# Patient Record
Sex: MALE | Race: AMERICAN INDIAN OR ALASKA NATIVE
[De-identification: names, ages, dates, MRNs, and addresses within clinical notes are randomized per-mention and may not be internally consistent; named-entity substitution may affect disease eponyms.]

---

## 2021-12-30 ENCOUNTER — HOSPITAL ENCOUNTER (INPATIENT)
Dept: HOSPITAL 5 - ED | Age: 76
LOS: 16 days | Discharge: HOME HEALTH | DRG: 299 | End: 2022-01-15
Attending: INTERNAL MEDICINE | Admitting: HOSPITALIST
Payer: COMMERCIAL

## 2021-12-30 DIAGNOSIS — I50.9: ICD-10-CM

## 2021-12-30 DIAGNOSIS — J69.0: ICD-10-CM

## 2021-12-30 DIAGNOSIS — J96.01: ICD-10-CM

## 2021-12-30 DIAGNOSIS — Z86.73: ICD-10-CM

## 2021-12-30 DIAGNOSIS — I11.0: ICD-10-CM

## 2021-12-30 DIAGNOSIS — E83.42: ICD-10-CM

## 2021-12-30 DIAGNOSIS — E87.6: ICD-10-CM

## 2021-12-30 DIAGNOSIS — N04.9: ICD-10-CM

## 2021-12-30 DIAGNOSIS — E87.1: ICD-10-CM

## 2021-12-30 DIAGNOSIS — A41.9: ICD-10-CM

## 2021-12-30 DIAGNOSIS — N17.9: ICD-10-CM

## 2021-12-30 DIAGNOSIS — I82.432: Primary | ICD-10-CM

## 2021-12-30 DIAGNOSIS — E87.0: ICD-10-CM

## 2021-12-30 DIAGNOSIS — Z99.3: ICD-10-CM

## 2021-12-30 DIAGNOSIS — I47.2: ICD-10-CM

## 2021-12-30 PROCEDURE — 71045 X-RAY EXAM CHEST 1 VIEW: CPT

## 2021-12-30 PROCEDURE — 81001 URINALYSIS AUTO W/SCOPE: CPT

## 2021-12-30 PROCEDURE — 93306 TTE W/DOPPLER COMPLETE: CPT

## 2021-12-30 PROCEDURE — 36415 COLL VENOUS BLD VENIPUNCTURE: CPT

## 2021-12-30 PROCEDURE — 82565 ASSAY OF CREATININE: CPT

## 2021-12-30 PROCEDURE — 72170 X-RAY EXAM OF PELVIS: CPT

## 2021-12-30 PROCEDURE — P9047 ALBUMIN (HUMAN), 25%, 50ML: HCPCS

## 2021-12-30 PROCEDURE — 83880 ASSAY OF NATRIURETIC PEPTIDE: CPT

## 2021-12-30 PROCEDURE — 85610 PROTHROMBIN TIME: CPT

## 2021-12-30 PROCEDURE — 85014 HEMATOCRIT: CPT

## 2021-12-30 PROCEDURE — 85025 COMPLETE CBC W/AUTO DIFF WBC: CPT

## 2021-12-30 PROCEDURE — 94760 N-INVAS EAR/PLS OXIMETRY 1: CPT

## 2021-12-30 PROCEDURE — 94640 AIRWAY INHALATION TREATMENT: CPT

## 2021-12-30 PROCEDURE — 82088 ASSAY OF ALDOSTERONE: CPT

## 2021-12-30 PROCEDURE — 71275 CT ANGIOGRAPHY CHEST: CPT

## 2021-12-30 PROCEDURE — 87086 URINE CULTURE/COLONY COUNT: CPT

## 2021-12-30 PROCEDURE — 82803 BLOOD GASES ANY COMBINATION: CPT

## 2021-12-30 PROCEDURE — 85018 HEMOGLOBIN: CPT

## 2021-12-30 PROCEDURE — 82533 TOTAL CORTISOL: CPT

## 2021-12-30 PROCEDURE — 85730 THROMBOPLASTIN TIME PARTIAL: CPT

## 2021-12-30 PROCEDURE — 80053 COMPREHEN METABOLIC PANEL: CPT

## 2021-12-30 PROCEDURE — 80048 BASIC METABOLIC PNL TOTAL CA: CPT

## 2021-12-30 PROCEDURE — 82962 GLUCOSE BLOOD TEST: CPT

## 2021-12-30 PROCEDURE — 85520 HEPARIN ASSAY: CPT

## 2021-12-30 PROCEDURE — 85007 BL SMEAR W/DIFF WBC COUNT: CPT

## 2021-12-30 PROCEDURE — 93970 EXTREMITY STUDY: CPT

## 2021-12-30 PROCEDURE — 83735 ASSAY OF MAGNESIUM: CPT

## 2021-12-30 PROCEDURE — 85027 COMPLETE CBC AUTOMATED: CPT

## 2021-12-30 PROCEDURE — 84443 ASSAY THYROID STIM HORMONE: CPT

## 2021-12-30 PROCEDURE — 85049 AUTOMATED PLATELET COUNT: CPT

## 2021-12-31 LAB
ALBUMIN SERPL-MCNC: 2.5 G/DL (ref 3.9–5)
ALT SERPL-CCNC: 17 UNITS/L (ref 7–56)
BASOPHILS # (AUTO): 0 K/MM3 (ref 0–0.1)
BASOPHILS NFR BLD AUTO: 0.1 % (ref 0–1.8)
BILIRUB UR QL STRIP: (no result)
BLOOD UR QL VISUAL: (no result)
BUN SERPL-MCNC: 15 MG/DL (ref 9–20)
BUN/CREAT SERPL: 12 %
CALCIUM SERPL-MCNC: 8.1 MG/DL (ref 8.4–10.2)
EOSINOPHIL # BLD AUTO: 0 K/MM3 (ref 0–0.4)
EOSINOPHIL NFR BLD AUTO: 0 % (ref 0–4.3)
HCT VFR BLD CALC: 35.3 % (ref 35.5–45.6)
HEMOLYSIS INDEX: 5
HGB BLD-MCNC: 11.4 GM/DL (ref 11.8–15.2)
HYALINE CASTS #/AREA URNS LPF: 8 /LPF
LYMPHOCYTES # BLD AUTO: 1.2 K/MM3 (ref 1.2–5.4)
LYMPHOCYTES NFR BLD AUTO: 11.4 % (ref 13.4–35)
MCHC RBC AUTO-ENTMCNC: 32 % (ref 32–34)
MCV RBC AUTO: 84 FL (ref 84–94)
MONOCYTES # (AUTO): 1.4 K/MM3 (ref 0–0.8)
MONOCYTES % (AUTO): 13.3 % (ref 0–7.3)
MUCOUS THREADS #/AREA URNS HPF: (no result) /HPF
PH UR STRIP: 6 [PH] (ref 5–7)
PLATELET # BLD: 264 K/MM3 (ref 140–440)
RBC # BLD AUTO: 4.19 M/MM3 (ref 3.65–5.03)
RBC #/AREA URNS HPF: 3 /HPF (ref 0–6)
UROBILINOGEN UR-MCNC: < 2 MG/DL (ref ?–2)
WBC #/AREA URNS HPF: 9 /HPF (ref 0–6)

## 2021-12-31 RX ADMIN — HEPARIN SODIUM SCH UNIT: 5000 INJECTION, SOLUTION INTRAVENOUS; SUBCUTANEOUS at 22:33

## 2021-12-31 RX ADMIN — POTASSIUM CHLORIDE SCH MLS/HR: 10 INJECTION, SOLUTION INTRAVENOUS at 19:52

## 2021-12-31 RX ADMIN — Medication SCH ML: at 10:28

## 2021-12-31 RX ADMIN — IPRATROPIUM BROMIDE AND ALBUTEROL SULFATE SCH AMPUL: .5; 3 SOLUTION RESPIRATORY (INHALATION) at 14:25

## 2021-12-31 RX ADMIN — IPRATROPIUM BROMIDE AND ALBUTEROL SULFATE SCH: .5; 3 SOLUTION RESPIRATORY (INHALATION) at 13:00

## 2021-12-31 RX ADMIN — POTASSIUM CHLORIDE SCH MLS/HR: 10 INJECTION, SOLUTION INTRAVENOUS at 23:45

## 2021-12-31 RX ADMIN — POTASSIUM CHLORIDE SCH MLS/HR: 10 INJECTION, SOLUTION INTRAVENOUS at 17:24

## 2021-12-31 RX ADMIN — ACETAMINOPHEN PRN MG: 325 TABLET ORAL at 16:09

## 2021-12-31 RX ADMIN — HEPARIN SODIUM SCH UNIT: 5000 INJECTION, SOLUTION INTRAVENOUS; SUBCUTANEOUS at 10:28

## 2021-12-31 RX ADMIN — FAMOTIDINE SCH: 10 TABLET ORAL at 10:28

## 2021-12-31 RX ADMIN — POTASSIUM CHLORIDE SCH MLS/HR: 10 INJECTION, SOLUTION INTRAVENOUS at 22:29

## 2021-12-31 RX ADMIN — IPRATROPIUM BROMIDE AND ALBUTEROL SULFATE SCH: .5; 3 SOLUTION RESPIRATORY (INHALATION) at 23:21

## 2021-12-31 RX ADMIN — FAMOTIDINE SCH MG: 10 TABLET ORAL at 22:34

## 2021-12-31 RX ADMIN — SPIRONOLACTONE SCH MG: 25 TABLET ORAL at 17:36

## 2021-12-31 RX ADMIN — Medication SCH ML: at 22:34

## 2021-12-31 RX ADMIN — POTASSIUM CHLORIDE SCH MLS/HR: 10 INJECTION, SOLUTION INTRAVENOUS at 18:50

## 2021-12-31 RX ADMIN — POTASSIUM CHLORIDE SCH MLS/HR: 10 INJECTION, SOLUTION INTRAVENOUS at 11:43

## 2021-12-31 RX ADMIN — POTASSIUM CHLORIDE SCH: 10 INJECTION, SOLUTION INTRAVENOUS at 19:51

## 2021-12-31 NOTE — XRAY REPORT
PELVIS 1 VIEW(S)



INDICATION / CLINICAL INFORMATION: right hip pain



COMPARISON: None available.

 

FINDINGS:



BONES / JOINT(S): No acute fracture or subluxation. Patient is status post right hip hemiarthroplasty
. There is heterotopic ossification noted in the femoral acetabular joint. No kumar hardware abnormal
ity.



SOFT TISSUES: No significant abnormality.



ADDITIONAL FINDINGS: None.







Signer Name: Román Palomino DO 

Signed: 12/31/2021 12:11 AM

Workstation Name: [id number]

## 2021-12-31 NOTE — CONSULTATION
History of Present Illness





- Reason for Consult


Consult date: 12/31/21


hyponatremia, hypokalemia





- History of Present Illness





76-year-old man with history of hypertension, CVA ,and right hip fracture 

presents with leg swelling.  Patient is a poor historian likely from CVA and not

a great deal was known about the patient's last several days and why he was 

brought to the emergency department.  Patient states that symptoms have been 

ongoing for some time.  Has never seen a nephrologist to his knowledge.  No 

report of any nausea, vomiting, diarrhea, fevers, chills, cough, or shortness of

breath.  





Does have edema, unclear duration however.  








Past History


Past Medical History: hypertension, stroke, other


Past Surgical History: Other (Right hip fracture)





Medications and Allergies


                                    Allergies











Allergy/AdvReac Type Severity Reaction Status Date / Time


 


No Known Allergies Allergy   Verified 12/30/21 22:39











Active Meds: 


Active Medications





Acetaminophen (Acetaminophen 325 Mg Tab)  650 mg PO Q4H PRN


   PRN Reason: Pain MILD(1-3)/Fever >100.5/HA


Albuterol (Albuterol 2.5 Mg/3 Ml Nebu)  2.5 mg IH Q4HRT PRN


   PRN Reason: Shortness Of Breath


Albuterol/Ipratropium (Ipratropium/Albuterol Sulfate 3 Ml Ampul.Neb)  1 ampul IH

Q6HRT SHAY


Famotidine (Famotidine 10 Mg Tab)  10 mg PO BID SHAY


Furosemide (Furosemide 20 Mg/2 Ml Inj)  20 mg IV QDAY SHAY


Heparin Sodium (Porcine) (Heparin 5,000 Unit/1 Ml Vial)  5,000 unit SUB-Q Q12HR 

SHAY


Hydromorphone HCl (Hydromorphone 1 Mg/1 Ml Inj)  0.5 mg IV Q3H PRN


   PRN Reason: Pain , Severe (7-10)


Morphine Sulfate (Morphine 2 Mg/1 Ml Inj)  2 mg IV Q4H PRN


   PRN Reason: Pain, Moderate (4-6)


Ondansetron HCl (Ondansetron 4 Mg/2 Ml Inj)  4 mg IV Q8H PRN


   PRN Reason: Nausea And Vomiting


Sodium Chloride (Sodium Chloride 0.9% 10 Ml Flush Syringe)  10 ml IV BID SHAY


Sodium Chloride (Sodium Chloride 0.9% 10 Ml Flush Syringe)  10 ml IV PRN PRN


   PRN Reason: LINE FLUSH











Review of Systems


All systems: negative (as per HPI)





Exam





- Vital Signs


Vital signs: 


                                   Vital Signs











Temp Pulse Resp BP Pulse Ox


 


 97.8 F   88   20   100/56   96 


 


 12/30/21 22:39  12/30/21 22:39  12/30/21 22:39  12/30/21 22:39  12/30/21 22:39














- Physical Exam


Narrative exam: 





Constitutional: no acute distress


Head: NC/AT


Neck: supple


Lungs: clear to auscultation


CV: RRR, no M/R/G


Abdomen: soft, non-tender, bowel sounds present


Back: nontender


Extremities: 2+ edema, pulses WNL


Skin: intact


Neuro: no focal deficits, alert and oriented x2





Results





- Lab Results





                                 12/30/21 23:51





                                 12/30/21 23:51


                             Most recent lab results











Calcium  8.1 mg/dL (8.4-10.2)  L  12/30/21  23:51    














Assessment and Plan





# Hypokalemia, Hyponatremia: unclear etiology of hypokalemia, but K 2.0 this AM.

 Aggressively replete, will help hyponatremia as well per Adrogue equation.  

Possible to have renal wasting in Fanconi like syndrome given proteinuria, but 

appears that patient may also be having diarrhea and GI loss on repeated 

questioning.


- replete KCl IV 


- no Lasix, start spironolactone for K-sparing property


- if edema not improving with spironolactone, recommend albumin + lasix prn only

to limit K loss


- if BP is stable, consider ACE/ARB which will help K loss as well, BP soft 

currently





# Anasarca, Proteinuria: potential nephrotic range proteinuria, check UP/C to 

quantify.  Cardiology evaluation pending.  Renal function WNL.





# CVA





# HTN: BP soft on admission, monitor

## 2021-12-31 NOTE — XRAY REPORT
CHEST 1 VIEW 12/30/2021 11:56 PM



INDICATION / CLINICAL INFORMATION: cough.



COMPARISON: 1/26/2021



FINDINGS:



SUPPORT DEVICES: None.

HEART / MEDIASTINUM: Stable. 

LUNGS / PLEURA: There is perivascular indistinctness with perihilar opacifications. No pneumothorax. 




ADDITIONAL FINDINGS: No significant additional findings.



IMPRESSION:

1. Mild pulmonary edema.

2. Right aortic arch.



Signer Name: Román Palomino DO 

Signed: 12/31/2021 12:14 AM

Workstation Name: [id number]

## 2021-12-31 NOTE — HISTORY AND PHYSICAL REPORT
History of Present Illness


Date of examination: 12/31/21


Date of admission: 


12/31/21


Chief complaint: 





Leg swelling


Sores on the bottom


History of present illness: 





76-year-old  male with history of hypertension CVA and right hip fracture who is

presenting with leg swelling.  Patient is a poor historian and not a great deal 

was known about the patient's last several days and why he was brought to the 

emergency department.  Paramedics state that someone called for the patient to 

come because he had some sores on his bottom and his legs were more swollen than

normal.  Patient several months ago had a right hip fracture repair and had been

in rehab for quite some time.  He has been at home at least for the last month. 

No report of any nausea vomiting diarrhea fevers chills cough cold congestion or

shortness of breath.  Patient is a poor historian secondary to his history of 

CVA.








In the emergency room patient is found to have potassium of 2.2, sodium 127, 

chloride 86.1, BUN 15 creatinine 1.3, proBNP 1300





Appears though the patient is third spacing.  Does have some mild pulmonary 

edema and lower extremity edema present however the patient clinically when 

reviewing his labs showing signs of being dehydrated.  His sodium and chloride 

levels are both low.  Initially once the sodium was seen start the patient on 

some IV fluids.  Blood pressure is 100 systolic.  Once the chest x-ray resulted 

and the urinalysis was finally done see that the patient does have some protein 

in his urine and likely is patient with nephrotic syndrome causing his third spa

cing.





Past History


Past Medical History: hypertension, stroke, other


Past Surgical History: Other (Right hip fracture)





Medications and Allergies


                                    Allergies











Allergy/AdvReac Type Severity Reaction Status Date / Time


 


No Known Allergies Allergy   Verified 12/30/21 22:39











Active Meds: 


Active Medications





Acetaminophen (Acetaminophen 325 Mg Tab)  650 mg PO Q4H PRN


   PRN Reason: Pain MILD(1-3)/Fever >100.5/HA


Albuterol (Albuterol 2.5 Mg/3 Ml Nebu)  2.5 mg IH Q4HRT PRN


   PRN Reason: Shortness Of Breath


Albuterol/Ipratropium (Ipratropium/Albuterol Sulfate 3 Ml Ampul.Neb)  1 ampul IH

Q6HRT SHAY


Famotidine (Famotidine 20 Mg Tab)  20 mg PO BID SHAY


Furosemide (Furosemide 20 Mg/2 Ml Inj)  20 mg IV QDAY [facility]


Heparin Sodium (Porcine) (Heparin 5,000 Unit/1 Ml Vial)  5,000 unit SUB-Q Q12HR 

SHAY


Hydromorphone HCl (Hydromorphone 1 Mg/1 Ml Inj)  0.5 mg IV Q3H PRN


   PRN Reason: Pain , Severe (7-10)


Morphine Sulfate (Morphine 2 Mg/1 Ml Inj)  2 mg IV Q4H PRN


   PRN Reason: Pain, Moderate (4-6)


Ondansetron HCl (Ondansetron 4 Mg/2 Ml Inj)  4 mg IV Q8H PRN


   PRN Reason: Nausea And Vomiting


Sodium Chloride (Sodium Chloride 0.9% 10 Ml Flush Syringe)  10 ml IV BID SHAY


Sodium Chloride (Sodium Chloride 0.9% 10 Ml Flush Syringe)  10 ml IV PRN PRN


   PRN Reason: LINE FLUSH











Review of Systems


All systems: negative


Constitutional: other (Sores on his bottom, leg swelling)





Exam





- Constitutional


Vitals: 


                                        











Temp Pulse Resp BP Pulse Ox


 


 97.8 F   88   20   100/56   96 


 


 12/30/21 22:39  12/30/21 22:39  12/30/21 22:39  12/30/21 22:39  12/30/21 22:39











General appearance: Present: no acute distress, well-nourished





- EENT


Eyes: Present: PERRL


ENT: hearing intact, clear oral mucosa





- Neck


Neck: Present: supple, normal ROM





- Respiratory


Respiratory effort: normal


Respiratory: bilateral: diminished





- Cardiovascular


Heart Sounds: Present: S1 & S2.  Absent: rub, click





- Extremities


Extremities: pulses symmetrical


Extremity abnormal: edema


Peripheral Pulses: within normal limits





- Abdominal


General gastrointestinal: Present: soft, non-tender, non-distended, normal bowel

sounds


Male genitourinary: Present: normal





- Integumentary


Integumentary: Present: clear, warm, dry





- Musculoskeletal


Musculoskeletal: gait normal, strength equal bilaterally





- Psychiatric


Psychiatric: appropriate mood/affect, intact judgment & insight





- Neurologic


Neurologic: CNII-XII intact, moves all extremities





Results





- Labs


CBC & Chem 7: 


                                 12/30/21 23:51





                                 12/30/21 23:51


Labs: 


                             Laboratory Last Values











WBC  10.6 K/mm3 (4.5-11.0)   12/30/21  23:51    


 


RBC  4.19 M/mm3 (3.65-5.03)   12/30/21  23:51    


 


Hgb  11.4 gm/dl (11.8-15.2)  L  12/30/21  23:51    


 


Hct  35.3 % (35.5-45.6)  L  12/30/21  23:51    


 


MCV  84 fl (84-94)   12/30/21  23:51    


 


MCH  27 pg (28-32)  L  12/30/21  23:51    


 


MCHC  32 % (32-34)   12/30/21  23:51    


 


RDW  19.9 % (13.2-15.2)  H  12/30/21  23:51    


 


Plt Count  264 K/mm3 (140-440)   12/30/21  23:51    


 


Lymph % (Auto)  11.4 % (13.4-35.0)  L  12/30/21  23:51    


 


Mono % (Auto)  13.3 % (0.0-7.3)  H  12/30/21  23:51    


 


Eos % (Auto)  0.0 % (0.0-4.3)   12/30/21  23:51    


 


Baso % (Auto)  0.1 % (0.0-1.8)   12/30/21  23:51    


 


Lymph # (Auto)  1.2 K/mm3 (1.2-5.4)   12/30/21  23:51    


 


Mono # (Auto)  1.4 K/mm3 (0.0-0.8)  H  12/30/21  23:51    


 


Eos # (Auto)  0.0 K/mm3 (0.0-0.4)   12/30/21  23:51    


 


Baso # (Auto)  0.0 K/mm3 (0.0-0.1)   12/30/21  23:51    


 


Seg Neutrophils %  75.2 % (40.0-70.0)  H  12/30/21  23:51    


 


Seg Neutrophils #  8.0 K/mm3 (1.8-7.7)  H  12/30/21  23:51    


 


Sodium  127 mmol/L (137-145)  L  12/30/21  23:51    


 


Potassium  2.0 mmol/L (3.6-5.0)  L*  12/30/21  23:51    


 


Chloride  86.1 mmol/L ()  L  12/30/21  23:51    


 


Carbon Dioxide  25 mmol/L (22-30)   12/30/21  23:51    


 


Anion Gap  18 mmol/L  12/30/21  23:51    


 


BUN  15 mg/dL (9-20)   12/30/21  23:51    


 


Creatinine  1.3 mg/dL (0.8-1.3)   12/30/21  23:51    


 


Estimated GFR  > 60 ml/min  12/30/21  23:51    


 


BUN/Creatinine Ratio  12 %  12/30/21  23:51    


 


Glucose  124 mg/dL ()  H  12/30/21  23:51    


 


Calcium  8.1 mg/dL (8.4-10.2)  L  12/30/21  23:51    


 


Total Bilirubin  1.40 mg/dL (0.1-1.2)  H  12/30/21  23:51    


 


AST  28 units/L (5-40)   12/30/21  23:51    


 


ALT  17 units/L (7-56)   12/30/21  23:51    


 


Alkaline Phosphatase  141 units/L ()  H  12/30/21  23:51    


 


NT-Pro-B Natriuret Pep  1300 pg/mL (0-900)  H  12/30/21  23:51    


 


Total Protein  5.9 g/dL (6.3-8.2)  L  12/30/21  23:51    


 


Albumin  2.5 g/dL (3.9-5)  L  12/30/21  23:51    


 


Albumin/Globulin Ratio  0.7 %  12/30/21  23:51    


 


Urine Color  Maren  (Yellow)   12/31/21  01:00    


 


Urine Turbidity  Slightly-cloudy  (Clear)   12/31/21  01:00    


 


Urine pH  6.0  (5.0-7.0)   12/31/21  01:00    


 


Ur Specific Gravity  1.012  (1.003-1.030)   12/31/21  01:00    


 


Urine Protein  30 mg/dl mg/dL (Negative)   12/31/21  01:00    


 


Urine Glucose (UA)  Neg mg/dL (Negative)   12/31/21  01:00    


 


Urine Ketones  Neg mg/dL (Negative)   12/31/21  01:00    


 


Urine Blood  Neg  (Negative)   12/31/21  01:00    


 


Urine Nitrite  Neg  (Negative)   12/31/21  01:00    


 


Urine Bilirubin  Neg  (Negative)   12/31/21  01:00    


 


Urine Urobilinogen  < 2.0 mg/dL (<2.0)   12/31/21  01:00    


 


Ur Leukocyte Esterase  Neg  (Negative)   12/31/21  01:00    


 


Urine WBC (Auto)  9.0 /HPF (0.0-6.0)  H  12/31/21  01:00    


 


Urine RBC (Auto)  3.0 /HPF (0.0-6.0)   12/31/21  01:00    


 


U Epithel Cells (Auto)  < 1.0 /HPF (0-13.0)   12/31/21  01:00    


 


Hyaline Casts  8 /LPF  12/31/21  01:00    


 


Urine Mucus  Few /HPF  12/31/21  01:00    














- Imaging and Cardiology


Chest x-ray: report reviewed





Assessment and Plan


VTE prophylaxis?: Chemical


Plan of care discussed with patient/family: Yes





- Patient Problems


(1) Anasarca


Current Visit: Yes   Status: Acute   


Plan to address problem: 


Admit the patient to the medical floor.  Cardiac diet.  Oxygen per nasal cannula

3 to per minute.  DuoNeb by nebulizer every 4 hours.  Lasix 40 mg IV daily.  

Echocardiogram.  Cardiology and nephrology evaluation








(2) Hypokalemia


Current Visit: Yes   Status: Acute   


Plan to address problem: 


Potassium is supplemented.  KCl 40 mEq p.o. every 4 hours x2 dose.  Recheck BMP 

in the morning








(3) CVA (cerebral vascular accident)


Current Visit: Yes   Status: Acute   


Plan to address problem: 


Stable.  Continue home medication.  Outpatient follow-up with neurology








(4) Hypertension


Current Visit: Yes   Status: Acute   


Plan to address problem: 


Hydralazine 10 mg IV every 6 hours as needed.  We will continue the home 

medication.  We will monitor the patient closely








(5) Hyponatremia


Current Visit: Yes   Status: Acute   


Plan to address problem: 


We will monitor the sodium closely.  Reconsult nephrology for further 

evaluation.  Recheck BMP in the morning








(6) Nephrotic syndrome


Current Visit: Yes   Status: Acute   


Plan to address problem: 


Clinical picture could be secondary to nephrotic syndrome.  Will consult 

nephrology for evaluation.  Recheck BMP in the morning








(7) DVT prophylaxis


Current Visit: Yes   Status: Acute   


Plan to address problem: 


Heparin 5000 units subcu every 12 hours for DVT prophylaxis.  Pepcid 20 mg p.o. 

twice daily for GI prophylaxis.  Patient is a full code

## 2021-12-31 NOTE — CONSULTATION
DATE OF CONSULTATION: 12/31/2021



HISTORY OF PRESENT ILLNESS:  The patient is a 76-year-old male with multiple 

medical problems including history of a stroke and he is unable to give any 

detailed history.  He was brought in with a chief complaint of ankle edema.  

There is no family member to assist in the history.  He was found to have 

markedly abnormal electrolytes.  He apparently has had a stroke in the past and 

a right hip fracture in the recent past.  He is nonambulatory.  As far as we 

know, there is no history of congestive heart failure, liver disease, kidney 

disease or thromboembolic disease.  There has been no chest pain or coronary 

artery disease as far as we can tell.  There have been no arrhythmias, 

hyperlipidemia, or diabetes.



PAST MEDICAL HISTORY AND ALLERGIES:  None.



MEDICATIONS:  See the nurse's list.



FAMILY HISTORY:  Unobtainable.



SOCIAL HISTORY:  Smoking, unknown.  Alcohol use, unknown.



PAST SURGICAL HISTORY:  Unknown.



REVIEW OF SYSTEMS:  No other complaints or medical problems obtainable.



PHYSICAL EXAMINATION:

GENERAL:  A well-developed, well-nourished, no acute distress.  Alert, but 

confused.

EYES, NOSE, AND THROAT:  Unremarkable.

NECK:  Reveals no JVD or bruits.  Supple, no masses.

LUNGS:  Diminished breath sounds, no rales or rhonchi.  No labored respirations.

HEART:  Regular rhythm.  No rubs, murmurs or gallops appreciable.  Heart sounds 

somewhat distant.

ABDOMEN:  Soft, nontender, no masses.  Bowel sounds intact.

EXTREMITIES:  No cyanosis or clubbing.  There is 2+ lower extremity edema.  

Pulses are intact.  No varicose veins.  Deformity of the right leg.  No obvious 

skin lesions.  There is evidence of vitiligo on his body.



IMPRESSION:

1.  Lower extremity edema:  Etiology is not clear, but there may be an element 

of congestive heart failure given chest x-ray findings and elevated BNP, but the

patient does not show any labored respirations.  It is possible that he has 

right congestive heart failure due to lung disease since the chest x-ray is 

abnormal.  It is unclear if the chest x-ray is demonstrating acute pulmonary 

edema or chronic changes.  Other considerations would be that the edema is due 

to underlying liver disease since the LFTs are significantly abnormal.  

Proteinuria was noted, so nephrotic syndrome would be another possibility.  

Severe venous insufficiency would also be a possibility.  So the workup is in 

progress and echocardiography and renal workup are in progress.

2.  Marked electrolyte abnormalities with hyponatremia and hypokalemia:  

Etiology is in progress and renal workup is in progress.

3.  Elevated LFTs:  Consider underlying cirrhosis or fatty liver disease.

4.  History of cerebrovascular accident.

5.  Hypertension.

6.  Debilitated state with deconditioning following hip surgery.

7.  Vitiligo.



Thank you for this consultation.  We will follow the patient.  We will try to 

obtain previous records as well.







DD: 12/31/2021 10:42 AM

DT: 12/31/2021 12:28 PM

TID: [id number] RECEIPT: [id number]

KATHY/VINNY

## 2021-12-31 NOTE — EMERGENCY DEPARTMENT REPORT
ED General Adult HPI





- General


Chief complaint: Extremity Injury, Lower


Stated complaint: R decubitus


Time Seen by Provider: 12/30/21 23:31


Source: EMS


Mode of arrival: Stretcher


Limitations: No Limitations





- History of Present Illness


Initial comments: 





Patient is a 76-year-old F American male with a past medical history of 

hypertension CVA and right hip fracture who is presenting with leg swelling.  

Patient is a poor historian and not a great deal was known about the patient's 

last several days and why he was brought to the emergency department.  [facility] state that someone called for the patient to come because he had some 

sores on his bottom and his legs were more swollen than normal.  Patient several

months ago had a right hip fracture repair and had been in rehab for quite some 

time.  He has been at home at least for the last month.  No report of any nausea

vomiting diarrhea fevers chills cough cold congestion or shortness of breath.  

Patient is a poor historian secondary to his history of CVA.


Severity scale (0 -10): 0





- Related Data


                                    Allergies











Allergy/AdvReac Type Severity Reaction Status Date / Time


 


No Known Allergies Allergy   Verified 12/30/21 22:39














ED Review of Systems


ROS: 


Stated complaint: R decubitus


Other details as noted in HPI





Comment: Unobtainable due to pts medical conditions





ED Past Medical Hx





- Past Medical History


Hx Hypertension: Yes


Hx CVA: Yes





- Social History


Smoking Status: Unknown if ever smoked





ED Physical Exam





- General


Limitations: No Limitations, Altered Mental Status


General appearance: alert, in no apparent distress





- Head


Head exam: Present: atraumatic, normocephalic





- Eye


Eye exam: Present: normal appearance





- ENT


ENT exam: Present: mucous membranes moist





- Neck


Neck exam: Present: normal inspection





- Respiratory


Respiratory exam: Present: rales, rhonchi.  Absent: normal lung sounds 

bilaterally, respiratory distress, wheezes, stridor





- Cardiovascular


Cardiovascular Exam: Present: regular rate, normal rhythm, normal heart sounds. 

Absent: systolic murmur, diastolic murmur, rubs, gallop





- GI/Abdominal


GI/Abdominal exam: Present: soft, normal bowel sounds.  Absent: distended, 

tenderness, guarding





- Rectal


Rectal exam: Present: deferred





- Extremities Exam


Extremities exam: Present: normal inspection





- Back Exam


Back exam: Present: normal inspection





- Neurological Exam


Neurological exam: Present: alert, oriented X3





- Psychiatric


Psychiatric exam: Present: normal affect, normal mood





- Skin


Skin exam: Present: warm, dry, intact, normal color, other (Stage I or II 

decubitus in the right buttock.  Bilateral lower extremity edema 3+.  Appears to

be some small skin tears near the right ankle.).  Absent: rash





ED Course





                                   Vital Signs











  12/30/21





  22:39


 


Temperature 97.8 F


 


Pulse Rate 88


 


Respiratory 20





Rate 


 


Blood Pressure 100/56





[Right] 


 


O2 Sat by Pulse 96





Oximetry 














ED Medical Decision Making





- Lab Data


Result diagrams: 


                                 12/30/21 23:51





                                 12/30/21 23:51








                                   Lab Results











  12/30/21 12/30/21 12/31/21 Range/Units





  23:51 23:51 01:00 


 


WBC  10.6    (4.5-11.0)  K/mm3


 


RBC  4.19    (3.65-5.03)  M/mm3


 


Hgb  11.4 L    (11.8-15.2)  gm/dl


 


Hct  35.3 L    (35.5-45.6)  %


 


MCV  84    (84-94)  fl


 


MCH  27 L    (28-32)  pg


 


MCHC  32    (32-34)  %


 


RDW  19.9 H    (13.2-15.2)  %


 


Plt Count  264    (140-440)  K/mm3


 


Lymph % (Auto)  11.4 L    (13.4-35.0)  %


 


Mono % (Auto)  13.3 H    (0.0-7.3)  %


 


Eos % (Auto)  0.0    (0.0-4.3)  %


 


Baso % (Auto)  0.1    (0.0-1.8)  %


 


Lymph # (Auto)  1.2    (1.2-5.4)  K/mm3


 


Mono # (Auto)  1.4 H    (0.0-0.8)  K/mm3


 


Eos # (Auto)  0.0    (0.0-0.4)  K/mm3


 


Baso # (Auto)  0.0    (0.0-0.1)  K/mm3


 


Seg Neutrophils %  75.2 H    (40.0-70.0)  %


 


Seg Neutrophils #  8.0 H    (1.8-7.7)  K/mm3


 


Sodium   127 L   (137-145)  mmol/L


 


Potassium   2.0 L*   (3.6-5.0)  mmol/L


 


Chloride   86.1 L   ()  mmol/L


 


Carbon Dioxide   25   (22-30)  mmol/L


 


Anion Gap   18   mmol/L


 


BUN   15   (9-20)  mg/dL


 


Creatinine   1.3   (0.8-1.3)  mg/dL


 


Estimated GFR   > 60   ml/min


 


BUN/Creatinine Ratio   12   %


 


Glucose   124 H   ()  mg/dL


 


Calcium   8.1 L   (8.4-10.2)  mg/dL


 


Total Bilirubin   1.40 H   (0.1-1.2)  mg/dL


 


AST   28   (5-40)  units/L


 


ALT   17   (7-56)  units/L


 


Alkaline Phosphatase   141 H   ()  units/L


 


NT-Pro-B Natriuret Pep   1300 H   (0-900)  pg/mL


 


Total Protein   5.9 L   (6.3-8.2)  g/dL


 


Albumin   2.5 L   (3.9-5)  g/dL


 


Albumin/Globulin Ratio   0.7   %


 


Urine Color    Maren  (Yellow)  


 


Urine Turbidity    Slightly-cloudy  (Clear)  


 


Urine pH    6.0  (5.0-7.0)  


 


Ur Specific Gravity    1.012  (1.003-1.030)  


 


Urine Protein    30 mg/dl  (Negative)  mg/dL


 


Urine Glucose (UA)    Neg  (Negative)  mg/dL


 


Urine Ketones    Neg  (Negative)  mg/dL


 


Urine Blood    Neg  (Negative)  


 


Urine Nitrite    Neg  (Negative)  


 


Urine Bilirubin    Neg  (Negative)  


 


Urine Urobilinogen    < 2.0  (<2.0)  mg/dL


 


Ur Leukocyte Esterase    Neg  (Negative)  


 


Urine WBC (Auto)    9.0 H  (0.0-6.0)  /HPF


 


Urine RBC (Auto)    3.0  (0.0-6.0)  /HPF


 


U Epithel Cells (Auto)    < 1.0  (0-13.0)  /HPF


 


Hyaline Casts    8  /LPF


 


Urine Mucus    Few  /HPF














- Radiology Data





CHEST 1 VIEW 12/30/2021 11:56 PM  


 


 INDICATION / CLINICAL INFORMATION: cough.  


 


 COMPARISON: 1/26/2021  


 


 FINDINGS:  


 


 SUPPORT DEVICES: None.  


 HEART / MEDIASTINUM: Stable.   


 LUNGS / PLEURA: There is perivascular indistinctness with perihilar 

opacifications. No 


pneumothorax.   


 


 ADDITIONAL FINDINGS: No significant additional findings.  


 


 IMPRESSION:  


 1. Mild pulmonary edema.  


 2. Right aortic arch.  


 


 Signer Name: Román Palomino DO   


 Signed: 12/31/2021 12:14 AM  


 Workstation Name: [id number]   


 


 


Transcribed By: NS  


Dictated By: ROMÁN PALOMINO DO  


Electronically Authenticated By: ROMÁN PALOMINO DO    


Signed Date/Time: 12/31/21 0014                                


 


PELVIS 1 VIEW(S)  


 


 INDICATION / CLINICAL INFORMATION: right hip pain  


 


 COMPARISON: None available.  


 


 FINDINGS:  


 


 BONES / JOINT(S): No acute fracture or subluxation. Patient is status post 

right hip 


hemiarthroplasty. There is heterotopic ossification noted in the femoral 

acetabular joint. No kumar 


hardware abnormality.  


 


 SOFT TISSUES: No significant abnormality.  


 


 ADDITIONAL FINDINGS: None.  


 


 


 


 Signer Name: Román Palomino DO   


 Signed: 12/31/2021 12:11 AM  


 Workstation Name: [id number]





- Medical Decision Making





Patient is a 76-year-old F American male who is a poor historian secondary to 

CVA who is presenting with swelling to the legs.  Appears though the patient is 

third spacing.  Does have some mild pulmonary edema and lower extremity edema 

present however the patient clinically when reviewing his labs showing signs of 

being dehydrated.  His sodium and chloride levels are both low.  Initially once 

the sodium was seen start the patient on some IV fluids.  Blood pressure is 100 

systolic.  Once the chest x-ray resulted and the urinalysis was finally done see

 that the patient does have some protein in his urine and likely is patient with

 nephrotic syndrome causing his third spacing.  Patient be admitted to the 

hospital service for further management.  Patient's potassium was 2 and 

potassium supplements have been ordered.


Critical care attestation.: 


If time is entered above; I have spent that time in minutes in the direct care 

of this critically ill patient, excluding procedure time.








ED Disposition


Clinical Impression: 


 Hypokalemia, Hyponatremia, Nephrotic syndrome, Anasarca





Disposition: 09 ADMITTED AS INPATIENT


Is pt being admited?: Yes


Does the pt Need Aspirin: No


Condition: Stable


Time of Disposition: 02:43

## 2022-01-01 LAB
BAND NEUTROPHILS # (MANUAL): 0 K/MM3
BUN SERPL-MCNC: 15 MG/DL (ref 9–20)
BUN/CREAT SERPL: 15 %
CALCIUM SERPL-MCNC: 7.9 MG/DL (ref 8.4–10.2)
HCT VFR BLD CALC: 35.4 % (ref 35.5–45.6)
HEMOLYSIS INDEX: 13
HGB BLD-MCNC: 11.2 GM/DL (ref 11.8–15.2)
MCHC RBC AUTO-ENTMCNC: 32 % (ref 32–34)
MCV RBC AUTO: 88 FL (ref 84–94)
MYELOCYTES # (MANUAL): 0 K/MM3
PLATELET # BLD: 231 K/MM3 (ref 140–440)
PROMYELOCYTES # (MANUAL): 0 K/MM3
RBC # BLD AUTO: 4.05 M/MM3 (ref 3.65–5.03)
TOTAL CELLS COUNTED BLD: 100

## 2022-01-01 RX ADMIN — HEPARIN SODIUM SCH UNIT: 5000 INJECTION, SOLUTION INTRAVENOUS; SUBCUTANEOUS at 10:01

## 2022-01-01 RX ADMIN — HEPARIN SODIUM SCH UNIT: 5000 INJECTION, SOLUTION INTRAVENOUS; SUBCUTANEOUS at 22:12

## 2022-01-01 RX ADMIN — Medication SCH ML: at 22:15

## 2022-01-01 RX ADMIN — IPRATROPIUM BROMIDE AND ALBUTEROL SULFATE SCH AMPUL: .5; 3 SOLUTION RESPIRATORY (INHALATION) at 18:43

## 2022-01-01 RX ADMIN — POTASSIUM CHLORIDE SCH MLS/HR: 10 INJECTION, SOLUTION INTRAVENOUS at 00:45

## 2022-01-01 RX ADMIN — IPRATROPIUM BROMIDE AND ALBUTEROL SULFATE SCH: .5; 3 SOLUTION RESPIRATORY (INHALATION) at 23:16

## 2022-01-01 RX ADMIN — FAMOTIDINE SCH MG: 10 TABLET ORAL at 10:02

## 2022-01-01 RX ADMIN — Medication SCH ML: at 10:02

## 2022-01-01 RX ADMIN — IPRATROPIUM BROMIDE AND ALBUTEROL SULFATE SCH: .5; 3 SOLUTION RESPIRATORY (INHALATION) at 09:59

## 2022-01-01 RX ADMIN — FAMOTIDINE SCH MG: 10 TABLET ORAL at 22:11

## 2022-01-01 RX ADMIN — IPRATROPIUM BROMIDE AND ALBUTEROL SULFATE SCH: .5; 3 SOLUTION RESPIRATORY (INHALATION) at 03:07

## 2022-01-01 RX ADMIN — POTASSIUM CHLORIDE SCH: 10 INJECTION, SOLUTION INTRAVENOUS at 02:01

## 2022-01-01 RX ADMIN — SPIRONOLACTONE SCH MG: 25 TABLET ORAL at 10:02

## 2022-01-01 RX ADMIN — POTASSIUM CHLORIDE SCH MLS/HR: 10 INJECTION, SOLUTION INTRAVENOUS at 02:00

## 2022-01-01 RX ADMIN — POTASSIUM CHLORIDE SCH MEQ: 1500 TABLET, EXTENDED RELEASE ORAL at 22:11

## 2022-01-02 RX ADMIN — IPRATROPIUM BROMIDE AND ALBUTEROL SULFATE SCH: .5; 3 SOLUTION RESPIRATORY (INHALATION) at 10:33

## 2022-01-02 RX ADMIN — HEPARIN SODIUM SCH UNIT: 5000 INJECTION, SOLUTION INTRAVENOUS; SUBCUTANEOUS at 13:09

## 2022-01-02 RX ADMIN — SPIRONOLACTONE SCH MG: 25 TABLET ORAL at 13:09

## 2022-01-02 RX ADMIN — Medication SCH ML: at 22:06

## 2022-01-02 RX ADMIN — HEPARIN SODIUM SCH UNIT: 5000 INJECTION, SOLUTION INTRAVENOUS; SUBCUTANEOUS at 22:05

## 2022-01-02 RX ADMIN — ACETAMINOPHEN PRN MG: 325 TABLET ORAL at 22:07

## 2022-01-02 RX ADMIN — Medication SCH ML: at 13:10

## 2022-01-02 RX ADMIN — IPRATROPIUM BROMIDE AND ALBUTEROL SULFATE SCH AMPUL: .5; 3 SOLUTION RESPIRATORY (INHALATION) at 10:33

## 2022-01-02 RX ADMIN — IPRATROPIUM BROMIDE AND ALBUTEROL SULFATE SCH AMPUL: .5; 3 SOLUTION RESPIRATORY (INHALATION) at 15:11

## 2022-01-02 RX ADMIN — POTASSIUM CHLORIDE SCH MEQ: 1500 TABLET, EXTENDED RELEASE ORAL at 13:09

## 2022-01-02 RX ADMIN — FAMOTIDINE SCH MG: 10 TABLET ORAL at 22:05

## 2022-01-02 RX ADMIN — FAMOTIDINE SCH MG: 10 TABLET ORAL at 13:10

## 2022-01-02 RX ADMIN — IPRATROPIUM BROMIDE AND ALBUTEROL SULFATE SCH AMPUL: .5; 3 SOLUTION RESPIRATORY (INHALATION) at 22:48

## 2022-01-03 LAB
APTT BLD: 67.8 SEC. (ref 24.2–36.6)
BUN SERPL-MCNC: 13 MG/DL (ref 9–20)
BUN/CREAT SERPL: 16 %
CALCIUM SERPL-MCNC: 8.1 MG/DL (ref 8.4–10.2)
HCT VFR BLD CALC: 28.8 % (ref 35.5–45.6)
HEMOLYSIS INDEX: 33
HGB BLD-MCNC: 9 GM/DL (ref 11.8–15.2)
INR PPP: 1.2 (ref 0.87–1.13)
PLATELET # BLD: 247 K/MM3 (ref 140–440)

## 2022-01-03 RX ADMIN — POTASSIUM CHLORIDE SCH MEQ: 1500 TABLET, EXTENDED RELEASE ORAL at 11:05

## 2022-01-03 RX ADMIN — MORPHINE SULFATE PRN MG: 2 INJECTION, SOLUTION INTRAMUSCULAR; INTRAVENOUS at 00:33

## 2022-01-03 RX ADMIN — IPRATROPIUM BROMIDE AND ALBUTEROL SULFATE SCH AMPUL: .5; 3 SOLUTION RESPIRATORY (INHALATION) at 08:29

## 2022-01-03 RX ADMIN — IPRATROPIUM BROMIDE AND ALBUTEROL SULFATE SCH AMPUL: .5; 3 SOLUTION RESPIRATORY (INHALATION) at 13:41

## 2022-01-03 RX ADMIN — Medication SCH ML: at 21:24

## 2022-01-03 RX ADMIN — Medication SCH ML: at 11:06

## 2022-01-03 RX ADMIN — SPIRONOLACTONE SCH MG: 25 TABLET ORAL at 11:05

## 2022-01-03 RX ADMIN — MORPHINE SULFATE PRN MG: 2 INJECTION, SOLUTION INTRAMUSCULAR; INTRAVENOUS at 04:51

## 2022-01-03 RX ADMIN — IPRATROPIUM BROMIDE AND ALBUTEROL SULFATE SCH: .5; 3 SOLUTION RESPIRATORY (INHALATION) at 08:29

## 2022-01-03 RX ADMIN — FAMOTIDINE SCH MG: 10 TABLET ORAL at 21:24

## 2022-01-03 RX ADMIN — HEPARIN SODIUM SCH UNIT: 5000 INJECTION, SOLUTION INTRAVENOUS; SUBCUTANEOUS at 11:05

## 2022-01-03 RX ADMIN — METOPROLOL TARTRATE SCH: 25 TABLET, FILM COATED ORAL at 21:21

## 2022-01-03 RX ADMIN — FAMOTIDINE SCH MG: 10 TABLET ORAL at 11:05

## 2022-01-03 RX ADMIN — IPRATROPIUM BROMIDE AND ALBUTEROL SULFATE SCH AMPUL: .5; 3 SOLUTION RESPIRATORY (INHALATION) at 21:01

## 2022-01-03 RX ADMIN — MORPHINE SULFATE PRN MG: 2 INJECTION, SOLUTION INTRAMUSCULAR; INTRAVENOUS at 18:25

## 2022-01-03 NOTE — VASCULAR LAB REPORT
DUPLEX DOPPLER LOWER EXTREMITY VEINS, BILATERAL



INDICATION / CLINICAL INFORMATION:

EDEMA.



TECHNIQUE:

Duplex doppler imaging was performed through the veins of both lower extremities using venous joey
varun and other maneuvers.



COMPARISON: 

None available.



FINDINGS:

RIGHT COMMON FEMORAL VEIN: Negative.

RIGHT FEMORAL VEIN: Negative.

RIGHT POPLITEAL VEIN: Negative.

RIGHT CALF VEINS: Negative.



LEFT COMMON FEMORAL VEIN: Negative.

LEFT FEMORAL VEIN: Negative.

LEFT POPLITEAL VEIN: Acute thrombus.

LEFT CALF VEINS: Acute thrombus.



ADDITIONAL FINDINGS: Left foreleg subcutaneous soft tissue swelling/edema



IMPRESSION:

1. Acute DVT left popliteal and calf veins.

2. No DVT right lower extremity



Signer Name: Aneudy Wells MD 

Signed: 1/3/2022 7:27 PM

Workstation Name: [id number]

## 2022-01-03 NOTE — EVENT NOTE
Date: 01/03/22





Paged by RN that ultrasound tech states that patient has lower extremity DVT in 

left popliteal vein. Will initiate anticoagulation with heparin gtt.

## 2022-01-04 LAB
APTT BLD: 135 SEC. (ref 24.2–36.6)
BUN SERPL-MCNC: 11 MG/DL (ref 9–20)
BUN/CREAT SERPL: 16 %
CALCIUM SERPL-MCNC: 7.9 MG/DL (ref 8.4–10.2)
HCT VFR BLD CALC: 28.8 % (ref 35.5–45.6)
HEMOLYSIS INDEX: 0
HGB BLD-MCNC: 9.5 GM/DL (ref 11.8–15.2)
INR PPP: 1.13 (ref 0.87–1.13)
MCHC RBC AUTO-ENTMCNC: 33 % (ref 32–34)
MCV RBC AUTO: 85 FL (ref 84–94)
PLATELET # BLD: 244 K/MM3 (ref 140–440)
RBC # BLD AUTO: 3.38 M/MM3 (ref 3.65–5.03)

## 2022-01-04 RX ADMIN — POTASSIUM CHLORIDE SCH MEQ: 1500 TABLET, EXTENDED RELEASE ORAL at 10:38

## 2022-01-04 RX ADMIN — FAMOTIDINE SCH MG: 10 TABLET ORAL at 22:57

## 2022-01-04 RX ADMIN — FAMOTIDINE SCH MG: 10 TABLET ORAL at 10:38

## 2022-01-04 RX ADMIN — METOPROLOL TARTRATE SCH MG: 25 TABLET, FILM COATED ORAL at 10:37

## 2022-01-04 RX ADMIN — SPIRONOLACTONE SCH MG: 25 TABLET ORAL at 10:37

## 2022-01-04 RX ADMIN — IPRATROPIUM BROMIDE AND ALBUTEROL SULFATE SCH AMPUL: .5; 3 SOLUTION RESPIRATORY (INHALATION) at 15:37

## 2022-01-04 RX ADMIN — IPRATROPIUM BROMIDE AND ALBUTEROL SULFATE SCH AMPUL: .5; 3 SOLUTION RESPIRATORY (INHALATION) at 23:25

## 2022-01-04 RX ADMIN — METOPROLOL TARTRATE SCH: 25 TABLET, FILM COATED ORAL at 22:55

## 2022-01-04 RX ADMIN — IPRATROPIUM BROMIDE AND ALBUTEROL SULFATE SCH AMPUL: .5; 3 SOLUTION RESPIRATORY (INHALATION) at 03:44

## 2022-01-04 RX ADMIN — HEPARIN SODIUM SCH MLS/HR: 5000 INJECTION, SOLUTION INTRAVENOUS at 14:32

## 2022-01-04 RX ADMIN — IPRATROPIUM BROMIDE AND ALBUTEROL SULFATE SCH AMPUL: .5; 3 SOLUTION RESPIRATORY (INHALATION) at 09:32

## 2022-01-05 LAB
APTT BLD: 33.1 SEC. (ref 24.2–36.6)
BUN SERPL-MCNC: 9 MG/DL (ref 9–20)
BUN/CREAT SERPL: 15 %
CALCIUM SERPL-MCNC: 8.5 MG/DL (ref 8.4–10.2)
HCT VFR BLD CALC: 29.3 % (ref 35.5–45.6)
HCT VFR BLD CALC: 34.7 % (ref 35.5–45.6)
HEMOLYSIS INDEX: 38
HGB BLD-MCNC: 11 GM/DL (ref 11.8–15.2)
HGB BLD-MCNC: 9.4 GM/DL (ref 11.8–15.2)
INR PPP: 1.02 (ref 0.87–1.13)
MCHC RBC AUTO-ENTMCNC: 32 % (ref 32–34)
MCV RBC AUTO: 87 FL (ref 84–94)
PLATELET # BLD: 261 K/MM3 (ref 140–440)
PLATELET # BLD: 262 K/MM3 (ref 140–440)
RBC # BLD AUTO: 3.99 M/MM3 (ref 3.65–5.03)

## 2022-01-05 RX ADMIN — FAMOTIDINE SCH MG: 10 TABLET ORAL at 21:40

## 2022-01-05 RX ADMIN — IPRATROPIUM BROMIDE AND ALBUTEROL SULFATE SCH AMPUL: .5; 3 SOLUTION RESPIRATORY (INHALATION) at 21:08

## 2022-01-05 RX ADMIN — APIXABAN SCH MG: 5 TABLET, FILM COATED ORAL at 21:39

## 2022-01-05 RX ADMIN — IPRATROPIUM BROMIDE AND ALBUTEROL SULFATE SCH AMPUL: .5; 3 SOLUTION RESPIRATORY (INHALATION) at 09:17

## 2022-01-05 RX ADMIN — IPRATROPIUM BROMIDE AND ALBUTEROL SULFATE SCH: .5; 3 SOLUTION RESPIRATORY (INHALATION) at 21:07

## 2022-01-05 RX ADMIN — SPIRONOLACTONE SCH MG: 25 TABLET ORAL at 10:08

## 2022-01-05 RX ADMIN — POTASSIUM CHLORIDE SCH MEQ: 1500 TABLET, EXTENDED RELEASE ORAL at 10:08

## 2022-01-05 RX ADMIN — METOPROLOL TARTRATE SCH MG: 25 TABLET, FILM COATED ORAL at 10:07

## 2022-01-05 RX ADMIN — Medication SCH ML: at 10:09

## 2022-01-05 RX ADMIN — HEPARIN SODIUM SCH MLS/HR: 5000 INJECTION, SOLUTION INTRAVENOUS at 06:31

## 2022-01-05 RX ADMIN — METOPROLOL TARTRATE SCH MG: 25 TABLET, FILM COATED ORAL at 21:40

## 2022-01-05 RX ADMIN — APIXABAN SCH MG: 5 TABLET, FILM COATED ORAL at 12:30

## 2022-01-05 RX ADMIN — FAMOTIDINE SCH MG: 10 TABLET ORAL at 10:08

## 2022-01-05 RX ADMIN — Medication SCH ML: at 21:41

## 2022-01-06 LAB
HCO3 BLDA-SCNC: 32.2 MMOL/L (ref 20–26)
HCT VFR BLD CALC: 34.6 % (ref 35.5–45.6)
HGB BLD-MCNC: 10.8 GM/DL (ref 11.8–15.2)
MCHC RBC AUTO-ENTMCNC: 31 % (ref 32–34)
MCV RBC AUTO: 89 FL (ref 84–94)
PCO2 BLDA: 49.5 MM HG
PH BLDA: 7.43 PH UNITS (ref 7.35–7.45)
PLATELET # BLD: 239 K/MM3 (ref 140–440)
PO2 BLDA: 25.2 MM HG (ref 80–90)
RBC # BLD AUTO: 3.9 M/MM3 (ref 3.65–5.03)

## 2022-01-06 RX ADMIN — IPRATROPIUM BROMIDE AND ALBUTEROL SULFATE SCH AMPUL: .5; 3 SOLUTION RESPIRATORY (INHALATION) at 13:09

## 2022-01-06 RX ADMIN — IPRATROPIUM BROMIDE AND ALBUTEROL SULFATE SCH AMPUL: .5; 3 SOLUTION RESPIRATORY (INHALATION) at 20:23

## 2022-01-06 RX ADMIN — METOPROLOL TARTRATE SCH MG: 25 TABLET, FILM COATED ORAL at 09:43

## 2022-01-06 RX ADMIN — METOPROLOL TARTRATE SCH MG: 25 TABLET, FILM COATED ORAL at 21:10

## 2022-01-06 RX ADMIN — Medication SCH ML: at 09:42

## 2022-01-06 RX ADMIN — IPRATROPIUM BROMIDE AND ALBUTEROL SULFATE SCH AMPUL: .5; 3 SOLUTION RESPIRATORY (INHALATION) at 08:24

## 2022-01-06 RX ADMIN — Medication SCH ML: at 09:43

## 2022-01-06 RX ADMIN — MORPHINE SULFATE PRN MG: 2 INJECTION, SOLUTION INTRAMUSCULAR; INTRAVENOUS at 09:43

## 2022-01-06 RX ADMIN — Medication SCH: at 09:43

## 2022-01-06 RX ADMIN — APIXABAN SCH MG: 5 TABLET, FILM COATED ORAL at 21:10

## 2022-01-06 RX ADMIN — SPIRONOLACTONE SCH MG: 25 TABLET ORAL at 09:42

## 2022-01-06 RX ADMIN — FAMOTIDINE SCH MG: 10 TABLET ORAL at 21:09

## 2022-01-06 RX ADMIN — Medication SCH ML: at 21:11

## 2022-01-06 RX ADMIN — FAMOTIDINE SCH MG: 10 TABLET ORAL at 09:43

## 2022-01-06 RX ADMIN — POTASSIUM CHLORIDE SCH MEQ: 1500 TABLET, EXTENDED RELEASE ORAL at 09:42

## 2022-01-06 RX ADMIN — APIXABAN SCH MG: 5 TABLET, FILM COATED ORAL at 09:42

## 2022-01-06 NOTE — XRAY REPORT
CHEST 1 VIEW 



INDICATION:  resp failure.



COMPARISON:  12/30/2021



FINDINGS:

Support devices: None. 



Heart: Heart size remains within normal limits. Right aortic arch is again suggested. Multiple surgic
al clips overlie the mediastinum, correlate with history. 

Lungs/Pleura: Prominent interstitial markings, right greater than left, are again seen and not signif
icantly changed. No evidence for acute infiltrate, large pleural effusion or pneumothorax. 



Additional findings: None.



IMPRESSION:

 No acute findings. Chronic findings as described which appear unchanged since 12/30/2021.



Signer Name: Fredi Echeverria Jr, MD 

Signed: 1/6/2022 8:27 AM

Workstation Name: [id number]

## 2022-01-07 LAB
HCT VFR BLD CALC: 34.7 % (ref 35.5–45.6)
HGB BLD-MCNC: 10.7 GM/DL (ref 11.8–15.2)
MCHC RBC AUTO-ENTMCNC: 31 % (ref 32–34)
MCV RBC AUTO: 88 FL (ref 84–94)
PLATELET # BLD: 214 K/MM3 (ref 140–440)
RBC # BLD AUTO: 3.94 M/MM3 (ref 3.65–5.03)

## 2022-01-07 PROCEDURE — 05H833Z INSERTION OF INFUSION DEVICE INTO LEFT AXILLARY VEIN, PERCUTANEOUS APPROACH: ICD-10-PCS | Performed by: HOSPITALIST

## 2022-01-07 RX ADMIN — SPIRONOLACTONE SCH MG: 25 TABLET ORAL at 09:20

## 2022-01-07 RX ADMIN — APIXABAN SCH MG: 5 TABLET, FILM COATED ORAL at 21:55

## 2022-01-07 RX ADMIN — FAMOTIDINE SCH MG: 10 TABLET ORAL at 09:20

## 2022-01-07 RX ADMIN — Medication SCH ML: at 09:20

## 2022-01-07 RX ADMIN — METOPROLOL TARTRATE SCH MG: 25 TABLET, FILM COATED ORAL at 09:20

## 2022-01-07 RX ADMIN — FAMOTIDINE SCH MG: 10 TABLET ORAL at 21:55

## 2022-01-07 RX ADMIN — Medication SCH ML: at 21:54

## 2022-01-07 RX ADMIN — IPRATROPIUM BROMIDE AND ALBUTEROL SULFATE SCH AMPUL: .5; 3 SOLUTION RESPIRATORY (INHALATION) at 21:35

## 2022-01-07 RX ADMIN — METOPROLOL TARTRATE SCH: 25 TABLET, FILM COATED ORAL at 21:53

## 2022-01-07 RX ADMIN — IPRATROPIUM BROMIDE AND ALBUTEROL SULFATE SCH: .5; 3 SOLUTION RESPIRATORY (INHALATION) at 14:20

## 2022-01-07 RX ADMIN — APIXABAN SCH MG: 5 TABLET, FILM COATED ORAL at 09:20

## 2022-01-07 RX ADMIN — IPRATROPIUM BROMIDE AND ALBUTEROL SULFATE SCH: .5; 3 SOLUTION RESPIRATORY (INHALATION) at 09:19

## 2022-01-07 RX ADMIN — MORPHINE SULFATE PRN MG: 2 INJECTION, SOLUTION INTRAMUSCULAR; INTRAVENOUS at 14:59

## 2022-01-07 NOTE — CAT SCAN REPORT
CTA CHEST WITH CONTRAST



INDICATION / CLINICAL INFORMATION: History of DVT with acute respiratory failure.



TECHNIQUE: Axial CT images were obtained through the chest after injection of 100 cc Omnipaque 350 IV
 contrast. 3 plane MIP and/or 3D reconstructions were produced. All CT scans at this location are per
formed using CT dose reduction for ALARA by means of automated exposure control. 



COMPARISON: None available.



FINDINGS:

PULMONARY ARTERIES: Good opacification bilaterally without intraluminal filling defect to suggest acu
te PTE.

THORACIC AORTA: Moderately severe atherosclerotic calcifications and a right aortic arch. There is an
 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 

HEART: Mild cardiomegaly.

CORONARY ARTERY CALCIFICATION: Moderate.

MEDIASTINUM / GENOVEVA: No significant abnormality.

PLEURA: Small right pleural effusion No pneumothorax.

LUNGS: There is dense consolidation in the posterior segment of the right upper lobe and throughout t
he right lower lobe. 



ADDITIONAL FINDINGS: There is mild bilateral gynecomastia.



UPPER ABDOMEN: There is a large mixed cystic and solid mass in the right upper quadrant possibly invo
lving the liver, pancreatic head and duodenum. There are bilateral renal cysts.



SKELETAL STRUCTURES: No significant osseous abnormality.



IMPRESSION:

1. No CT evidence for pulmonary embolism. 

2. Right upper and lower lobe pneumonia. Differential diagnosis includes bacterial pneumonia and aspi
ration pneumonia.

3. Large mixed cystic and solid mass in the right upper quadrant is new since the prior study.



Signer Name: Aries Herron MD 

Signed: 1/7/2022 9:44 PM

Workstation Name: [id number]

## 2022-01-08 LAB
HCT VFR BLD CALC: 30.3 % (ref 35.5–45.6)
HGB BLD-MCNC: 9.4 GM/DL (ref 11.8–15.2)
MCHC RBC AUTO-ENTMCNC: 31 % (ref 32–34)
MCV RBC AUTO: 89 FL (ref 84–94)
PLATELET # BLD: 206 K/MM3 (ref 140–440)
RBC # BLD AUTO: 3.4 M/MM3 (ref 3.65–5.03)

## 2022-01-08 RX ADMIN — IPRATROPIUM BROMIDE AND ALBUTEROL SULFATE SCH AMPUL: .5; 3 SOLUTION RESPIRATORY (INHALATION) at 08:39

## 2022-01-08 RX ADMIN — SPIRONOLACTONE SCH MG: 25 TABLET ORAL at 10:40

## 2022-01-08 RX ADMIN — METOPROLOL TARTRATE SCH MG: 25 TABLET, FILM COATED ORAL at 10:39

## 2022-01-08 RX ADMIN — Medication SCH ML: at 21:36

## 2022-01-08 RX ADMIN — PIPERACILLIN AND TAZOBACTAM SCH MLS/HR: 4; .5 INJECTION, POWDER, FOR SOLUTION INTRAVENOUS at 17:39

## 2022-01-08 RX ADMIN — PIPERACILLIN AND TAZOBACTAM SCH MLS/HR: 4; .5 INJECTION, POWDER, FOR SOLUTION INTRAVENOUS at 10:40

## 2022-01-08 RX ADMIN — APIXABAN SCH MG: 5 TABLET, FILM COATED ORAL at 21:35

## 2022-01-08 RX ADMIN — Medication SCH ML: at 10:40

## 2022-01-08 RX ADMIN — APIXABAN SCH MG: 5 TABLET, FILM COATED ORAL at 10:39

## 2022-01-08 RX ADMIN — METOPROLOL TARTRATE SCH MG: 25 TABLET, FILM COATED ORAL at 21:36

## 2022-01-08 RX ADMIN — FAMOTIDINE SCH MG: 10 TABLET ORAL at 10:39

## 2022-01-08 RX ADMIN — FAMOTIDINE SCH MG: 10 TABLET ORAL at 21:36

## 2022-01-08 RX ADMIN — IPRATROPIUM BROMIDE AND ALBUTEROL SULFATE SCH AMPUL: .5; 3 SOLUTION RESPIRATORY (INHALATION) at 15:09

## 2022-01-08 NOTE — CONSULTATION
History of Present Illness


Reason for consult: pneumonia


History of present illness: 





This is a gentleman withhx of cva, hip fx who was admitted with le swelling. Pt 

was found to have lt le dvt. He had a cta done to evaluate for acute pulmonary 

emboli and this negative but showed bilobar pneumonia on the rt.


He reports cough which is productive. He denies any nausea or vomiting. He 

denies any hx of aspiration.





Past History


Past Medical History: hypertension, stroke, other


Past Surgical History: Other (Right hip fracture)





Medications and Allergies


                                    Allergies











Allergy/AdvReac Type Severity Reaction Status Date / Time


 


No Known Allergies Allergy   Verified 12/30/21 22:39











Active Meds: 


Active Medications





Acetaminophen (Acetaminophen 325 Mg Tab)  650 mg PO Q4H PRN


   PRN Reason: Pain MILD(1-3)/Fever >100.5/HA


   Last Admin: 01/02/22 22:07 Dose:  650 mg


   


Albuterol (Albuterol 2.5 Mg/3 Ml Nebu)  2.5 mg IH Q4HRT PRN


   PRN Reason: Shortness Of Breath


Albuterol/Ipratropium (Ipratropium/Albuterol Sulfate 3 Ml Ampul.Neb)  1 ampul IH

TIDRT Replaced by [facility]


   Last Admin: 01/08/22 08:39 Dose:  1 ampul


   


Apixaban (Apixaban 5 Mg Tab)  10 mg PO Q12HR SHAY; Protocol


   Stop: 01/11/22 22:01


   Last Admin: 01/07/22 21:55 Dose:  10 mg


   


Apixaban (Apixaban 5 Mg Tab)  5 mg PO Q12HR Replaced by [facility]; Protocol


Famotidine (Famotidine 10 Mg Tab)  10 mg PO BID Replaced by [facility]


   Last Admin: 01/07/22 21:55 Dose:  10 mg


   


Hydromorphone HCl (Hydromorphone 1 Mg/1 Ml Inj)  0.5 mg IV Q3H PRN


   PRN Reason: Pain , Severe (7-10)


Piperacillin Sod/Tazobactam Sod (Zosyn/Ns 3.375gm/50ml)  3.375 gm in 50 mls @ 

100 mls/hr IV Q8H Replaced by [facility]; Protocol


Metoprolol Tartrate (Metoprolol Tartrate 25 Mg Tab)  12.5 mg PO BID Replaced by [facility]


   Last Admin: 01/07/22 21:53 Dose:  Not Given


   


Morphine Sulfate (Morphine 2 Mg/1 Ml Inj)  2 mg IV Q4H PRN


   PRN Reason: Pain, Moderate (4-6)


   Last Admin: 01/07/22 14:59 Dose:  2 mg


   


Ondansetron HCl (Ondansetron 4 Mg/2 Ml Inj)  4 mg IV Q8H PRN


   PRN Reason: Nausea And Vomiting


Sodium Chloride (Sodium Chloride 0.9% 10 Ml Flush Syringe)  10 ml IV BID Replaced by [facility]


   Last Admin: 01/07/22 21:54 Dose:  10 ml


   


Sodium Chloride (Sodium Chloride 0.9% 10 Ml Flush Syringe)  10 ml IV PRN PRN


   PRN Reason: LINE FLUSH


Spironolactone (Spironolactone 25 Mg Tab)  25 mg PO QDAY Replaced by [facility]


   Last Admin: 01/07/22 09:20 Dose:  25 mg


   











Review of Systems


Constitutional: fatigue


Respiratory: cough, cough with sputum, congestion





Physical Examination


Vital signs: 


                                   Vital Signs











Temp Pulse Resp BP Pulse Ox


 


 97.8 F   88   20   100/56   96 


 


 12/30/21 22:39  12/30/21 22:39  12/30/21 22:39  12/30/21 22:39  12/30/21 22:39











General appearance: alert


ENT: oropharynx moist


Neck: supple


Ascultation: Bilateral: rhonchi


Cardiovascular: regular rate and rhythm


Gastrointestinal: normoactive bowel sounds, non-distended


Integumentary: normal


Extremities: no cyanosis


Musculoskeletal: no deformities


mood appropriate





Results





- Laboratory Findings


CBC and BMP: 


                                 01/07/22 10:07





                                 01/08/22 08:28


ABG











ABG pH  7.431 pH Units (7.350-7.450)   01/06/22  12:30    


 


ABG pCO2  49.5 mm Hg  01/06/22  12:30    


 


ABG pO2  25.2 mm Hg (80.0-90.0)  L*  01/06/22  12:30    


 


ABG O2 Saturation  41.1 % (95.0-99.0)  L  01/06/22  12:30    





PT/INR, D-dimer











PT  14.5 Sec. (12.2-14.9)   01/05/22  14:48    


 


INR  1.02  (0.87-1.13)   01/05/22  14:48    








Abnormal lab findings: 


                                  Abnormal Labs











  12/30/21 12/30/21 12/31/21





  23:51 23:51 01:00


 


WBC   


 


RBC   


 


Hgb  11.4 L  


 


Hct  35.3 L  


 


MCH  27 L  


 


MCHC   


 


RDW  19.9 H  


 


Lymph % (Auto)  11.4 L  


 


Mono % (Auto)  13.3 H  


 


Mono # (Auto)  1.4 H  


 


Seg Neutrophils %  75.2 H  


 


Seg Neuts % (Manual)   


 


Seg Neutrophils #  8.0 H  


 


PT   


 


INR   


 


APTT   


 


Heparin Anti-Xa Level   


 


ABG pO2   


 


ABG HCO3   


 


ABG O2 Saturation   


 


ABG Base Excess   


 


ABG Hemoglobin   


 


Oxyhemoglobin   


 


Sodium   127 L 


 


Potassium   2.0 L* 


 


Chloride   86.1 L 


 


Carbon Dioxide   


 


Creatinine   


 


Glucose   124 H 


 


Calcium   8.1 L 


 


Total Bilirubin   1.40 H 


 


Alkaline Phosphatase   141 H 


 


NT-Pro-B Natriuret Pep   1300 H 


 


Total Protein   5.9 L 


 


Albumin   2.5 L 


 


Urine WBC (Auto)    9.0 H














  01/01/22 01/01/22 01/03/22





  11:15 11:15 00:06


 


WBC   


 


RBC   


 


Hgb  11.2 L  


 


Hct  35.4 L  


 


MCH   


 


MCHC   


 


RDW  20.6 H  


 


Lymph % (Auto)   


 


Mono % (Auto)   


 


Mono # (Auto)   


 


Seg Neutrophils %   


 


Seg Neuts % (Manual)  72.0 H  


 


Seg Neutrophils #   


 


PT   


 


INR   


 


APTT   


 


Heparin Anti-Xa Level   


 


ABG pO2   


 


ABG HCO3   


 


ABG O2 Saturation   


 


ABG Base Excess   


 


ABG Hemoglobin   


 


Oxyhemoglobin   


 


Sodium   129 L  132 L


 


Potassium   3.3 L D  3.4 L


 


Chloride   93.5 L  95.1 L


 


Carbon Dioxide    21 L


 


Creatinine   


 


Glucose    109 H


 


Calcium   7.9 L  8.1 L


 


Total Bilirubin   


 


Alkaline Phosphatase   


 


NT-Pro-B Natriuret Pep   


 


Total Protein   


 


Albumin   


 


Urine WBC (Auto)   














  01/03/22 01/03/22 01/04/22





  20:38 20:38 00:41


 


WBC   


 


RBC   


 


Hgb  9.0 L  


 


Hct  28.8 L D  


 


MCH   


 


MCHC   


 


RDW   


 


Lymph % (Auto)   


 


Mono % (Auto)   


 


Mono # (Auto)   


 


Seg Neutrophils %   


 


Seg Neuts % (Manual)   


 


Seg Neutrophils #   


 


PT   16.5 H 


 


INR   1.20 H 


 


APTT   67.8 H* 


 


Heparin Anti-Xa Level    0.21 L


 


ABG pO2   


 


ABG HCO3   


 


ABG O2 Saturation   


 


ABG Base Excess   


 


ABG Hemoglobin   


 


Oxyhemoglobin   


 


Sodium   


 


Potassium   


 


Chloride   


 


Carbon Dioxide   


 


Creatinine   


 


Glucose   


 


Calcium   


 


Total Bilirubin   


 


Alkaline Phosphatase   


 


NT-Pro-B Natriuret Pep   


 


Total Protein   


 


Albumin   


 


Urine WBC (Auto)   














  01/04/22 01/04/22 01/04/22





  04:48 10:32 13:35


 


WBC   


 


RBC    3.38 L


 


Hgb    9.5 L


 


Hct    28.8 L


 


MCH   


 


MCHC   


 


RDW    20.8 H


 


Lymph % (Auto)   


 


Mono % (Auto)   


 


Mono # (Auto)   


 


Seg Neutrophils %   


 


Seg Neuts % (Manual)   


 


Seg Neutrophils #   


 


PT   15.7 H 


 


INR   


 


APTT   135.0 H* 


 


Heparin Anti-Xa Level   


 


ABG pO2   


 


ABG HCO3   


 


ABG O2 Saturation   


 


ABG Base Excess   


 


ABG Hemoglobin   


 


Oxyhemoglobin   


 


Sodium   


 


Potassium  3.4 L  


 


Chloride   


 


Carbon Dioxide   


 


Creatinine  0.7 L  


 


Glucose   


 


Calcium  7.9 L  


 


Total Bilirubin   


 


Alkaline Phosphatase   


 


NT-Pro-B Natriuret Pep   


 


Total Protein   


 


Albumin   


 


Urine WBC (Auto)   














  01/04/22 01/04/22 01/05/22





  13:35 13:49 02:23


 


WBC   


 


RBC   


 


Hgb    9.4 L


 


Hct    29.3 L


 


MCH   


 


MCHC   


 


RDW   


 


Lymph % (Auto)   


 


Mono % (Auto)   


 


Mono # (Auto)   


 


Seg Neutrophils %   


 


Seg Neuts % (Manual)   


 


Seg Neutrophils #   


 


PT   


 


INR   


 


APTT   171.6 H* 


 


Heparin Anti-Xa Level   


 


ABG pO2   


 


ABG HCO3   


 


ABG O2 Saturation   


 


ABG Base Excess   


 


ABG Hemoglobin   


 


Oxyhemoglobin   


 


Sodium   


 


Potassium   


 


Chloride   


 


Carbon Dioxide   


 


Creatinine  0.6 L  


 


Glucose   


 


Calcium   


 


Total Bilirubin   


 


Alkaline Phosphatase   


 


NT-Pro-B Natriuret Pep   


 


Total Protein   


 


Albumin   


 


Urine WBC (Auto)   














  01/05/22 01/05/22 01/05/22





  02:23 14:48 14:48


 


WBC   


 


RBC   


 


Hgb   


 


Hct   


 


MCH   


 


MCHC   


 


RDW   


 


Lymph % (Auto)   


 


Mono % (Auto)   


 


Mono # (Auto)   


 


Seg Neutrophils %   


 


Seg Neuts % (Manual)   


 


Seg Neutrophils #   


 


PT   


 


INR   


 


APTT   


 


Heparin Anti-Xa Level  < 0.10 L  < 0.10 L 


 


ABG pO2   


 


ABG HCO3   


 


ABG O2 Saturation   


 


ABG Base Excess   


 


ABG Hemoglobin   


 


Oxyhemoglobin   


 


Sodium    136 L


 


Potassium   


 


Chloride    97.2 L


 


Carbon Dioxide   


 


Creatinine    0.6 L


 


Glucose   


 


Calcium   


 


Total Bilirubin   


 


Alkaline Phosphatase   


 


NT-Pro-B Natriuret Pep   


 


Total Protein   


 


Albumin   


 


Urine WBC (Auto)   














  01/05/22 01/05/22 01/06/22





  14:48 Unknown 12:30


 


WBC   


 


RBC   


 


Hgb   11.0 L 


 


Hct   34.7 L 


 


MCH   


 


MCHC   


 


RDW   21.0 H 


 


Lymph % (Auto)   


 


Mono % (Auto)   


 


Mono # (Auto)   


 


Seg Neutrophils %   


 


Seg Neuts % (Manual)   


 


Seg Neutrophils #   


 


PT   


 


INR   


 


APTT   


 


Heparin Anti-Xa Level   


 


ABG pO2    25.2 L*


 


ABG HCO3    32.2 H


 


ABG O2 Saturation    41.1 L


 


ABG Base Excess    6.6 H


 


ABG Hemoglobin    10.2 L


 


Oxyhemoglobin    40.1 L


 


Sodium   


 


Potassium   


 


Chloride   


 


Carbon Dioxide   


 


Creatinine  0.6 L  


 


Glucose   


 


Calcium   


 


Total Bilirubin   


 


Alkaline Phosphatase   


 


NT-Pro-B Natriuret Pep   


 


Total Protein   


 


Albumin   


 


Urine WBC (Auto)   














  01/06/22 01/07/22 01/07/22





  13:22 10:07 10:07


 


WBC  19.9 H  16.7 H 


 


RBC   


 


Hgb  10.8 L  10.7 L 


 


Hct  34.6 L  34.7 L 


 


MCH   27 L 


 


MCHC  31 L  31 L 


 


RDW  21.1 H  20.8 H 


 


Lymph % (Auto)   


 


Mono % (Auto)   


 


Mono # (Auto)   


 


Seg Neutrophils %   


 


Seg Neuts % (Manual)   


 


Seg Neutrophils #   


 


PT   


 


INR   


 


APTT   


 


Heparin Anti-Xa Level   


 


ABG pO2   


 


ABG HCO3   


 


ABG O2 Saturation   


 


ABG Base Excess   


 


ABG Hemoglobin   


 


Oxyhemoglobin   


 


Sodium   


 


Potassium   


 


Chloride   


 


Carbon Dioxide   


 


Creatinine    0.6 L


 


Glucose   


 


Calcium   


 


Total Bilirubin   


 


Alkaline Phosphatase   


 


NT-Pro-B Natriuret Pep   


 


Total Protein   


 


Albumin   


 


Urine WBC (Auto)   














- Diagnostic Findings


Chest x-ray: report reviewed, image reviewed


CT scan - chest: report reviewed, image reviewed





Assessment and Plan





- Patient Problems


(1) Pneumonia


Current Visit: Yes   Status: Acute   





(2) CVA (cerebral vascular accident)


Current Visit: Yes   Status: Acute   





(3) Edema


Current Visit: Yes   Status: Acute   





(4) Hypokalemia


Current Visit: Yes   Status: Acute   





(5) Nonsustained ventricular tachycardia


Current Visit: Yes   Status: Acute

## 2022-01-09 LAB
ALBUMIN SERPL-MCNC: 2.5 G/DL (ref 3.9–5)
ALT SERPL-CCNC: 15 UNITS/L (ref 7–56)
BUN SERPL-MCNC: 13 MG/DL (ref 9–20)
BUN/CREAT SERPL: 16 %
CALCIUM SERPL-MCNC: 8.6 MG/DL (ref 8.4–10.2)
HCT VFR BLD CALC: 31.7 % (ref 35.5–45.6)
HEMOLYSIS INDEX: 33
HGB BLD-MCNC: 10 GM/DL (ref 11.8–15.2)
MCHC RBC AUTO-ENTMCNC: 32 % (ref 32–34)
MCV RBC AUTO: 89 FL (ref 84–94)
PLATELET # BLD: 222 K/MM3 (ref 140–440)
RBC # BLD AUTO: 3.56 M/MM3 (ref 3.65–5.03)

## 2022-01-09 RX ADMIN — METOPROLOL TARTRATE SCH MG: 25 TABLET, FILM COATED ORAL at 22:06

## 2022-01-09 RX ADMIN — METOPROLOL TARTRATE SCH MG: 25 TABLET, FILM COATED ORAL at 09:50

## 2022-01-09 RX ADMIN — APIXABAN SCH MG: 5 TABLET, FILM COATED ORAL at 22:06

## 2022-01-09 RX ADMIN — Medication SCH ML: at 22:07

## 2022-01-09 RX ADMIN — PIPERACILLIN AND TAZOBACTAM SCH MLS/HR: 4; .5 INJECTION, POWDER, FOR SOLUTION INTRAVENOUS at 09:49

## 2022-01-09 RX ADMIN — IPRATROPIUM BROMIDE AND ALBUTEROL SULFATE SCH AMPUL: .5; 3 SOLUTION RESPIRATORY (INHALATION) at 20:53

## 2022-01-09 RX ADMIN — SPIRONOLACTONE SCH MG: 25 TABLET ORAL at 09:49

## 2022-01-09 RX ADMIN — PIPERACILLIN AND TAZOBACTAM SCH MLS/HR: 4; .5 INJECTION, POWDER, FOR SOLUTION INTRAVENOUS at 02:33

## 2022-01-09 RX ADMIN — IPRATROPIUM BROMIDE AND ALBUTEROL SULFATE SCH: .5; 3 SOLUTION RESPIRATORY (INHALATION) at 20:52

## 2022-01-09 RX ADMIN — FAMOTIDINE SCH MG: 10 TABLET ORAL at 09:49

## 2022-01-09 RX ADMIN — Medication SCH ML: at 09:50

## 2022-01-09 RX ADMIN — DEXTROSE AND SODIUM CHLORIDE SCH MLS/HR: 5; .9 INJECTION, SOLUTION INTRAVENOUS at 21:45

## 2022-01-09 RX ADMIN — APIXABAN SCH MG: 5 TABLET, FILM COATED ORAL at 09:50

## 2022-01-09 RX ADMIN — FAMOTIDINE SCH MG: 10 TABLET ORAL at 22:07

## 2022-01-09 RX ADMIN — PIPERACILLIN AND TAZOBACTAM SCH MLS/HR: 4; .5 INJECTION, POWDER, FOR SOLUTION INTRAVENOUS at 18:40

## 2022-01-09 RX ADMIN — IPRATROPIUM BROMIDE AND ALBUTEROL SULFATE SCH: .5; 3 SOLUTION RESPIRATORY (INHALATION) at 20:53

## 2022-01-09 NOTE — EVENT NOTE
renal function has normalized patient is doing well from renal standpoint will 

sign off please call if needed at [phone]

## 2022-01-10 LAB
BASOPHILS # (AUTO): 0 K/MM3 (ref 0–0.1)
BASOPHILS NFR BLD AUTO: 0.4 % (ref 0–1.8)
BUN SERPL-MCNC: 10 MG/DL (ref 9–20)
BUN/CREAT SERPL: 14 %
CALCIUM SERPL-MCNC: 8.5 MG/DL (ref 8.4–10.2)
EOSINOPHIL # BLD AUTO: 0 K/MM3 (ref 0–0.4)
EOSINOPHIL NFR BLD AUTO: 0.3 % (ref 0–4.3)
HCT VFR BLD CALC: 27.4 % (ref 35.5–45.6)
HEMOLYSIS INDEX: 88
HGB BLD-MCNC: 8.6 GM/DL (ref 11.8–15.2)
LYMPHOCYTES # BLD AUTO: 1.7 K/MM3 (ref 1.2–5.4)
LYMPHOCYTES NFR BLD AUTO: 20 % (ref 13.4–35)
MCHC RBC AUTO-ENTMCNC: 31 % (ref 32–34)
MCV RBC AUTO: 86 FL (ref 84–94)
MONOCYTES # (AUTO): 1 K/MM3 (ref 0–0.8)
MONOCYTES % (AUTO): 12.3 % (ref 0–7.3)
PLATELET # BLD: 241 K/MM3 (ref 140–440)
RBC # BLD AUTO: 3.17 M/MM3 (ref 3.65–5.03)

## 2022-01-10 RX ADMIN — FAMOTIDINE SCH MG: 10 TABLET ORAL at 09:33

## 2022-01-10 RX ADMIN — FAMOTIDINE SCH MG: 10 TABLET ORAL at 22:23

## 2022-01-10 RX ADMIN — Medication SCH ML: at 09:34

## 2022-01-10 RX ADMIN — METOPROLOL TARTRATE SCH MG: 25 TABLET, FILM COATED ORAL at 09:34

## 2022-01-10 RX ADMIN — DEXTROSE AND SODIUM CHLORIDE SCH MLS/HR: 5; .9 INJECTION, SOLUTION INTRAVENOUS at 18:19

## 2022-01-10 RX ADMIN — PIPERACILLIN AND TAZOBACTAM SCH MLS/HR: 4; .5 INJECTION, POWDER, FOR SOLUTION INTRAVENOUS at 18:18

## 2022-01-10 RX ADMIN — PIPERACILLIN AND TAZOBACTAM SCH MLS/HR: 4; .5 INJECTION, POWDER, FOR SOLUTION INTRAVENOUS at 02:00

## 2022-01-10 RX ADMIN — IPRATROPIUM BROMIDE AND ALBUTEROL SULFATE SCH AMPUL: .5; 3 SOLUTION RESPIRATORY (INHALATION) at 21:57

## 2022-01-10 RX ADMIN — Medication SCH ML: at 22:24

## 2022-01-10 RX ADMIN — IPRATROPIUM BROMIDE AND ALBUTEROL SULFATE SCH AMPUL: .5; 3 SOLUTION RESPIRATORY (INHALATION) at 15:27

## 2022-01-10 RX ADMIN — IPRATROPIUM BROMIDE AND ALBUTEROL SULFATE SCH AMPUL: .5; 3 SOLUTION RESPIRATORY (INHALATION) at 09:46

## 2022-01-10 RX ADMIN — APIXABAN SCH MG: 5 TABLET, FILM COATED ORAL at 22:23

## 2022-01-10 RX ADMIN — SPIRONOLACTONE SCH MG: 25 TABLET ORAL at 09:34

## 2022-01-10 RX ADMIN — APIXABAN SCH MG: 5 TABLET, FILM COATED ORAL at 09:34

## 2022-01-10 RX ADMIN — PIPERACILLIN AND TAZOBACTAM SCH MLS/HR: 4; .5 INJECTION, POWDER, FOR SOLUTION INTRAVENOUS at 09:39

## 2022-01-10 RX ADMIN — METOPROLOL TARTRATE SCH MG: 25 TABLET, FILM COATED ORAL at 22:23

## 2022-01-10 NOTE — CONSULTATION
History of Present Illness





- Reason for Consult


Consult date: 01/10/22


pneumonia


Requesting physician: JENNIFER BAUTISTA





- History of Present Illness





The patient is a 76-year-old male with hypertension, CVA, history of right hip 

fracture was admitted with leg swelling, anasarca.  Concern was for possible 

nephrotic syndrome, was seen by nephrology.  Echocardiogram showed EF of 55 to 

65% with mild diastolic dysfunction.  Patient also found to have a DVT in the 

lower extremity, started on anticoagulation.  Patient developed worsening 

hypoxia, CTA revealed no PE but showed right upper lobe and middle lobe 

pneumonia concerning for aspiration.  Started on Zosyn and infectious diseases 

was consulted for additional evaluation.  He has been afebrile.  He is drowsy, 

is a poor historian.





Review of Systems: 


Poor historian with limited ROS and history





Past History


Past Medical History: hypertension, stroke, other


Past Surgical History: Other (Right hip fracture)





Medications and Allergies


                                    Allergies











Allergy/AdvReac Type Severity Reaction Status Date / Time


 


No Known Allergies Allergy   Verified 12/30/21 22:39











Active Meds: 


Active Medications





Acetaminophen (Acetaminophen 325 Mg Tab)  650 mg PO Q4H PRN


   PRN Reason: Pain MILD(1-3)/Fever >100.5/HA


   Last Admin: 01/02/22 22:07 Dose:  650 mg


   


Albuterol (Albuterol 2.5 Mg/3 Ml Nebu)  2.5 mg IH Q4HRT PRN


   PRN Reason: Shortness Of Breath


Albuterol/Ipratropium (Ipratropium/Albuterol Sulfate 3 Ml Ampul.Neb)  1 ampul IH

TIDRT SHAY


   Last Admin: 01/10/22 09:46 Dose:  1 ampul


   


Apixaban (Apixaban 5 Mg Tab)  10 mg PO Q12HR SHAY; Protocol


   Stop: 01/11/22 22:01


   Last Admin: 01/10/22 09:34 Dose:  10 mg


   


Apixaban (Apixaban 5 Mg Tab)  5 mg PO Q12HR SHAY; Protocol


Famotidine (Famotidine 10 Mg Tab)  10 mg PO BID SHAY


   Last Admin: 01/10/22 09:33 Dose:  10 mg


   


Hydromorphone HCl (Hydromorphone 1 Mg/1 Ml Inj)  0.5 mg IV Q3H PRN


   PRN Reason: Pain , Severe (7-10)


Piperacillin Sod/Tazobactam Sod (Zosyn/Ns 4.5gm/100ml)  4.5 gm in 100 mls @ 200 

mls/hr IV Q8H [facility]


   Last Admin: 01/10/22 09:39 Dose:  200 mls/hr


   


Dextrose/Sodium Chloride (D5ns)  1,000 mls @ 75 mls/hr IV AS DIRECT [facility]


   Last Admin: 01/09/22 21:45 Dose:  75 mls/hr


   


Metoprolol Tartrate (Metoprolol Tartrate 25 Mg Tab)  12.5 mg PO BID [facility]


   Last Admin: 01/10/22 09:34 Dose:  12.5 mg


   


Morphine Sulfate (Morphine 2 Mg/1 Ml Inj)  2 mg IV Q4H PRN


   PRN Reason: Pain, Moderate (4-6)


   Last Admin: 01/07/22 14:59 Dose:  2 mg


   


Ondansetron HCl (Ondansetron 4 Mg/2 Ml Inj)  4 mg IV Q8H PRN


   PRN Reason: Nausea And Vomiting


Sodium Chloride (Sodium Chloride 0.9% 10 Ml Flush Syringe)  10 ml IV BID [facility]


   Last Admin: 01/10/22 09:34 Dose:  10 ml


   


Sodium Chloride (Sodium Chloride 0.9% 10 Ml Flush Syringe)  10 ml IV PRN PRN


   PRN Reason: LINE FLUSH


Spironolactone (Spironolactone 25 Mg Tab)  25 mg PO QDAY [facility]


   Last Admin: 01/10/22 09:34 Dose:  25 mg


   











Physical Examination





- Physical Exam


Narrative exam: 





Physical Exam: 


Constitutional: Drowsy, no distress


head, Ears, Nose: Normocephalic, atraumatic. External ears, nose normal


Eyes: Conjunctivae/corneas clear. No icterus. No ptosis.


Neck: Supple, no meningeal signs


Cardiovascular: S1, S2 +


Respiratory: Good air entry, clear to auscultation bilaterally


GI: Soft, non-tender; bowel sounds normal. No peritoneal signs


Musculoskeletal: Pedal edema present, scabs on bilateral lower extremities


skin: No rash or abscess


Hem/Lymphatic: No palpable cervical or supraclavicular nodes. No lymphangitis


Psych: No agitation


Neurological: Sleeping, no distress





- Constitutional


Vitals: 


                                   Vital Signs











Temp Pulse Resp BP Pulse Ox


 


 98.3 F   72   20   129/80   98 


 


 01/10/22 08:54  01/10/22 09:46  01/10/22 09:46  01/10/22 08:54  01/10/22 09:46








                           Temperature -Last 24 Hours











Temperature                    98.3 F


 


Temperature                    98.3 F


 


Temperature                    98.5 F


 


Temperature                    97.5 F


 


Temperature                    97.9 F

















Results





- Labs


CBC & Chem 7: 


                                 01/09/22 07:41





                                 01/09/22 13:11





- Imaging and Cardiology


Chest x-ray: report reviewed, image reviewed


CT scan - chest: report reviewed, image reviewed (R pneumonia)





Assessment and Plan





Cultures:


12/31/2021 urine culture: No growth





A/P:


76-year-old male with hypertension, CVA, history of right hip fracture was 

admitted with leg swelling, anasarca:





#Leukocytosis, secondary to acute aspiration pneumonia causing hypoxic 

respiratory failure: No fever.  Leukocytosis is much improved.





#DVT, on anticoagulation





Recs:


-Complete 5 days of IV Zosyn





Will sign off.  Please call with questions





Jeff Tee MD, [facility] ([facility]


O: [phone]


F: [phone]

## 2022-01-11 LAB
HCT VFR BLD CALC: 26 % (ref 35.5–45.6)
HGB BLD-MCNC: 8.3 GM/DL (ref 11.8–15.2)
MCHC RBC AUTO-ENTMCNC: 32 % (ref 32–34)
MCV RBC AUTO: 88 FL (ref 84–94)
PLATELET # BLD: 222 K/MM3 (ref 140–440)
RBC # BLD AUTO: 2.97 M/MM3 (ref 3.65–5.03)

## 2022-01-11 RX ADMIN — SPIRONOLACTONE SCH MG: 25 TABLET ORAL at 11:15

## 2022-01-11 RX ADMIN — IPRATROPIUM BROMIDE AND ALBUTEROL SULFATE SCH AMPUL: .5; 3 SOLUTION RESPIRATORY (INHALATION) at 21:35

## 2022-01-11 RX ADMIN — IPRATROPIUM BROMIDE AND ALBUTEROL SULFATE SCH AMPUL: .5; 3 SOLUTION RESPIRATORY (INHALATION) at 09:34

## 2022-01-11 RX ADMIN — METOPROLOL TARTRATE SCH MG: 25 TABLET, FILM COATED ORAL at 21:30

## 2022-01-11 RX ADMIN — FAMOTIDINE SCH MG: 10 TABLET ORAL at 11:15

## 2022-01-11 RX ADMIN — METOPROLOL TARTRATE SCH: 25 TABLET, FILM COATED ORAL at 11:15

## 2022-01-11 RX ADMIN — APIXABAN SCH MG: 5 TABLET, FILM COATED ORAL at 21:30

## 2022-01-11 RX ADMIN — Medication SCH ML: at 21:31

## 2022-01-11 RX ADMIN — PIPERACILLIN AND TAZOBACTAM SCH MLS/HR: 4; .5 INJECTION, POWDER, FOR SOLUTION INTRAVENOUS at 01:57

## 2022-01-11 RX ADMIN — PIPERACILLIN AND TAZOBACTAM SCH MLS/HR: 4; .5 INJECTION, POWDER, FOR SOLUTION INTRAVENOUS at 18:38

## 2022-01-11 RX ADMIN — IPRATROPIUM BROMIDE AND ALBUTEROL SULFATE SCH AMPUL: .5; 3 SOLUTION RESPIRATORY (INHALATION) at 15:38

## 2022-01-11 RX ADMIN — APIXABAN SCH MG: 5 TABLET, FILM COATED ORAL at 11:15

## 2022-01-11 RX ADMIN — FAMOTIDINE SCH MG: 10 TABLET ORAL at 21:30

## 2022-01-11 RX ADMIN — PIPERACILLIN AND TAZOBACTAM SCH MLS/HR: 4; .5 INJECTION, POWDER, FOR SOLUTION INTRAVENOUS at 11:18

## 2022-01-11 RX ADMIN — DEXTROSE AND SODIUM CHLORIDE SCH MLS/HR: 5; .9 INJECTION, SOLUTION INTRAVENOUS at 18:38

## 2022-01-11 RX ADMIN — Medication SCH ML: at 11:16

## 2022-01-12 LAB
HCT VFR BLD CALC: 29.1 % (ref 35.5–45.6)
HGB BLD-MCNC: 9.1 GM/DL (ref 11.8–15.2)
PLATELET # BLD: 210 K/MM3 (ref 140–440)

## 2022-01-12 RX ADMIN — METOPROLOL TARTRATE SCH: 25 TABLET, FILM COATED ORAL at 21:10

## 2022-01-12 RX ADMIN — FAMOTIDINE SCH MG: 10 TABLET ORAL at 14:21

## 2022-01-12 RX ADMIN — FAMOTIDINE SCH MG: 10 TABLET ORAL at 21:08

## 2022-01-12 RX ADMIN — METOPROLOL TARTRATE SCH MG: 25 TABLET, FILM COATED ORAL at 14:21

## 2022-01-12 RX ADMIN — IPRATROPIUM BROMIDE AND ALBUTEROL SULFATE SCH AMPUL: .5; 3 SOLUTION RESPIRATORY (INHALATION) at 08:00

## 2022-01-12 RX ADMIN — PIPERACILLIN AND TAZOBACTAM SCH MLS/HR: 4; .5 INJECTION, POWDER, FOR SOLUTION INTRAVENOUS at 02:30

## 2022-01-12 RX ADMIN — PIPERACILLIN AND TAZOBACTAM SCH MLS/HR: 4; .5 INJECTION, POWDER, FOR SOLUTION INTRAVENOUS at 20:05

## 2022-01-12 RX ADMIN — Medication SCH ML: at 21:10

## 2022-01-12 RX ADMIN — IPRATROPIUM BROMIDE AND ALBUTEROL SULFATE SCH AMPUL: .5; 3 SOLUTION RESPIRATORY (INHALATION) at 20:46

## 2022-01-12 RX ADMIN — PIPERACILLIN AND TAZOBACTAM SCH MLS/HR: 4; .5 INJECTION, POWDER, FOR SOLUTION INTRAVENOUS at 14:14

## 2022-01-12 RX ADMIN — Medication SCH ML: at 14:27

## 2022-01-12 RX ADMIN — APIXABAN SCH MG: 5 TABLET, FILM COATED ORAL at 14:26

## 2022-01-12 RX ADMIN — DEXTROSE AND SODIUM CHLORIDE SCH MLS/HR: 5; .9 INJECTION, SOLUTION INTRAVENOUS at 14:14

## 2022-01-12 RX ADMIN — SPIRONOLACTONE SCH MG: 25 TABLET ORAL at 14:26

## 2022-01-12 RX ADMIN — APIXABAN SCH MG: 5 TABLET, FILM COATED ORAL at 21:08

## 2022-01-13 LAB
BUN SERPL-MCNC: 12 MG/DL (ref 9–20)
BUN/CREAT SERPL: 20 %
CALCIUM SERPL-MCNC: 8.1 MG/DL (ref 8.4–10.2)
HEMOLYSIS INDEX: 13

## 2022-01-13 RX ADMIN — FAMOTIDINE SCH MG: 10 TABLET ORAL at 21:52

## 2022-01-13 RX ADMIN — PIPERACILLIN AND TAZOBACTAM SCH MLS/HR: 4; .5 INJECTION, POWDER, FOR SOLUTION INTRAVENOUS at 02:44

## 2022-01-13 RX ADMIN — Medication SCH ML: at 09:30

## 2022-01-13 RX ADMIN — IPRATROPIUM BROMIDE AND ALBUTEROL SULFATE SCH AMPUL: .5; 3 SOLUTION RESPIRATORY (INHALATION) at 08:58

## 2022-01-13 RX ADMIN — IPRATROPIUM BROMIDE AND ALBUTEROL SULFATE SCH AMPUL: .5; 3 SOLUTION RESPIRATORY (INHALATION) at 15:07

## 2022-01-13 RX ADMIN — APIXABAN SCH MG: 5 TABLET, FILM COATED ORAL at 21:52

## 2022-01-13 RX ADMIN — DEXTROSE AND SODIUM CHLORIDE SCH MLS/HR: 5; .9 INJECTION, SOLUTION INTRAVENOUS at 21:53

## 2022-01-13 RX ADMIN — SPIRONOLACTONE SCH MG: 25 TABLET ORAL at 09:30

## 2022-01-13 RX ADMIN — FAMOTIDINE SCH MG: 10 TABLET ORAL at 09:29

## 2022-01-13 RX ADMIN — Medication SCH ML: at 21:59

## 2022-01-13 RX ADMIN — DEXTROSE AND SODIUM CHLORIDE SCH MLS/HR: 5; .9 INJECTION, SOLUTION INTRAVENOUS at 04:05

## 2022-01-13 RX ADMIN — IPRATROPIUM BROMIDE AND ALBUTEROL SULFATE SCH AMPUL: .5; 3 SOLUTION RESPIRATORY (INHALATION) at 08:57

## 2022-01-13 RX ADMIN — METOPROLOL TARTRATE SCH MG: 25 TABLET, FILM COATED ORAL at 09:29

## 2022-01-13 RX ADMIN — IPRATROPIUM BROMIDE AND ALBUTEROL SULFATE SCH AMPUL: .5; 3 SOLUTION RESPIRATORY (INHALATION) at 21:20

## 2022-01-13 RX ADMIN — METOPROLOL TARTRATE SCH MG: 25 TABLET, FILM COATED ORAL at 21:52

## 2022-01-13 RX ADMIN — APIXABAN SCH MG: 5 TABLET, FILM COATED ORAL at 09:29

## 2022-01-14 LAB
BUN SERPL-MCNC: 9 MG/DL (ref 9–20)
BUN/CREAT SERPL: 15 %
CALCIUM SERPL-MCNC: 8.1 MG/DL (ref 8.4–10.2)
HEMOLYSIS INDEX: 5

## 2022-01-14 RX ADMIN — FAMOTIDINE SCH MG: 10 TABLET ORAL at 09:34

## 2022-01-14 RX ADMIN — METOPROLOL TARTRATE SCH MG: 25 TABLET, FILM COATED ORAL at 21:27

## 2022-01-14 RX ADMIN — POTASSIUM CHLORIDE SCH MLS/HR: 10 INJECTION, SOLUTION INTRAVENOUS at 11:38

## 2022-01-14 RX ADMIN — IPRATROPIUM BROMIDE AND ALBUTEROL SULFATE SCH AMPUL: .5; 3 SOLUTION RESPIRATORY (INHALATION) at 19:51

## 2022-01-14 RX ADMIN — POTASSIUM CHLORIDE SCH MLS/HR: 10 INJECTION, SOLUTION INTRAVENOUS at 12:59

## 2022-01-14 RX ADMIN — SPIRONOLACTONE SCH MG: 25 TABLET ORAL at 09:34

## 2022-01-14 RX ADMIN — POTASSIUM CHLORIDE SCH MLS/HR: 10 INJECTION, SOLUTION INTRAVENOUS at 11:37

## 2022-01-14 RX ADMIN — Medication SCH ML: at 09:35

## 2022-01-14 RX ADMIN — IPRATROPIUM BROMIDE AND ALBUTEROL SULFATE SCH AMPUL: .5; 3 SOLUTION RESPIRATORY (INHALATION) at 09:19

## 2022-01-14 RX ADMIN — APIXABAN SCH MG: 5 TABLET, FILM COATED ORAL at 09:34

## 2022-01-14 RX ADMIN — METOPROLOL TARTRATE SCH MG: 25 TABLET, FILM COATED ORAL at 09:34

## 2022-01-14 RX ADMIN — APIXABAN SCH MG: 5 TABLET, FILM COATED ORAL at 21:26

## 2022-01-14 RX ADMIN — IPRATROPIUM BROMIDE AND ALBUTEROL SULFATE SCH AMPUL: .5; 3 SOLUTION RESPIRATORY (INHALATION) at 15:07

## 2022-01-14 RX ADMIN — Medication SCH ML: at 21:28

## 2022-01-14 RX ADMIN — POTASSIUM CHLORIDE SCH MLS/HR: 10 INJECTION, SOLUTION INTRAVENOUS at 12:57

## 2022-01-14 RX ADMIN — DEXTROSE AND SODIUM CHLORIDE SCH MLS/HR: 5; .9 INJECTION, SOLUTION INTRAVENOUS at 09:44

## 2022-01-14 RX ADMIN — FAMOTIDINE SCH MG: 10 TABLET ORAL at 21:27

## 2022-01-14 RX ADMIN — POTASSIUM CHLORIDE SCH MLS/HR: 2 INJECTION, SOLUTION, CONCENTRATE INTRAVENOUS at 11:31

## 2022-01-14 NOTE — DISCHARGE SUMMARY
Providers





- Providers


Date of Admission: 


12/31/21 03:52





Date of discharge: 01/14/22


Attending physician: 


MARY ANNE BLANTON MD





                                        





12/31/21 03:51


Consult to Physician [CONS] Routine 


   Comment: 


   Consulting Provider: PARISH GALLARDO


   Physician Instructions: 


   Reason For Exam: Anasarca


Consult to Physician [CONS] Routine 


   Comment: 


   Consulting Provider: MANGO SILVERIO


   Physician Instructions: 


   Reason For Exam: Nephrotic syndrome





12/31/21 03:53


Consult to Dietitian/Nutrition [CONS] Routine 


   Physician Instructions: 


   Reason For Exam: 


   Reason for Consult: Diet education





01/05/22 09:52


Consult to Dietitian/Nutrition [CONS] Routine 


   Physician Instructions: 


   Reason For Exam: 


   Reason for Consult: Poor oral intake





01/07/22 10:30


Consult to Physician [CONS] Routine 


   Comment: 


   Consulting Provider: DANIEL JJ


   Physician Instructions: 


   Reason For Exam: Respiratory failure





01/07/22 12:40


PICC Line Insertion [Consult to PICC Line RN] [CONS] Urgent 


   Reason For Exam: requires ct scan/ better iv access for long term


   Type Line:: PICC





01/07/22 13:24


Midline [Consult to PICC Line RN] [CONS] Urgent 


   Reason For Exam: unable to obtain iv access


   Type Line:: Midline





01/08/22 08:09


Consult to Physician [CONS] Routine 


   Comment: 


   Consulting Provider: CAYLA HARVEY


   Physician Instructions: 


   Reason For Exam: PNA





01/08/22 09:51


Speech Therapy Evaluation and Treat [CONS] Routine 


   Reason For Exam: possible asp pneum





01/10/22 10:13


Physical Therapy Evaluation and Treat [CONS] Routine 


   Comment: 


   Reason For Exam: deconditioning











Primary care physician: 


PRIMARY CARE MD








Hospitalization


Condition: Stable


Disposition: 06 HOME HEALTH CARE SERVICE


Final Discharge Diagnosis (Prints w/discharge instructions): Aspiration 

pneumonia.  Acute hypoxic respiratory failure, resolved.  Dysphagia, on pured 

diet.  Acute lower extremity DVT.  NSVT.  Likely underlying dementia.  

Chronically nonambulatory





Exam





- Constitutional


Vitals: 


                                        











Temp Pulse Resp BP Pulse Ox


 


 98.3 F   69   18   141/69   96 


 


 01/14/22 03:43  01/14/22 03:43  01/14/22 03:43  01/14/22 03:43  01/14/22 03:43











General appearance: Present: no acute distress, other (Awake, verbal but 

somewhat confused)





- EENT


Eyes: Present: PERRL, EOM intact


ENT: hearing intact





- Neck


Neck: Present: supple





- Respiratory


Respiratory effort: normal


Respiratory: bilateral: CTA (Coarse breath sounds)





- Cardiovascular


Rhythm: regular





- Extremities


Extremities: No edema





- Abdominal


General gastrointestinal: Present: soft, non-tender, normal bowel sounds





- Integumentary


Integumentary: Absent: rash





- Neurologic


Neurologic: other (Awake, fairly alert, verbal, confused.)





Plan


Follow up with: 


PRIMARY CARE,MD [Primary Care Provider] - 7 Days

## 2022-01-15 VITALS — DIASTOLIC BLOOD PRESSURE: 59 MMHG | SYSTOLIC BLOOD PRESSURE: 129 MMHG

## 2022-01-15 LAB
ALBUMIN SERPL-MCNC: 2.2 G/DL (ref 3.9–5)
ALT SERPL-CCNC: 13 UNITS/L (ref 7–56)
BASOPHILS # (AUTO): 0.1 K/MM3 (ref 0–0.1)
BASOPHILS NFR BLD AUTO: 1.5 % (ref 0–1.8)
BUN SERPL-MCNC: 7 MG/DL (ref 9–20)
BUN/CREAT SERPL: 14 %
CALCIUM SERPL-MCNC: 8.3 MG/DL (ref 8.4–10.2)
EOSINOPHIL # BLD AUTO: 0.1 K/MM3 (ref 0–0.4)
EOSINOPHIL NFR BLD AUTO: 1 % (ref 0–4.3)
HCT VFR BLD CALC: 29.1 % (ref 35.5–45.6)
HEMOLYSIS INDEX: 1
HGB BLD-MCNC: 9.3 GM/DL (ref 11.8–15.2)
LYMPHOCYTES # BLD AUTO: 1.7 K/MM3 (ref 1.2–5.4)
LYMPHOCYTES NFR BLD AUTO: 19.8 % (ref 13.4–35)
MCHC RBC AUTO-ENTMCNC: 32 % (ref 32–34)
MCV RBC AUTO: 89 FL (ref 84–94)
MONOCYTES # (AUTO): 1 K/MM3 (ref 0–0.8)
MONOCYTES % (AUTO): 11 % (ref 0–7.3)
PLATELET # BLD: 287 K/MM3 (ref 140–440)
RBC # BLD AUTO: 3.27 M/MM3 (ref 3.65–5.03)

## 2022-01-15 RX ADMIN — IPRATROPIUM BROMIDE AND ALBUTEROL SULFATE SCH AMPUL: .5; 3 SOLUTION RESPIRATORY (INHALATION) at 15:04

## 2022-01-15 RX ADMIN — Medication SCH: at 22:20

## 2022-01-15 RX ADMIN — SPIRONOLACTONE SCH MG: 25 TABLET ORAL at 09:09

## 2022-01-15 RX ADMIN — APIXABAN SCH MG: 5 TABLET, FILM COATED ORAL at 22:20

## 2022-01-15 RX ADMIN — IPRATROPIUM BROMIDE AND ALBUTEROL SULFATE SCH AMPUL: .5; 3 SOLUTION RESPIRATORY (INHALATION) at 08:48

## 2022-01-15 RX ADMIN — FAMOTIDINE SCH MG: 10 TABLET ORAL at 22:20

## 2022-01-15 RX ADMIN — IPRATROPIUM BROMIDE AND ALBUTEROL SULFATE SCH AMPUL: .5; 3 SOLUTION RESPIRATORY (INHALATION) at 19:52

## 2022-01-15 RX ADMIN — APIXABAN SCH MG: 5 TABLET, FILM COATED ORAL at 09:09

## 2022-01-15 RX ADMIN — METOPROLOL TARTRATE SCH MG: 25 TABLET, FILM COATED ORAL at 09:09

## 2022-01-15 RX ADMIN — POTASSIUM CHLORIDE SCH MLS/HR: 2 INJECTION, SOLUTION, CONCENTRATE INTRAVENOUS at 04:22

## 2022-01-15 RX ADMIN — Medication SCH ML: at 09:09

## 2022-01-15 RX ADMIN — POTASSIUM CHLORIDE SCH MLS/HR: 2 INJECTION, SOLUTION, CONCENTRATE INTRAVENOUS at 16:20

## 2022-01-15 RX ADMIN — FAMOTIDINE SCH MG: 10 TABLET ORAL at 09:09

## 2022-01-15 NOTE — DISCHARGE SUMMARY
Providers





- Providers


Date of Admission: 


12/31/21 03:52





Attending physician: 


KODY JAMES





                                        





12/31/21 03:51


Consult to Physician [CONS] Routine 


   Comment: 


   Consulting Provider: PARISH GALLARDO


   Physician Instructions: 


   Reason For Exam: Anasarca


Consult to Physician [CONS] Routine 


   Comment: 


   Consulting Provider: MANGO SILVERIO


   Physician Instructions: 


   Reason For Exam: Nephrotic syndrome





12/31/21 03:53


Consult to Dietitian/Nutrition [CONS] Routine 


   Physician Instructions: 


   Reason For Exam: 


   Reason for Consult: Diet education





01/05/22 09:52


Consult to Dietitian/Nutrition [CONS] Routine 


   Physician Instructions: 


   Reason For Exam: 


   Reason for Consult: Poor oral intake





01/07/22 12:40


PICC Line Insertion [Consult to PICC Line RN] [CONS] Urgent 


   Reason For Exam: requires ct scan/ better iv access for long term


   Type Line:: PICC





01/07/22 13:24


Midline [Consult to PICC Line RN] [CONS] Urgent 


   Reason For Exam: unable to obtain iv access


   Type Line:: Midline





01/08/22 08:09


Consult to Physician [CONS] Routine 


   Comment: 


   Consulting Provider: CAYLA HARVEY


   Physician Instructions: 


   Reason For Exam: PNA





01/08/22 09:51


Speech Therapy Evaluation and Treat [CONS] Routine 


   Reason For Exam: possible asp pneum





01/10/22 10:13


Physical Therapy Evaluation and Treat [CONS] Routine 


   Comment: 


   Reason For Exam: deconditioning











Primary care physician: 


PRIMARY CARE MD








Hospitalization


Condition: Stable


Disposition: 06 HOME HEALTH CARE SERVICE





Exam





- Constitutional


Vitals: 


                                        











Temp Pulse Resp BP Pulse Ox


 


 98.7 F   72   18   153/82   100 


 


 01/15/22 08:11  01/15/22 10:56  01/15/22 10:56  01/15/22 08:11  01/15/22 10:56














Plan


Follow up with: 


PRIMARY CARE,MD [Primary Care Provider] - 7 Days

## 2023-01-02 NOTE — PROGRESS NOTE
Assessment and Plan





# Hypokalemia, Hyponatremia: unclear etiology of hypokalemia, but K 2.0 

improving to 3.3.  Continue to aggressively replete, will help hyponatremia as 

well per Adrogue equation.  Possible to have renal wasting in Fanconi like 

syndrome given proteinuria, but appears that patient may also be having diarrhea

and GI loss on repeated questioning.


- replete KCl IV 


- no Lasix for now, continue spironolactone for K-sparing property


- if edema not improving with spironolactone, recommend albumin + lasix prn only

to limit K loss


- if BP is stable, consider ACE/ARB which will help K loss as well, BP soft 

currently





# Anasarca, Proteinuria: potential nephrotic range proteinuria, check UP/C to 

quantify (still pending).  Cardiology evaluation appreciated, echo noted.  Renal

function WNL.





# CVA





# HTN: BP soft on admission, monitor











Subjective


Date of service: 01/01/22


Interval history: 


No major clinical changes, remains with edema





Objective





- Exam


Narrative Exam: 





Constitutional: no acute distress


Head: NC/AT


Neck: supple


Lungs: clear to auscultation


CV: RRR, no M/R/G


Abdomen: soft, non-tender, bowel sounds present


Back: nontender


Extremities: 2+ edema, pulses WNL


Skin: intact


Neuro: no focal deficits, alert and oriented x2





- Vital Signs


Vital signs: 


                               Vital Signs - 12hr











  01/01/22 01/01/22 01/01/22





  08:40 11:44 12:00


 


Temperature 98.6 F 98.4 F 


 


Pulse Rate 81  71


 


Respiratory 16 18 





Rate   


 


Blood Pressure 96/50 98/51 


 


O2 Sat by Pulse 100  





Oximetry   














  01/01/22





  13:19


 


Temperature 


 


Pulse Rate 


 


Respiratory 





Rate 


 


Blood Pressure 


 


O2 Sat by Pulse 95





Oximetry 














- Lab





                                 12/30/21 23:51





                                 01/01/22 11:15


                             Most recent lab results











Calcium  7.9 mg/dL (8.4-10.2)  L  01/01/22  11:15    














Medications & Allergies





- Medications


Allergies/Adverse Reactions: 


                                    Allergies





No Known Allergies Allergy (Verified 12/30/21 22:39)


   








Active Medications: 














Generic Name Dose Route Start Last Admin





  Trade Name Freq  PRN Reason Stop Dose Admin


 


Acetaminophen  650 mg  12/31/21 03:51  12/31/21 16:09





  Acetaminophen 325 Mg Tab  PO   650 mg





  Q4H PRN   Administration





  Pain MILD(1-3)/Fever >100.5/HA  


 


Albuterol  2.5 mg  12/31/21 03:51 





  Albuterol 2.5 Mg/3 Ml Nebu  IH  





  Q4HRT PRN  





  Shortness Of Breath  


 


Albuterol/Ipratropium  1 ampul  12/31/21 08:00  01/01/22 09:59





  Ipratropium/Albuterol Sulfate 3 Ml Ampul.Neb  IH   Not Given





  Q6HRT SHAY  


 


Famotidine  10 mg  12/31/21 10:00  01/01/22 10:02





  Famotidine 10 Mg Tab  PO   10 mg





  BID SHAY   Administration


 


Heparin Sodium (Porcine)  5,000 unit  12/31/21 10:00  01/01/22 10:01





  Heparin 5,000 Unit/1 Ml Vial  SUB-Q   5,000 unit





  Q12HR SHAY   Administration


 


Hydromorphone HCl  0.5 mg  12/31/21 03:51 





  Hydromorphone 1 Mg/1 Ml Inj  IV  





  Q3H PRN  





  Pain , Severe (7-10)  


 


Morphine Sulfate  2 mg  12/31/21 03:51 





  Morphine 2 Mg/1 Ml Inj  IV  





  Q4H PRN  





  Pain, Moderate (4-6)  


 


Ondansetron HCl  4 mg  12/31/21 03:51 





  Ondansetron 4 Mg/2 Ml Inj  IV  





  Q8H PRN  





  Nausea And Vomiting  


 


Sodium Chloride  10 ml  12/31/21 10:00  01/01/22 10:02





  Sodium Chloride 0.9% 10 Ml Flush Syringe  IV   10 ml





  BID SHAY   Administration


 


Sodium Chloride  10 ml  12/31/21 03:51 





  Sodium Chloride 0.9% 10 Ml Flush Syringe  IV  





  PRN PRN  





  LINE FLUSH  


 


Spironolactone  25 mg  12/31/21 16:00  01/01/22 10:02





  Spironolactone 25 Mg Tab  PO   25 mg





  QDAY SHYA   Administration
Assessment and Plan





# Hypokalemia, Hyponatremia: unclear etiology of hypokalemia, but K 2.0 

improving to 3.3.  No labs today, still pending.  Continue to aggressively 

replete, will help hyponatremia as well per Adrogue equation.  Possible to have 

renal wasting in Fanconi like syndrome given proteinuria, but appears that 

patient may also be having diarrhea and GI loss on repeated questioning.


- replete KCl IV 


- no Lasix for now, continue spironolactone for K-sparing property


- if edema not improving with spironolactone, recommend albumin + lasix prn only

to limit K loss with KCl repletion prn


- if BP is stable, consider ACE/ARB which will help K loss as well, BP soft 

currently





# Anasarca, Proteinuria: potential nephrotic range proteinuria, check UP/C to 

quantify (still pending).  Cardiology evaluation appreciated, echo noted.  Renal

function WNL.





# CVA





# HTN: BP soft on admission, monitor











Subjective


Date of service: 01/02/22


Interval history: 


No major clinical changes, remains with edema





Objective





- Exam


Narrative Exam: 





Constitutional: no acute distress


Head: NC/AT


Neck: supple


Lungs: clear to auscultation


CV: RRR, no M/R/G


Abdomen: soft, non-tender, bowel sounds present


Back: nontender


Extremities: 2+ edema, pulses WNL


Skin: intact


Neuro: no focal deficits, alert and oriented x2





- Vital Signs


Vital signs: 


                               Vital Signs - 12hr











  01/02/22





  13:05


 


O2 Sat by Pulse 95





Oximetry 














- Lab





                                 01/01/22 11:15





                                 01/01/22 11:15


                             Most recent lab results











Calcium  7.9 mg/dL (8.4-10.2)  L  01/01/22  11:15    


 


Magnesium  1.90 mg/dL (1.7-2.3)   01/01/22  11:15    














Medications & Allergies





- Medications


Allergies/Adverse Reactions: 


                                    Allergies





No Known Allergies Allergy (Verified 12/30/21 22:39)


   








Active Medications: 














Generic Name Dose Route Start Last Admin





  Trade Name Freq  PRN Reason Stop Dose Admin


 


Acetaminophen  650 mg  12/31/21 03:51  12/31/21 16:09





  Acetaminophen 325 Mg Tab  PO   650 mg





  Q4H PRN   Administration





  Pain MILD(1-3)/Fever >100.5/HA  


 


Albuterol  2.5 mg  12/31/21 03:51 





  Albuterol 2.5 Mg/3 Ml Nebu  IH  





  Q4HRT PRN  





  Shortness Of Breath  


 


Albuterol/Ipratropium  1 ampul  12/31/21 08:00  01/02/22 15:11





  Ipratropium/Albuterol Sulfate 3 Ml Ampul.Neb  IH   1 ampul





  Q6HRT SHAY   Administration


 


Famotidine  10 mg  12/31/21 10:00  01/02/22 13:10





  Famotidine 10 Mg Tab  PO   10 mg





  BID SHAY   Administration


 


Heparin Sodium (Porcine)  5,000 unit  12/31/21 10:00  01/02/22 13:09





  Heparin 5,000 Unit/1 Ml Vial  SUB-Q   5,000 unit





  Q12HR SHAY   Administration


 


Hydromorphone HCl  0.5 mg  12/31/21 03:51 





  Hydromorphone 1 Mg/1 Ml Inj  IV  





  Q3H PRN  





  Pain , Severe (7-10)  


 


Morphine Sulfate  2 mg  12/31/21 03:51 





  Morphine 2 Mg/1 Ml Inj  IV  





  Q4H PRN  





  Pain, Moderate (4-6)  


 


Ondansetron HCl  4 mg  12/31/21 03:51 





  Ondansetron 4 Mg/2 Ml Inj  IV  





  Q8H PRN  





  Nausea And Vomiting  


 


Potassium Chloride  40 meq  01/01/22 17:00  01/02/22 13:09





  Potassium Chloride Er 20 Meq Tab  PO   40 meq





  QDAY SHAY   Administration


 


Sodium Chloride  10 ml  12/31/21 10:00  01/02/22 13:10





  Sodium Chloride 0.9% 10 Ml Flush Syringe  IV   10 ml





  BID SHAY   Administration


 


Sodium Chloride  10 ml  12/31/21 03:51 





  Sodium Chloride 0.9% 10 Ml Flush Syringe  IV  





  PRN PRN  





  LINE FLUSH  


 


Spironolactone  25 mg  12/31/21 16:00  01/02/22 13:09





  Spironolactone 25 Mg Tab  PO   25 mg





  QDAY SHAY   Administration
Assessment and Plan





- Patient Problems


(1) Anasarca


Current Visit: Yes   Status: Acute   





(2) CVA (cerebral vascular accident)


Current Visit: Yes   Status: Acute   





(3) Hypertension


Current Visit: Yes   Status: Acute   





(4) Hypokalemia


Current Visit: Yes   Status: Acute   





(5) Hyponatremia


Current Visit: Yes   Status: Acute   





Subjective


Date of service: 01/01/22


Interval history: 





NO C/O





Objective


                                   Vital Signs











  Temp Pulse Pulse Resp Resp BP Pulse Ox


 


 01/01/22 08:40  98.6 F  81   16   96/50  100


 


 01/01/22 03:48  97.5 F L  85   18   116/70  99


 


 01/01/22 02:15        95


 


 12/31/21 23:34  97.4 F L    18   89/55 


 


 12/31/21 20:48   59 L     


 


 12/31/21 19:38  98.0 F    18   98/56 


 


 12/31/21 16:00        95


 


 12/31/21 15:43  98.7 F  58 L   18   108/62  90


 


 12/31/21 14:25    61   18  


 


 12/31/21 14:00   58 L     


 


 12/31/21 12:59        94














- Physical Examination


General: No Apparent Distress


HEENT: Positive: Normocephaly


Neck: Positive: JVD/HJR


Cardiac: Positive: Regular Rhythm


Lungs: Positive: clear to auscultation, Decreased Breath Sounds


Abdomen: Positive: Soft, Other (NO ASCITES OR TENDER)


Extremities: Present: +1 Edema





- Labs and Meds


                          Comprehensive Metabolic Panel











  01/01/22 Range/Units





  11:15 


 


Sodium  129 L  (137-145)  mmol/L


 


Potassium  3.3 L D  (3.6-5.0)  mmol/L


 


Chloride  93.5 L  ()  mmol/L


 


Carbon Dioxide  22  (22-30)  mmol/L


 


BUN  15  (9-20)  mg/dL


 


Creatinine  1.0  (0.8-1.3)  mg/dL


 


Glucose  81  ()  mg/dL


 


Calcium  7.9 L  (8.4-10.2)  mg/dL
Assessment and Plan





- Patient Problems


(1) Anasarca


Current Visit: Yes   Status: Acute   





(2) CVA (cerebral vascular accident)


Current Visit: Yes   Status: Acute   





(3) Hypertension


Current Visit: Yes   Status: Acute   





(4) Hypokalemia


Current Visit: Yes   Status: Acute   





(5) Hyponatremia


Current Visit: Yes   Status: Acute   





Subjective


Date of service: 01/02/22


Interval history: 





NO C/O





Objective


                                   Vital Signs











  Temp Pulse Pulse Resp Resp BP Pulse Ox


 


 01/02/22 03:11  97.8 F  80   18   111/63  87


 


 01/02/22 02:00        95


 


 01/01/22 23:25  97.4 F L    18   90/32 


 


 01/01/22 19:22  97.3 F L  76   18   91/49  52 L


 


 01/01/22 18:35    67   18   96


 


 01/01/22 15:42  98.2 F  77   18   103/48  90


 


 01/01/22 13:19        95


 


 01/01/22 12:00   71     














- Physical Examination


General: No Apparent Distress


HEENT: Positive: Normocephaly


Neck: Positive: JVD/HJR


Cardiac: Positive: Reg Rate and Rhythm


Lungs: Positive: Decreased Breath Sounds


Abdomen: Positive: Soft, Other (NO ASCITES OR TENDER)


Extremities: Present: +1 Edema





- Labs and Meds


                                       CBC











  01/01/22 Range/Units





  11:15 


 


WBC  5.8  (4.5-11.0)  K/mm3


 


RBC  4.05  (3.65-5.03)  M/mm3


 


Hgb  11.2 L  (11.8-15.2)  gm/dl


 


Hct  35.4 L  (35.5-45.6)  %


 


Plt Count  231  (140-440)  K/mm3


 


Lymph # (Auto)  Np  


 


Mono # (Auto)  Np  


 


Eos # (Auto)  Np  


 


Baso # (Auto)  Np
Assessment and Plan





- Patient Problems


(1) Edema


Current Visit: Yes   Status: Acute   


Plan to address problem: 


Noncardiogenic edema associated with marked electrolyte derangements and 

proteinuria.  


-- defer to primary team for further Work-up 








(2) Nonsustained ventricular tachycardia


Current Visit: Yes   Status: Acute   


Plan to address problem: 


- Tele showed frequent PACs and short runs of SVT in the past 24 hours (no 

change in the past 48 hours)


- c/w  low-dose beta-blockers as tolerated.





Thanks for consult. We will sign off. Please call us back if new issues or ques

tions.





Subjective


Date of service: 01/08/22


Interval history: 





No active cardiac events or complaints





Objective


                                   Vital Signs











  Temp Pulse Pulse Resp Resp BP BP


 


 01/08/22 08:50       


 


 01/08/22 08:00    102 H   16  


 


 01/08/22 07:58  98.0 F  99 H   16   123/56 


 


 01/08/22 03:00  97.3 F L  103 H   18    113/63


 


 01/08/22 00:00  97.9 F  101 H   18    106/45


 


 01/07/22 23:35     22   


 


 01/07/22 21:40       


 


 01/07/22 21:35    105 H   15  


 


 01/07/22 20:45   102 H     


 


 01/07/22 19:53  97.8 F  102 H   18    93/55


 


 01/07/22 15:49  97.9 F    16   105/50 


 


 01/07/22 15:37       


 


 01/07/22 12:06  97.9 F  18 L   18    116/56














  Pulse Ox


 


 01/08/22 08:50  95


 


 01/08/22 08:00 


 


 01/08/22 07:58  97


 


 01/08/22 03:00  100


 


 01/08/22 00:00  96


 


 01/07/22 23:35  98


 


 01/07/22 21:40  95


 


 01/07/22 21:35 


 


 01/07/22 20:45 


 


 01/07/22 19:53  90


 


 01/07/22 15:49 


 


 01/07/22 15:37  97


 


 01/07/22 12:06  92














- Physical Examination


General: No Apparent Distress, Cachectic, Other (Patient is frail, elderly, 

poorly communicative)


HEENT: Positive: Normocephaly


Neck: Positive: JVD/HJR


Neuro: Positive: Weakness (Generalized lethargy)


Abdomen: Positive: Soft, Other (NO ASCITES OR TENDER)


Skin: Positive: Clear


Extremities: Present: +1 Edema





- Labs and Meds


                          Comprehensive Metabolic Panel











  01/07/22 01/08/22 Range/Units





  10:07 08:28 


 


Creatinine  0.6 L  0.5 L  (0.8-1.3)  mg/dL
Assessment and Plan





- Patient Problems


(1) Edema


Current Visit: Yes   Status: Acute   


Plan to address problem: 


Noncardiogenic edema associated with marked electrolyte derangements and 

proteinuria.  Work-up is ongoing for nephrotic syndrome.








(2) Nonsustained ventricular tachycardia


Current Visit: Yes   Status: Acute   


Plan to address problem: 


We will manage asymptomatic ventricular ectopy by optimizing electrolyte status,

and a trial of low-dose beta-blockers as tolerated.








Subjective


Date of service: 01/04/22


Interval history: 





The patient is comfortable in no acute distress, no new cardiac complaints.  On 

telemetry monitor, there is a normal sinus rhythm at 98.





Objective


                                   Vital Signs











  Temp Pulse Pulse Resp Resp BP Pulse Ox


 


 01/04/22 10:37   99 H     


 


 01/04/22 09:36        100


 


 01/04/22 09:32    93 H   18  


 


 01/04/22 09:31        100


 


 01/04/22 08:00  98.0 F  99 H   18   124/64  94


 


 01/04/22 06:11        95


 


 01/04/22 04:17  97.6 F  76   18   112/59  98


 


 01/04/22 03:45    94 H   18  


 


 01/03/22 23:25  98.8 F  93 H   18   103/59  95


 


 01/03/22 22:29   94 H     


 


 01/03/22 21:21       93/57 


 


 01/03/22 21:05    97 H   18  


 


 01/03/22 21:00        95


 


 01/03/22 19:18  98.8 F  94 H   18   93/57  89


 


 01/03/22 16:50  98.6 F  97 H   20   108/62  100


 


 01/03/22 13:41    97 H   18  














- Physical Examination


General: No Apparent Distress, Cachectic, Other (Patient is frail, elderly, 

poorly communicative)


HEENT: Positive: Normocephaly


Neck: Positive: JVD/HJR


Cardiac: Positive: Reg Rate and Rhythm


Lungs: Positive: Decreased Breath Sounds


Neuro: Positive: Weakness (Generalized lethargy)


Abdomen: Positive: Soft, Other (NO ASCITES OR TENDER)


Skin: Positive: Clear


Extremities: Present: +1 Edema





- Labs and Meds


                                   Coagulation











  01/03/22 Range/Units





  20:38 


 


PT  16.5 H  (12.2-14.9)  Sec.


 


INR  1.20 H  (0.87-1.13)  


 


APTT  67.8 H*  (24.2-36.6)  Sec.








                                       CBC











  01/03/22 Range/Units





  20:38 


 


Hgb  9.0 L  (11.8-15.2)  gm/dl


 


Hct  28.8 L D  (35.5-45.6)  %


 


Plt Count  247  (140-440)  K/mm3








                          Comprehensive Metabolic Panel











  01/04/22 Range/Units





  04:48 


 


Sodium  140  D  (137-145)  mmol/L


 


Potassium  3.4 L  (3.6-5.0)  mmol/L


 


Chloride  99.6  ()  mmol/L


 


Carbon Dioxide  28  D  (22-30)  mmol/L


 


BUN  11  (9-20)  mg/dL


 


Creatinine  0.7 L  (0.8-1.3)  mg/dL


 


Glucose  84  ()  mg/dL


 


Calcium  7.9 L  (8.4-10.2)  mg/dL
Assessment and Plan





- Patient Problems


(1) Edema


Current Visit: Yes   Status: Acute   


Plan to address problem: 


Noncardiogenic edema associated with marked electrolyte derangements and 

proteinuria.  Work-up is ongoing for nephrotic syndrome.








(2) Nonsustained ventricular tachycardia


Current Visit: Yes   Status: Acute   


Plan to address problem: 


We will manage asymptomatic ventricular ectopy by optimizing electrolyte status,

and a trial of low-dose beta-blockers as tolerated.








Subjective


Date of service: 01/05/22


Interval history: 





Patient is comfortable, no cardiac complaints reported.  Stable sinus rhythm on 

telemetry monitor.





Objective


                                   Vital Signs











  Temp Pulse Pulse Resp Resp BP BP


 


 01/05/22 10:08       110/64 


 


 01/05/22 10:07   82     


 


 01/05/22 09:18    86   22  


 


 01/05/22 09:17       


 


 01/05/22 09:15  97.9 F    18    110/64


 


 01/05/22 06:00  97.9 F  78   18    110/60


 


 01/05/22 01:00  98.5 F  87   18    111/59


 


 01/04/22 23:25    64   20  


 


 01/04/22 23:22       


 


 01/04/22 22:00  97.7 F  69   18    85/56


 


 01/04/22 19:56  97.9 F    18   86/56 


 


 01/04/22 19:52  97.9 F  67   18   98/49 


 


 01/04/22 18:00     24   


 


 01/04/22 16:31  98.4 F  86   18   101/46 


 


 01/04/22 15:38    60   20  


 


 01/04/22 14:00   86     


 


 01/04/22 11:34  98.4 F  79   18   94/56 


 


 01/04/22 10:37   99 H     














  Pulse Ox


 


 01/05/22 10:08 


 


 01/05/22 10:07 


 


 01/05/22 09:18 


 


 01/05/22 09:17  97


 


 01/05/22 09:15  96


 


 01/05/22 06:00  95


 


 01/05/22 01:00  90


 


 01/04/22 23:25 


 


 01/04/22 23:22  100


 


 01/04/22 22:00  95


 


 01/04/22 19:56 


 


 01/04/22 19:52  74 L


 


 01/04/22 18:00  95


 


 01/04/22 16:31  87


 


 01/04/22 15:38 


 


 01/04/22 14:00 


 


 01/04/22 11:34  96


 


 01/04/22 10:37 














- Physical Examination


General: No Apparent Distress, Cachectic, Other (Patient is frail, elderly, 

poorly communicative)


HEENT: Positive: Normocephaly


Neck: Positive: JVD/HJR


Cardiac: Positive: Reg Rate and Rhythm


Lungs: Positive: Decreased Breath Sounds


Neuro: Positive: Weakness (Generalized lethargy)


Abdomen: Positive: Soft, Other (NO ASCITES OR TENDER)


Skin: Positive: Clear


Extremities: Present: +1 Edema





- Labs and Meds


                                   Coagulation











  01/04/22 01/04/22 Range/Units





  10:32 13:49 


 


PT  15.7 H   (12.2-14.9)  Sec.


 


INR  1.13   (0.87-1.13)  


 


APTT  135.0 H*  171.6 H*  (24.2-36.6)  Sec.








                                       CBC











  01/04/22 01/05/22 Range/Units





  13:35 02:23 


 


WBC  9.0   (4.5-11.0)  K/mm3


 


RBC  3.38 L   (3.65-5.03)  M/mm3


 


Hgb  9.5 L  9.4 L  (11.8-15.2)  gm/dl


 


Hct  28.8 L  29.3 L  (35.5-45.6)  %


 


Plt Count  244  261  (140-440)  K/mm3








                          Comprehensive Metabolic Panel











  01/04/22 Range/Units





  13:35 


 


Creatinine  0.6 L  (0.8-1.3)  mg/dL
Assessment and Plan





- Patient Problems


(1) Edema


Current Visit: Yes   Status: Acute   


Plan to address problem: 


Noncardiogenic edema associated with marked electrolyte derangements and 

proteinuria.  Work-up is ongoing for nephrotic syndrome.








(2) Nonsustained ventricular tachycardia


Current Visit: Yes   Status: Acute   


Plan to address problem: 


We will manage asymptomatic ventricular ectopy by optimizing electrolyte status,

and a trial of low-dose beta-blockers as tolerated.








Subjective


Date of service: 01/06/22


Interval history: 





Patient is comfortable, no acute distress, no cardiac complaints reported.





Objective


                                   Vital Signs











  Temp Pulse Pulse Resp Resp BP BP


 


 01/06/22 08:34     24   


 


 01/06/22 08:31  98.0 F  121 H   18   107/58 


 


 01/06/22 08:24    122 H   20  


 


 01/06/22 04:00  98.6 F  77   17    108/72


 


 01/05/22 23:20  97.7 F  97 H   17   112/60 


 


 01/05/22 22:47     24   


 


 01/05/22 21:11       


 


 01/05/22 21:10    88   20  


 


 01/05/22 19:37  97.4 F L    19   121/69 


 


 01/05/22 16:08  98.0 F  54 L   18    115/68


 


 01/05/22 14:00   54 L     


 


 01/05/22 12:02  98.6 F  76   18    110/64














  Pulse Ox


 


 01/06/22 08:34  95


 


 01/06/22 08:31  99


 


 01/06/22 08:24  97


 


 01/06/22 04:00  100


 


 01/05/22 23:20  97


 


 01/05/22 22:47  95


 


 01/05/22 21:11  98


 


 01/05/22 21:10 


 


 01/05/22 19:37 


 


 01/05/22 16:08  90


 


 01/05/22 14:00 


 


 01/05/22 12:02  96














- Physical Examination


General: No Apparent Distress, Cachectic, Other (Patient is frail, elderly, 

poorly communicative)


HEENT: Positive: Normocephaly


Neck: Positive: JVD/HJR


Cardiac: Positive: Reg Rate and Rhythm


Lungs: Positive: Decreased Breath Sounds


Neuro: Positive: Weakness (Generalized lethargy)


Abdomen: Positive: Soft, Other (NO ASCITES OR TENDER)


Skin: Positive: Clear


Extremities: Present: +1 Edema





- Labs and Meds


                                   Coagulation











  01/05/22 Range/Units





  14:48 


 


PT  14.5  (12.2-14.9)  Sec.


 


INR  1.02  (0.87-1.13)  


 


APTT  33.1  (24.2-36.6)  Sec.








                                       CBC











  01/05/22 Range/Units





  Unknown 


 


WBC  7.3  (4.5-11.0)  K/mm3


 


RBC  3.99  (3.65-5.03)  M/mm3


 


Hgb  11.0 L  (11.8-15.2)  gm/dl


 


Hct  34.7 L  (35.5-45.6)  %


 


Plt Count  262  (140-440)  K/mm3








                          Comprehensive Metabolic Panel











  01/05/22 01/05/22 Range/Units





  14:48 14:48 


 


Sodium  136 L   (137-145)  mmol/L


 


Potassium  4.9  D   (3.6-5.0)  mmol/L


 


Chloride  97.2 L   ()  mmol/L


 


Carbon Dioxide  29   (22-30)  mmol/L


 


BUN  9   (9-20)  mg/dL


 


Creatinine  0.6 L  0.6 L  (0.8-1.3)  mg/dL


 


Glucose  96   ()  mg/dL


 


Calcium  8.5   (8.4-10.2)  mg/dL
Assessment and Plan





- Patient Problems


(1) Edema


Current Visit: Yes   Status: Acute   


Plan to address problem: 


Noncardiogenic edema associated with marked electrolyte derangements and 

proteinuria.  Work-up is ongoing for nephrotic syndrome.








(2) Nonsustained ventricular tachycardia


Current Visit: Yes   Status: Acute   


Plan to address problem: 


We will manage asymptomatic ventricular ectopy by optimizing electrolyte status,

and a trial of low-dose beta-blockers as tolerated.








Subjective


Date of service: 01/07/22


Interval history: 





Patient is comfortable, no cardiac complaints reported.





Objective


                                   Vital Signs











  Temp Pulse Pulse Resp Resp BP BP


 


 01/07/22 12:06  97.9 F  18 L   18    116/56


 


 01/07/22 10:00       


 


 01/07/22 09:00  98.0 F  101 H   18   128/56 


 


 01/07/22 08:00     24   


 


 01/07/22 04:47  97.5 F L    18   115/56 


 


 01/06/22 23:41  97.6 F  89   18   108/65 


 


 01/06/22 22:00     24   


 


 01/06/22 20:58  97.9 F    18   103/59 


 


 01/06/22 20:30       


 


 01/06/22 20:28    60   18  


 


 01/06/22 16:49  98.0 F  68   18    106/52


 


 01/06/22 16:41  98.0 F  99 H   18    108/58


 


 01/06/22 13:09    95 H   18  














  Pulse Ox


 


 01/07/22 12:06  92


 


 01/07/22 10:00  97


 


 01/07/22 09:00  96


 


 01/07/22 08:00  96


 


 01/07/22 04:47 


 


 01/06/22 23:41  99


 


 01/06/22 22:00  96


 


 01/06/22 20:58  90


 


 01/06/22 20:30  100


 


 01/06/22 20:28 


 


 01/06/22 16:49  95


 


 01/06/22 16:41  96


 


 01/06/22 13:09 














- Physical Examination


General: No Apparent Distress, Cachectic, Other (Patient is frail, elderly, 

poorly communicative)


HEENT: Positive: Normocephaly


Neck: Positive: JVD/HJR


Cardiac: Positive: Reg Rate and Rhythm


Lungs: Positive: Decreased Breath Sounds


Neuro: Positive: Weakness (Generalized lethargy)


Abdomen: Positive: Soft, Other (NO ASCITES OR TENDER)


Skin: Positive: Clear


Extremities: Present: +1 Edema





- Labs and Meds


                                       CBC











  01/06/22 01/07/22 Range/Units





  13:22 10:07 


 


WBC  19.9 H  16.7 H  (4.5-11.0)  K/mm3


 


RBC  3.90  3.94  (3.65-5.03)  M/mm3


 


Hgb  10.8 L  10.7 L  (11.8-15.2)  gm/dl


 


Hct  34.6 L  34.7 L  (35.5-45.6)  %


 


Plt Count  239  214  (140-440)  K/mm3








                          Comprehensive Metabolic Panel











  01/07/22 Range/Units





  10:07 


 


Creatinine  0.6 L  (0.8-1.3)  mg/dL
Assessment and Plan





- Patient Problems


(1) Edema


Current Visit: Yes   Status: Acute   


Plan to address problem: 


Noncardiogenic edema associated with marked electrolyte derangements and 

proteinuria.  Work-up is ongoing for nephrotic syndrome.








(2) Nonsustained ventricular tachycardia


Current Visit: Yes   Status: Acute   


Plan to address problem: 


We will manage asymptomatic ventricular ectopy by optimizing electrolyte status,

and a trial of low-dose beta-blockers as tolerated.








Subjective


Date of service: 01/10/22


Interval history: 





Patient is comfortable, no cardiac complaints, no acute distress.  On telemetry 

monitor he has a sinus rhythm at 72.





Objective


                                   Vital Signs











  Temp Pulse Pulse Resp Resp BP BP


 


 01/10/22 09:46    72   20  


 


 01/10/22 08:54  98.3 F  78   20   129/80 


 


 01/10/22 08:00       


 


 01/10/22 05:30  98.3 F  78   18   150/75 


 


 01/10/22 01:45  98.5 F    18   133/69 


 


 01/09/22 23:00       


 


 01/09/22 22:06   73     146/78 


 


 01/09/22 21:00  97.5 F L  73   18    146/78


 


 01/09/22 20:56       


 


 01/09/22 20:54    67   20  


 


 01/09/22 16:36  97.9 F  82   16    126/53














  Pulse Ox


 


 01/10/22 09:46  98


 


 01/10/22 08:54  100


 


 01/10/22 08:00  98


 


 01/10/22 05:30  100


 


 01/10/22 01:45 


 


 01/09/22 23:00  92


 


 01/09/22 22:06 


 


 01/09/22 21:00  92


 


 01/09/22 20:56  99


 


 01/09/22 20:54 


 


 01/09/22 16:36  92














- Physical Examination


General: No Apparent Distress, Cachectic, Other (Patient is frail, elderly, 

poorly communicative)


HEENT: Positive: Normocephaly


Neck: Positive: neck supple, JVD/HJR


Cardiac: Positive: Reg Rate and Rhythm


Lungs: Positive: Decreased Breath Sounds


Neuro: Positive: Weakness (Generalized lethargy)


Abdomen: Positive: Soft, Other (NO ASCITES OR TENDER)


Skin: Positive: Clear


Extremities: Present: +1 Edema





- Labs and Meds


                                 Cardiac Enzymes











  01/09/22 Range/Units





  13:11 


 


AST  23  (5-40)  units/L








                          Comprehensive Metabolic Panel











  01/09/22 Range/Units





  13:11 


 


Sodium  144  D  (137-145)  mmol/L


 


Potassium  5.4 H  (3.6-5.0)  mmol/L


 


Chloride  103.9  ()  mmol/L


 


Carbon Dioxide  22  D  (22-30)  mmol/L


 


BUN  13  (9-20)  mg/dL


 


Creatinine  0.8  D  (0.8-1.3)  mg/dL


 


Glucose  52 L  ()  mg/dL


 


Calcium  8.6  (8.4-10.2)  mg/dL


 


AST  23  (5-40)  units/L


 


ALT  15  (7-56)  units/L


 


Alkaline Phosphatase  129  ()  units/L


 


Total Protein  5.7 L  (6.3-8.2)  g/dL


 


Albumin  2.5 L  (3.9-5)  g/dL
Assessment and Plan





- Patient Problems


(1) Edema


Current Visit: Yes   Status: Acute   


Plan to address problem: 


Noncardiogenic edema associated with marked electrolyte derangements and 

proteinuria.  Work-up is ongoing for nephrotic syndrome.








(2) Nonsustained ventricular tachycardia


Current Visit: Yes   Status: Acute   


Plan to address problem: 


We will manage asymptomatic ventricular ectopy by optimizing electrolyte status,

and a trial of low-dose beta-blockers as tolerated.








Subjective


Date of service: 01/11/22


Interval history: 





Patient is comfortable, no cardiac complaints, no cardiac events reported.





Objective


                                   Vital Signs











  Temp Pulse Pulse Resp Resp BP BP


 


 01/11/22 10:12  98.1 F  51 L   20   120/72 


 


 01/11/22 09:36       


 


 01/11/22 09:34    72   16  


 


 01/11/22 08:28       


 


 01/11/22 04:05  98.8 F  71   18   125/42 


 


 01/11/22 03:18       


 


 01/10/22 23:59  98.8 F  79   18    114/59


 


 01/10/22 22:23   86     111/59 


 


 01/10/22 22:04       


 


 01/10/22 21:57    81   16  


 


 01/10/22 19:35  98.0 F  86   20   111/59 


 


 01/10/22 17:15  98.3 F  84   20   137/74 


 


 01/10/22 15:27    89   24  














  Pulse Ox


 


 01/11/22 10:12  93


 


 01/11/22 09:36  99


 


 01/11/22 09:34 


 


 01/11/22 08:28  97


 


 01/11/22 04:05  100


 


 01/11/22 03:18  96


 


 01/10/22 23:59  100


 


 01/10/22 22:23 


 


 01/10/22 22:04  99


 


 01/10/22 21:57 


 


 01/10/22 19:35  94


 


 01/10/22 17:15  82 L


 


 01/10/22 15:27 














- Physical Examination


General: No Apparent Distress, Cachectic, Other (Patient is frail, elderly, 

poorly communicative)


HEENT: Positive: Normocephaly


Neck: Positive: neck supple, JVD/HJR


Cardiac: Positive: Reg Rate and Rhythm


Lungs: Positive: Decreased Breath Sounds


Neuro: Positive: Weakness (Generalized lethargy)


Abdomen: Positive: Soft, Other (NO ASCITES OR TENDER)


Skin: Positive: Clear


Extremities: Present: +1 Edema





- Labs and Meds


                                       CBC











  01/10/22 Range/Units





  15:06 


 


WBC  8.3  (4.5-11.0)  K/mm3


 


RBC  3.17 L  (3.65-5.03)  M/mm3


 


Hgb  8.6 L  (11.8-15.2)  gm/dl


 


Hct  27.4 L  (35.5-45.6)  %


 


Plt Count  241  (140-440)  K/mm3


 


Lymph # (Auto)  1.7  (1.2-5.4)  K/mm3


 


Mono # (Auto)  1.0 H  (0.0-0.8)  K/mm3


 


Eos # (Auto)  0.0  (0.0-0.4)  K/mm3


 


Baso # (Auto)  0.0  (0.0-0.1)  K/mm3








                          Comprehensive Metabolic Panel











  01/10/22 Range/Units





  15:06 


 


Sodium  147 H  (137-145)  mmol/L


 


Potassium  3.9  D  (3.6-5.0)  mmol/L


 


Chloride  107.0  ()  mmol/L


 


Carbon Dioxide  27  (22-30)  mmol/L


 


BUN  10  (9-20)  mg/dL


 


Creatinine  0.7 L  (0.8-1.3)  mg/dL


 


Glucose  107 H  ()  mg/dL


 


Calcium  8.5  (8.4-10.2)  mg/dL
Assessment and Plan





- Patient Problems


(1) Edema


Current Visit: Yes   Status: Acute   


Plan to address problem: 


Noncardiogenic edema associated with marked electrolyte derangements and 

proteinuria.  Work-up is ongoing for nephrotic syndrome.








(2) Nonsustained ventricular tachycardia


Current Visit: Yes   Status: Acute   


Plan to address problem: 


We will manage asymptomatic ventricular ectopy by optimizing electrolyte status,

and a trial of low-dose beta-blockers as tolerated.








Subjective


Date of service: 01/12/22


Interval history: 





Patient is comfortable, no cardiac complaints, no cardiac events reported.  

Physical therapy is ongoing.





Objective


                                   Vital Signs











  Temp Pulse Pulse Resp Resp BP Pulse Ox


 


 01/12/22 16:11        97


 


 01/12/22 14:26   87     


 


 01/12/22 14:21   86     


 


 01/12/22 12:30  97.3 F L  71   20   130/46  100


 


 01/12/22 08:27  98.0 F  72   16   124/43  100


 


 01/12/22 03:37  97.8 F  77   22   139/64  93


 


 01/12/22 03:00   77      97


 


 01/11/22 23:23  97.3 F L  83   20   119/63  100


 


 01/11/22 21:39    77   20  


 


 01/11/22 21:38        100


 


 01/11/22 21:30   78     122/52 


 


 01/11/22 19:19  97.3 F L  78   16   122/52  100














- Physical Examination


General: No Apparent Distress, Cachectic, Other (Patient is frail, elderly, 

poorly communicative)


HEENT: Positive: Normocephaly


Neck: Positive: neck supple, JVD/HJR


Cardiac: Positive: Reg Rate and Rhythm


Lungs: Positive: Decreased Breath Sounds


Neuro: Positive: Weakness (Generalized lethargy)


Abdomen: Positive: Soft, Other (NO ASCITES OR TENDER)


Skin: Positive: Clear


Extremities: Present: +1 Edema





- Labs and Meds


                                       CBC











  01/12/22 Range/Units





  07:53 


 


Hgb  9.1 L  (11.8-15.2)  gm/dl


 


Hct  29.1 L  (35.5-45.6)  %


 


Plt Count  210  (140-440)  K/mm3
Assessment and Plan





- Patient Problems


(1) Edema


Current Visit: Yes   Status: Acute   


Plan to address problem: 


Noncardiogenic edema associated with marked electrolyte derangements and 

proteinuria.  Work-up is ongoing for nephrotic syndrome.








(2) Nonsustained ventricular tachycardia


Current Visit: Yes   Status: Acute   


Plan to address problem: 


We will manage asymptomatic ventricular ectopy by optimizing electrolyte status,

and a trial of low-dose beta-blockers as tolerated.








Subjective


Date of service: 01/13/22


Interval history: 





Patient is comfortable no acute distress, no cardiac events reported.





Objective


                                   Vital Signs











  Temp Pulse Pulse Resp Resp BP BP


 


 01/13/22 11:34       


 


 01/13/22 11:16  97.7 F  72   20   129/60 


 


 01/13/22 09:00       


 


 01/13/22 08:38  98.2 F  63   23   129/49 


 


 01/13/22 08:00    73   18  


 


 01/13/22 03:38  97.9 F  60   18   117/54 


 


 01/13/22 03:00       


 


 01/12/22 23:44  98.0 F  72   18    133/65


 


 01/12/22 21:10   70     118/35 


 


 01/12/22 20:00    82   25 H  


 


 01/12/22 19:38  98.4 F  70   19   118/35 


 


 01/12/22 16:11       


 


 01/12/22 15:56  98.6 F  69   19   108/57 


 


 01/12/22 15:00       


 


 01/12/22 14:26   87     


 


 01/12/22 14:21   86     














  Pulse Ox


 


 01/13/22 11:34  99


 


 01/13/22 11:16  100


 


 01/13/22 09:00  99


 


 01/13/22 08:38  98


 


 01/13/22 08:00 


 


 01/13/22 03:38  100


 


 01/13/22 03:00  100


 


 01/12/22 23:44  100


 


 01/12/22 21:10 


 


 01/12/22 20:00 


 


 01/12/22 19:38  100


 


 01/12/22 16:11  97


 


 01/12/22 15:56  92


 


 01/12/22 15:00  97


 


 01/12/22 14:26 


 


 01/12/22 14:21 














- Physical Examination


General: No Apparent Distress, Cachectic, Other (Patient is frail, elderly, 

poorly communicative)


HEENT: Positive: Normocephaly


Neck: Positive: neck supple, JVD/HJR


Cardiac: Positive: Reg Rate and Rhythm


Lungs: Positive: Decreased Breath Sounds


Neuro: Positive: Weakness (Generalized lethargy)


Abdomen: Positive: Soft, Other (NO ASCITES OR TENDER)


Skin: Positive: Clear


Extremities: Present: +1 Edema
Assessment and Plan





- Patient Problems


(1) Edema


Current Visit: Yes   Status: Acute   


Plan to address problem: 


Noncardiogenic edema associated with marked electrolyte derangements and 

proteinuria.  Work-up is ongoing for nephrotic syndrome.








(2) Nonsustained ventricular tachycardia


Current Visit: Yes   Status: Acute   


Plan to address problem: 


We will manage asymptomatic ventricular ectopy by optimizing electrolyte status,

and a trial of low-dose beta-blockers as tolerated.








Subjective


Date of service: 01/14/22


Interval history: 





Patient is comfortable, no chest pain, no shortness of breath, no new cardiac 

events reported.





Objective


                                   Vital Signs











  Temp Pulse Pulse Pulse Resp Resp BP


 


 01/14/22 11:00     71  18  


 


 01/14/22 09:21  98.3 F  71    20   133/67


 


 01/14/22 09:19    63    18 


 


 01/14/22 08:35   71     


 


 01/14/22 03:43  98.3 F  69    18   141/69


 


 01/13/22 23:17  96.9 F L  75    18   132/74


 


 01/13/22 23:00       


 


 01/13/22 22:55       


 


 01/13/22 21:52   75      140/76


 


 01/13/22 21:25    76    18 


 


 01/13/22 19:46  97.6 F  75    18   140/76


 


 01/13/22 15:46  98.3 F  76    20   118/53


 


 01/13/22 14:00    74    18 














  Pulse Ox


 


 01/14/22 11:00  95


 


 01/14/22 09:21  98


 


 01/14/22 09:19 


 


 01/14/22 08:35 


 


 01/14/22 03:43  96


 


 01/13/22 23:17  94


 


 01/13/22 23:00  94


 


 01/13/22 22:55  96


 


 01/13/22 21:52 


 


 01/13/22 21:25 


 


 01/13/22 19:46  99


 


 01/13/22 15:46  96


 


 01/13/22 14:00 














- Physical Examination


General: No Apparent Distress, Cachectic, Other (Patient is frail, elderly, 

poorly communicative)


HEENT: Positive: Normocephaly


Neck: Positive: neck supple, JVD/HJR


Cardiac: Positive: Reg Rate and Rhythm


Lungs: Positive: Decreased Breath Sounds


Neuro: Positive: Weakness (Generalized lethargy)


Abdomen: Positive: Soft, Other (NO ASCITES OR TENDER)


Skin: Positive: Clear


Extremities: Present: +1 Edema





- Labs and Meds


                          Comprehensive Metabolic Panel











  01/13/22 01/14/22 Range/Units





  12:35 09:02 


 


Sodium  151 H  149 H  (137-145)  mmol/L


 


Potassium  3.2 L  2.7 L*  (3.6-5.0)  mmol/L


 


Chloride  113.9 H  114.6 H  ()  mmol/L


 


Carbon Dioxide  24  24  (22-30)  mmol/L


 


BUN  12  9  (9-20)  mg/dL


 


Creatinine  0.6 L  0.6 L  (0.8-1.3)  mg/dL


 


Glucose  86  100  ()  mg/dL


 


Calcium  8.1 L  8.1 L  (8.4-10.2)  mg/dL
Assessment and Plan





- Patient Problems


(1) Edema


Current Visit: Yes   Status: Acute   


Plan to address problem: 


Patient's edema is does not appear cardiogenic, there is normal left ventricular

systolic ejection fraction, and edema is associated with marked electrolyte 

derangements and proteinuria.  Continue nephrology work-up, and optimization of 

the electrolyte status.








(2) Nonsustained ventricular tachycardia


Current Visit: Yes   Status: Acute   


Plan to address problem: 


We will manage asymptomatic ventricular ectopy by optimizing electrolyte status,

and a trial of low-dose beta-blockers as tolerated.








Subjective


Date of service: 01/03/22


Interval history: 





The patient is an elderly, frail-appearing male with multiple comorbidities.  He

has a history of prior CVA, and prior right hip surgery and appears essentially 

bedridden.  He was brought to the hospital for further evaluation of progressive

pressure ulcer on his buttocks.  In the emergency room, he was also noted with 

some lower extremity edema.  Laboratory exams revealed severe hypokalemia and 

hyponatremia, as well as proteinuria.  Cardiology consultation was requested for

assessment of the edema for a possible cardiac etiology.





Echocardiogram demonstrates normal left ventricular systolic function with 

ejection fraction 55 to 60%, no significant valvular lesions.  There is no EKG 

available in the chart for review, but on telemetry monitor the patient is 

mostly in a sinus rhythm.  There was a short burst of nonsustained ventricular 

tachycardia recorded yesterday.





Objective


                                   Vital Signs











  Temp Pulse Pulse Resp Resp BP BP


 


 01/03/22 11:04       


 


 01/03/22 07:57  98.9 F  60   20   104/60 


 


 01/03/22 04:47  97.6 F  91 H   18    102/61


 


 01/03/22 01:21       


 


 01/02/22 22:00    107 H   16  


 


 01/02/22 21:59  98.7 F  115 H   18    105/5


 


 01/02/22 16:17  98.2 F  96 H   16   108/53 


 


 01/02/22 15:13    103 H   20  


 


 01/02/22 13:05       














  Pulse Ox


 


 01/03/22 11:04  95


 


 01/03/22 07:57  96


 


 01/03/22 04:47  96


 


 01/03/22 01:21  95


 


 01/02/22 22:00  97


 


 01/02/22 21:59  100


 


 01/02/22 16:17  90


 


 01/02/22 15:13 


 


 01/02/22 13:05  95














- Physical Examination


General: No Apparent Distress, Cachectic, Other (Patient is frail, elderly, 

poorly communicative)


HEENT: Positive: Normocephaly


Neck: Positive: JVD/HJR


Cardiac: Positive: Reg Rate and Rhythm


Lungs: Positive: Decreased Breath Sounds


Neuro: Positive: Weakness (Generalized lethargy)


Abdomen: Positive: Soft, Other (NO ASCITES OR TENDER)


Skin: Positive: Clear


Extremities: Present: +1 Edema





- Labs and Meds


                          Comprehensive Metabolic Panel











  01/03/22 Range/Units





  00:06 


 


Sodium  132 L  (137-145)  mmol/L


 


Potassium  3.4 L  (3.6-5.0)  mmol/L


 


Chloride  95.1 L  ()  mmol/L


 


Carbon Dioxide  21 L  (22-30)  mmol/L


 


BUN  13  (9-20)  mg/dL


 


Creatinine  0.8  (0.8-1.3)  mg/dL


 


Glucose  109 H  ()  mg/dL


 


Calcium  8.1 L  (8.4-10.2)  mg/dL
Assessment and Plan





- Patient Problems


(1) Pneumonia


Current Visit: Yes   Status: Acute   





(2) CVA (cerebral vascular accident)


Current Visit: Yes   Status: Acute   





(3) Edema


Current Visit: Yes   Status: Acute   





(4) Hypokalemia


Current Visit: Yes   Status: Acute   





(5) Nonsustained ventricular tachycardia


Current Visit: Yes   Status: Acute   





Subjective


Interval history: 





doesnt want to wear o2


awake


o2 sat on ra 80 on 2lpm 93 to 94%





Objective


                               Vital Signs - 12hr











  01/08/22 01/09/22 01/09/22





  22:00 02:25 08:08


 


Temperature 98.2 F 98.8 F 


 


Pulse Rate 81 79 


 


Respiratory 16 18 





Rate   


 


Blood Pressure  119/57 


 


Blood Pressure 120/60  





[Right]   


 


O2 Sat by Pulse 100 96 96





Oximetry   














  01/09/22





  08:23


 


Temperature 98.0 F


 


Pulse Rate 87


 


Respiratory 16





Rate 


 


Blood Pressure 130/53


 


Blood Pressure 





[Right] 


 


O2 Sat by Pulse 94





Oximetry 











Constitutional: no acute distress, alert


ENT: oropharynx moist


Neck: supple


Ascultation: Bilateral: rhonchi


Cardiovascular: regular rate and rhythm


Gastrointestinal: normoactive bowel sounds, non-distended


Integumentary: normal


Extremities: no cyanosis


Psychiatric: mood appropriate


CBC and BMP: 


                                 01/09/22 07:41





                                 01/08/22 08:28


ABG, PT/INR, D-dimer: 


ABG











ABG pH  7.431 pH Units (7.350-7.450)   01/06/22  12:30    


 


ABG pCO2  49.5 mm Hg  01/06/22  12:30    


 


ABG pO2  25.2 mm Hg (80.0-90.0)  L*  01/06/22  12:30    


 


ABG O2 Saturation  41.1 % (95.0-99.0)  L  01/06/22  12:30    





PT/INR, D-dimer











PT  14.5 Sec. (12.2-14.9)   01/05/22  14:48    


 


INR  1.02  (0.87-1.13)   01/05/22  14:48    








Abnormal lab findings: 


                                  Abnormal Labs











  12/30/21 12/30/21 12/31/21





  23:51 23:51 01:00


 


WBC   


 


RBC   


 


Hgb  11.4 L  


 


Hct  35.3 L  


 


MCH  27 L  


 


MCHC   


 


RDW  19.9 H  


 


Lymph % (Auto)  11.4 L  


 


Mono % (Auto)  13.3 H  


 


Mono # (Auto)  1.4 H  


 


Seg Neutrophils %  75.2 H  


 


Seg Neuts % (Manual)   


 


Seg Neutrophils #  8.0 H  


 


PT   


 


INR   


 


APTT   


 


Heparin Anti-Xa Level   


 


ABG pO2   


 


ABG HCO3   


 


ABG O2 Saturation   


 


ABG Base Excess   


 


ABG Hemoglobin   


 


Oxyhemoglobin   


 


Sodium   127 L 


 


Potassium   2.0 L* 


 


Chloride   86.1 L 


 


Carbon Dioxide   


 


Creatinine   


 


Glucose   124 H 


 


Calcium   8.1 L 


 


Total Bilirubin   1.40 H 


 


Alkaline Phosphatase   141 H 


 


NT-Pro-B Natriuret Pep   1300 H 


 


Total Protein   5.9 L 


 


Albumin   2.5 L 


 


Urine WBC (Auto)    9.0 H














  01/01/22 01/01/22 01/03/22





  11:15 11:15 00:06


 


WBC   


 


RBC   


 


Hgb  11.2 L  


 


Hct  35.4 L  


 


MCH   


 


MCHC   


 


RDW  20.6 H  


 


Lymph % (Auto)   


 


Mono % (Auto)   


 


Mono # (Auto)   


 


Seg Neutrophils %   


 


Seg Neuts % (Manual)  72.0 H  


 


Seg Neutrophils #   


 


PT   


 


INR   


 


APTT   


 


Heparin Anti-Xa Level   


 


ABG pO2   


 


ABG HCO3   


 


ABG O2 Saturation   


 


ABG Base Excess   


 


ABG Hemoglobin   


 


Oxyhemoglobin   


 


Sodium   129 L  132 L


 


Potassium   3.3 L D  3.4 L


 


Chloride   93.5 L  95.1 L


 


Carbon Dioxide    21 L


 


Creatinine   


 


Glucose    109 H


 


Calcium   7.9 L  8.1 L


 


Total Bilirubin   


 


Alkaline Phosphatase   


 


NT-Pro-B Natriuret Pep   


 


Total Protein   


 


Albumin   


 


Urine WBC (Auto)   














  01/03/22 01/03/22 01/04/22





  20:38 20:38 00:41


 


WBC   


 


RBC   


 


Hgb  9.0 L  


 


Hct  28.8 L D  


 


MCH   


 


MCHC   


 


RDW   


 


Lymph % (Auto)   


 


Mono % (Auto)   


 


Mono # (Auto)   


 


Seg Neutrophils %   


 


Seg Neuts % (Manual)   


 


Seg Neutrophils #   


 


PT   16.5 H 


 


INR   1.20 H 


 


APTT   67.8 H* 


 


Heparin Anti-Xa Level    0.21 L


 


ABG pO2   


 


ABG HCO3   


 


ABG O2 Saturation   


 


ABG Base Excess   


 


ABG Hemoglobin   


 


Oxyhemoglobin   


 


Sodium   


 


Potassium   


 


Chloride   


 


Carbon Dioxide   


 


Creatinine   


 


Glucose   


 


Calcium   


 


Total Bilirubin   


 


Alkaline Phosphatase   


 


NT-Pro-B Natriuret Pep   


 


Total Protein   


 


Albumin   


 


Urine WBC (Auto)   














  01/04/22 01/04/22 01/04/22





  04:48 10:32 13:35


 


WBC   


 


RBC    3.38 L


 


Hgb    9.5 L


 


Hct    28.8 L


 


MCH   


 


MCHC   


 


RDW    20.8 H


 


Lymph % (Auto)   


 


Mono % (Auto)   


 


Mono # (Auto)   


 


Seg Neutrophils %   


 


Seg Neuts % (Manual)   


 


Seg Neutrophils #   


 


PT   15.7 H 


 


INR   


 


APTT   135.0 H* 


 


Heparin Anti-Xa Level   


 


ABG pO2   


 


ABG HCO3   


 


ABG O2 Saturation   


 


ABG Base Excess   


 


ABG Hemoglobin   


 


Oxyhemoglobin   


 


Sodium   


 


Potassium  3.4 L  


 


Chloride   


 


Carbon Dioxide   


 


Creatinine  0.7 L  


 


Glucose   


 


Calcium  7.9 L  


 


Total Bilirubin   


 


Alkaline Phosphatase   


 


NT-Pro-B Natriuret Pep   


 


Total Protein   


 


Albumin   


 


Urine WBC (Auto)   














  01/04/22 01/04/22 01/05/22





  13:35 13:49 02:23


 


WBC   


 


RBC   


 


Hgb    9.4 L


 


Hct    29.3 L


 


MCH   


 


MCHC   


 


RDW   


 


Lymph % (Auto)   


 


Mono % (Auto)   


 


Mono # (Auto)   


 


Seg Neutrophils %   


 


Seg Neuts % (Manual)   


 


Seg Neutrophils #   


 


PT   


 


INR   


 


APTT   171.6 H* 


 


Heparin Anti-Xa Level   


 


ABG pO2   


 


ABG HCO3   


 


ABG O2 Saturation   


 


ABG Base Excess   


 


ABG Hemoglobin   


 


Oxyhemoglobin   


 


Sodium   


 


Potassium   


 


Chloride   


 


Carbon Dioxide   


 


Creatinine  0.6 L  


 


Glucose   


 


Calcium   


 


Total Bilirubin   


 


Alkaline Phosphatase   


 


NT-Pro-B Natriuret Pep   


 


Total Protein   


 


Albumin   


 


Urine WBC (Auto)   














  01/05/22 01/05/22 01/05/22





  02:23 14:48 14:48


 


WBC   


 


RBC   


 


Hgb   


 


Hct   


 


MCH   


 


MCHC   


 


RDW   


 


Lymph % (Auto)   


 


Mono % (Auto)   


 


Mono # (Auto)   


 


Seg Neutrophils %   


 


Seg Neuts % (Manual)   


 


Seg Neutrophils #   


 


PT   


 


INR   


 


APTT   


 


Heparin Anti-Xa Level  < 0.10 L  < 0.10 L 


 


ABG pO2   


 


ABG HCO3   


 


ABG O2 Saturation   


 


ABG Base Excess   


 


ABG Hemoglobin   


 


Oxyhemoglobin   


 


Sodium    136 L


 


Potassium   


 


Chloride    97.2 L


 


Carbon Dioxide   


 


Creatinine    0.6 L


 


Glucose   


 


Calcium   


 


Total Bilirubin   


 


Alkaline Phosphatase   


 


NT-Pro-B Natriuret Pep   


 


Total Protein   


 


Albumin   


 


Urine WBC (Auto)   














  01/05/22 01/05/22 01/06/22





  14:48 Unknown 12:30


 


WBC   


 


RBC   


 


Hgb   11.0 L 


 


Hct   34.7 L 


 


MCH   


 


MCHC   


 


RDW   21.0 H 


 


Lymph % (Auto)   


 


Mono % (Auto)   


 


Mono # (Auto)   


 


Seg Neutrophils %   


 


Seg Neuts % (Manual)   


 


Seg Neutrophils #   


 


PT   


 


INR   


 


APTT   


 


Heparin Anti-Xa Level   


 


ABG pO2    25.2 L*


 


ABG HCO3    32.2 H


 


ABG O2 Saturation    41.1 L


 


ABG Base Excess    6.6 H


 


ABG Hemoglobin    10.2 L


 


Oxyhemoglobin    40.1 L


 


Sodium   


 


Potassium   


 


Chloride   


 


Carbon Dioxide   


 


Creatinine  0.6 L  


 


Glucose   


 


Calcium   


 


Total Bilirubin   


 


Alkaline Phosphatase   


 


NT-Pro-B Natriuret Pep   


 


Total Protein   


 


Albumin   


 


Urine WBC (Auto)   














  01/06/22 01/07/22 01/07/22





  13:22 10:07 10:07


 


WBC  19.9 H  16.7 H 


 


RBC   


 


Hgb  10.8 L  10.7 L 


 


Hct  34.6 L  34.7 L 


 


MCH   27 L 


 


MCHC  31 L  31 L 


 


RDW  21.1 H  20.8 H 


 


Lymph % (Auto)   


 


Mono % (Auto)   


 


Mono # (Auto)   


 


Seg Neutrophils %   


 


Seg Neuts % (Manual)   


 


Seg Neutrophils #   


 


PT   


 


INR   


 


APTT   


 


Heparin Anti-Xa Level   


 


ABG pO2   


 


ABG HCO3   


 


ABG O2 Saturation   


 


ABG Base Excess   


 


ABG Hemoglobin   


 


Oxyhemoglobin   


 


Sodium   


 


Potassium   


 


Chloride   


 


Carbon Dioxide   


 


Creatinine    0.6 L


 


Glucose   


 


Calcium   


 


Total Bilirubin   


 


Alkaline Phosphatase   


 


NT-Pro-B Natriuret Pep   


 


Total Protein   


 


Albumin   


 


Urine WBC (Auto)   














  01/08/22 01/08/22 01/09/22





  08:28 08:28 07:41


 


WBC  14.2 H  


 


RBC  3.40 L   3.56 L


 


Hgb  9.4 L   10.0 L


 


Hct  30.3 L   31.7 L


 


MCH   


 


MCHC  31 L  


 


RDW  21.4 H   20.7 H


 


Lymph % (Auto)   


 


Mono % (Auto)   


 


Mono # (Auto)   


 


Seg Neutrophils %   


 


Seg Neuts % (Manual)   


 


Seg Neutrophils #   


 


PT   


 


INR   


 


APTT   


 


Heparin Anti-Xa Level   


 


ABG pO2   


 


ABG HCO3   


 


ABG O2 Saturation   


 


ABG Base Excess   


 


ABG Hemoglobin   


 


Oxyhemoglobin   


 


Sodium   


 


Potassium   


 


Chloride   


 


Carbon Dioxide   


 


Creatinine   0.5 L 


 


Glucose   


 


Calcium   


 


Total Bilirubin   


 


Alkaline Phosphatase   


 


NT-Pro-B Natriuret Pep   


 


Total Protein   


 


Albumin   


 


Urine WBC (Auto)
Assessment and Plan





75 y/o with acute respiratory failure and pneumonia along with DVT





1/14/22:  Pulm status is stable.  Off oxygen.  Will sign off.





1/13/22:  Continue to wean FiO2 as tolerated.  Per ID note, abx for 5 days.  If 

able, will need ambulatory pulse ox.





1/12/22: Continue to wean FiO2 as tolerated.  If able ambulatry pulse ox prior 

to discharge.  Abx for 5 days per  ID consult note.





1/11/22:  Wean FIO for sats >88%.  WIll need ambulatory test prior to discharge.

 Abx duration per ID recs, see their consult note.





WEan FiO2 as tolerated


Abx therapy per ID recommendations





Subjective


Date of service: 01/14/22


Interval history: 





patient being discharged today.  On room air.





Objective


                               Vital Signs - 12hr











  01/14/22 01/14/22 01/14/22





  03:43 08:35 09:19


 


Temperature 98.3 F  


 


Pulse Rate 69 71 


 


Pulse Rate [   63





Anterior   





Bilateral   





Throughout]   


 


Pulse Rate [   





Radial]   


 


Respiratory 18  





Rate   


 


Respiratory   18





Rate [Anterior   





Bilateral   





Throughout]   


 


Blood Pressure 141/69  


 


O2 Sat by Pulse 96  





Oximetry   














  01/14/22 01/14/22





  09:21 11:00


 


Temperature 98.3 F 


 


Pulse Rate 71 


 


Pulse Rate [  





Anterior  





Bilateral  





Throughout]  


 


Pulse Rate [  71





Radial]  


 


Respiratory 20 18





Rate  


 


Respiratory  





Rate [Anterior  





Bilateral  





Throughout]  


 


Blood Pressure 133/67 


 


O2 Sat by Pulse 98 95





Oximetry  











Constitutional: no acute distress, alert


ENT: oropharynx moist


Neck: supple


Ascultation: Bilateral: rhonchi


Cardiovascular: regular rate and rhythm


Gastrointestinal: normoactive bowel sounds, non-distended


Integumentary: normal


Extremities: no cyanosis


Psychiatric: mood appropriate


CBC and BMP: 


                                 01/12/22 07:53





                                 01/14/22 09:02


ABG, PT/INR, D-dimer: 


ABG











ABG pH  7.431 pH Units (7.350-7.450)   01/06/22  12:30    


 


ABG pCO2  49.5 mm Hg  01/06/22  12:30    


 


ABG pO2  25.2 mm Hg (80.0-90.0)  L*  01/06/22  12:30    


 


ABG O2 Saturation  41.1 % (95.0-99.0)  L  01/06/22  12:30    





PT/INR, D-dimer











PT  14.5 Sec. (12.2-14.9)   01/05/22  14:48    


 


INR  1.02  (0.87-1.13)   01/05/22  14:48    








Abnormal lab findings: 


                                  Abnormal Labs











  12/30/21 12/30/21 12/31/21





  23:51 23:51 01:00


 


WBC   


 


RBC   


 


Hgb  11.4 L  


 


Hct  35.3 L  


 


MCH  27 L  


 


MCHC   


 


RDW  19.9 H  


 


Lymph % (Auto)  11.4 L  


 


Mono % (Auto)  13.3 H  


 


Mono # (Auto)  1.4 H  


 


Seg Neutrophils %  75.2 H  


 


Seg Neuts % (Manual)   


 


Seg Neutrophils #  8.0 H  


 


PT   


 


INR   


 


APTT   


 


Heparin Anti-Xa Level   


 


ABG pO2   


 


ABG HCO3   


 


ABG O2 Saturation   


 


ABG Base Excess   


 


ABG Hemoglobin   


 


Oxyhemoglobin   


 


Sodium   127 L 


 


Potassium   2.0 L* 


 


Chloride   86.1 L 


 


Carbon Dioxide   


 


Creatinine   


 


Glucose   124 H 


 


Calcium   8.1 L 


 


Magnesium   


 


Total Bilirubin   1.40 H 


 


Alkaline Phosphatase   141 H 


 


NT-Pro-B Natriuret Pep   1300 H 


 


Total Protein   5.9 L 


 


Albumin   2.5 L 


 


Urine WBC (Auto)    9.0 H














  01/01/22 01/01/22 01/03/22





  11:15 11:15 00:06


 


WBC   


 


RBC   


 


Hgb  11.2 L  


 


Hct  35.4 L  


 


MCH   


 


MCHC   


 


RDW  20.6 H  


 


Lymph % (Auto)   


 


Mono % (Auto)   


 


Mono # (Auto)   


 


Seg Neutrophils %   


 


Seg Neuts % (Manual)  72.0 H  


 


Seg Neutrophils #   


 


PT   


 


INR   


 


APTT   


 


Heparin Anti-Xa Level   


 


ABG pO2   


 


ABG HCO3   


 


ABG O2 Saturation   


 


ABG Base Excess   


 


ABG Hemoglobin   


 


Oxyhemoglobin   


 


Sodium   129 L  132 L


 


Potassium   3.3 L D  3.4 L


 


Chloride   93.5 L  95.1 L


 


Carbon Dioxide    21 L


 


Creatinine   


 


Glucose    109 H


 


Calcium   7.9 L  8.1 L


 


Magnesium   


 


Total Bilirubin   


 


Alkaline Phosphatase   


 


NT-Pro-B Natriuret Pep   


 


Total Protein   


 


Albumin   


 


Urine WBC (Auto)   














  01/03/22 01/03/22 01/04/22





  20:38 20:38 00:41


 


WBC   


 


RBC   


 


Hgb  9.0 L  


 


Hct  28.8 L D  


 


MCH   


 


MCHC   


 


RDW   


 


Lymph % (Auto)   


 


Mono % (Auto)   


 


Mono # (Auto)   


 


Seg Neutrophils %   


 


Seg Neuts % (Manual)   


 


Seg Neutrophils #   


 


PT   16.5 H 


 


INR   1.20 H 


 


APTT   67.8 H* 


 


Heparin Anti-Xa Level    0.21 L


 


ABG pO2   


 


ABG HCO3   


 


ABG O2 Saturation   


 


ABG Base Excess   


 


ABG Hemoglobin   


 


Oxyhemoglobin   


 


Sodium   


 


Potassium   


 


Chloride   


 


Carbon Dioxide   


 


Creatinine   


 


Glucose   


 


Calcium   


 


Magnesium   


 


Total Bilirubin   


 


Alkaline Phosphatase   


 


NT-Pro-B Natriuret Pep   


 


Total Protein   


 


Albumin   


 


Urine WBC (Auto)   














  01/04/22 01/04/22 01/04/22





  04:48 10:32 13:35


 


WBC   


 


RBC    3.38 L


 


Hgb    9.5 L


 


Hct    28.8 L


 


MCH   


 


MCHC   


 


RDW    20.8 H


 


Lymph % (Auto)   


 


Mono % (Auto)   


 


Mono # (Auto)   


 


Seg Neutrophils %   


 


Seg Neuts % (Manual)   


 


Seg Neutrophils #   


 


PT   15.7 H 


 


INR   


 


APTT   135.0 H* 


 


Heparin Anti-Xa Level   


 


ABG pO2   


 


ABG HCO3   


 


ABG O2 Saturation   


 


ABG Base Excess   


 


ABG Hemoglobin   


 


Oxyhemoglobin   


 


Sodium   


 


Potassium  3.4 L  


 


Chloride   


 


Carbon Dioxide   


 


Creatinine  0.7 L  


 


Glucose   


 


Calcium  7.9 L  


 


Magnesium   


 


Total Bilirubin   


 


Alkaline Phosphatase   


 


NT-Pro-B Natriuret Pep   


 


Total Protein   


 


Albumin   


 


Urine WBC (Auto)   














  01/04/22 01/04/22 01/05/22





  13:35 13:49 02:23


 


WBC   


 


RBC   


 


Hgb    9.4 L


 


Hct    29.3 L


 


MCH   


 


MCHC   


 


RDW   


 


Lymph % (Auto)   


 


Mono % (Auto)   


 


Mono # (Auto)   


 


Seg Neutrophils %   


 


Seg Neuts % (Manual)   


 


Seg Neutrophils #   


 


PT   


 


INR   


 


APTT   171.6 H* 


 


Heparin Anti-Xa Level   


 


ABG pO2   


 


ABG HCO3   


 


ABG O2 Saturation   


 


ABG Base Excess   


 


ABG Hemoglobin   


 


Oxyhemoglobin   


 


Sodium   


 


Potassium   


 


Chloride   


 


Carbon Dioxide   


 


Creatinine  0.6 L  


 


Glucose   


 


Calcium   


 


Magnesium   


 


Total Bilirubin   


 


Alkaline Phosphatase   


 


NT-Pro-B Natriuret Pep   


 


Total Protein   


 


Albumin   


 


Urine WBC (Auto)   














  01/05/22 01/05/22 01/05/22





  02:23 14:48 14:48


 


WBC   


 


RBC   


 


Hgb   


 


Hct   


 


MCH   


 


MCHC   


 


RDW   


 


Lymph % (Auto)   


 


Mono % (Auto)   


 


Mono # (Auto)   


 


Seg Neutrophils %   


 


Seg Neuts % (Manual)   


 


Seg Neutrophils #   


 


PT   


 


INR   


 


APTT   


 


Heparin Anti-Xa Level  < 0.10 L  < 0.10 L 


 


ABG pO2   


 


ABG HCO3   


 


ABG O2 Saturation   


 


ABG Base Excess   


 


ABG Hemoglobin   


 


Oxyhemoglobin   


 


Sodium    136 L


 


Potassium   


 


Chloride    97.2 L


 


Carbon Dioxide   


 


Creatinine    0.6 L


 


Glucose   


 


Calcium   


 


Magnesium   


 


Total Bilirubin   


 


Alkaline Phosphatase   


 


NT-Pro-B Natriuret Pep   


 


Total Protein   


 


Albumin   


 


Urine WBC (Auto)   














  01/05/22 01/05/22 01/06/22





  14:48 Unknown 12:30


 


WBC   


 


RBC   


 


Hgb   11.0 L 


 


Hct   34.7 L 


 


MCH   


 


MCHC   


 


RDW   21.0 H 


 


Lymph % (Auto)   


 


Mono % (Auto)   


 


Mono # (Auto)   


 


Seg Neutrophils %   


 


Seg Neuts % (Manual)   


 


Seg Neutrophils #   


 


PT   


 


INR   


 


APTT   


 


Heparin Anti-Xa Level   


 


ABG pO2    25.2 L*


 


ABG HCO3    32.2 H


 


ABG O2 Saturation    41.1 L


 


ABG Base Excess    6.6 H


 


ABG Hemoglobin    10.2 L


 


Oxyhemoglobin    40.1 L


 


Sodium   


 


Potassium   


 


Chloride   


 


Carbon Dioxide   


 


Creatinine  0.6 L  


 


Glucose   


 


Calcium   


 


Magnesium   


 


Total Bilirubin   


 


Alkaline Phosphatase   


 


NT-Pro-B Natriuret Pep   


 


Total Protein   


 


Albumin   


 


Urine WBC (Auto)   














  01/06/22 01/07/22 01/07/22





  13:22 10:07 10:07


 


WBC  19.9 H  16.7 H 


 


RBC   


 


Hgb  10.8 L  10.7 L 


 


Hct  34.6 L  34.7 L 


 


MCH   27 L 


 


MCHC  31 L  31 L 


 


RDW  21.1 H  20.8 H 


 


Lymph % (Auto)   


 


Mono % (Auto)   


 


Mono # (Auto)   


 


Seg Neutrophils %   


 


Seg Neuts % (Manual)   


 


Seg Neutrophils #   


 


PT   


 


INR   


 


APTT   


 


Heparin Anti-Xa Level   


 


ABG pO2   


 


ABG HCO3   


 


ABG O2 Saturation   


 


ABG Base Excess   


 


ABG Hemoglobin   


 


Oxyhemoglobin   


 


Sodium   


 


Potassium   


 


Chloride   


 


Carbon Dioxide   


 


Creatinine    0.6 L


 


Glucose   


 


Calcium   


 


Magnesium   


 


Total Bilirubin   


 


Alkaline Phosphatase   


 


NT-Pro-B Natriuret Pep   


 


Total Protein   


 


Albumin   


 


Urine WBC (Auto)   














  01/08/22 01/08/22 01/09/22





  08:28 08:28 07:41


 


WBC  14.2 H  


 


RBC  3.40 L   3.56 L


 


Hgb  9.4 L   10.0 L


 


Hct  30.3 L   31.7 L


 


MCH   


 


MCHC  31 L  


 


RDW  21.4 H   20.7 H


 


Lymph % (Auto)   


 


Mono % (Auto)   


 


Mono # (Auto)   


 


Seg Neutrophils %   


 


Seg Neuts % (Manual)   


 


Seg Neutrophils #   


 


PT   


 


INR   


 


APTT   


 


Heparin Anti-Xa Level   


 


ABG pO2   


 


ABG HCO3   


 


ABG O2 Saturation   


 


ABG Base Excess   


 


ABG Hemoglobin   


 


Oxyhemoglobin   


 


Sodium   


 


Potassium   


 


Chloride   


 


Carbon Dioxide   


 


Creatinine   0.5 L 


 


Glucose   


 


Calcium   


 


Magnesium   


 


Total Bilirubin   


 


Alkaline Phosphatase   


 


NT-Pro-B Natriuret Pep   


 


Total Protein   


 


Albumin   


 


Urine WBC (Auto)   














  01/09/22 01/10/22 01/10/22





  13:11 15:06 15:06


 


WBC   


 


RBC   3.17 L 


 


Hgb   8.6 L 


 


Hct   27.4 L 


 


MCH   27 L 


 


MCHC   31 L 


 


RDW   20.5 H 


 


Lymph % (Auto)   


 


Mono % (Auto)   12.3 H 


 


Mono # (Auto)   1.0 H 


 


Seg Neutrophils %   


 


Seg Neuts % (Manual)   


 


Seg Neutrophils #   


 


PT   


 


INR   


 


APTT   


 


Heparin Anti-Xa Level   


 


ABG pO2   


 


ABG HCO3   


 


ABG O2 Saturation   


 


ABG Base Excess   


 


ABG Hemoglobin   


 


Oxyhemoglobin   


 


Sodium    147 H


 


Potassium  5.4 H  


 


Chloride   


 


Carbon Dioxide   


 


Creatinine    0.7 L


 


Glucose  52 L   107 H


 


Calcium   


 


Magnesium   


 


Total Bilirubin   


 


Alkaline Phosphatase   


 


NT-Pro-B Natriuret Pep   


 


Total Protein  5.7 L  


 


Albumin  2.5 L  


 


Urine WBC (Auto)   














  01/11/22 01/12/22 01/13/22





  15:24 07:53 12:35


 


WBC   


 


RBC  2.97 L  


 


Hgb  8.3 L  9.1 L 


 


Hct  26.0 L  29.1 L 


 


MCH   


 


MCHC   


 


RDW  19.9 H  


 


Lymph % (Auto)   


 


Mono % (Auto)   


 


Mono # (Auto)   


 


Seg Neutrophils %   


 


Seg Neuts % (Manual)   


 


Seg Neutrophils #   


 


PT   


 


INR   


 


APTT   


 


Heparin Anti-Xa Level   


 


ABG pO2   


 


ABG HCO3   


 


ABG O2 Saturation   


 


ABG Base Excess   


 


ABG Hemoglobin   


 


Oxyhemoglobin   


 


Sodium    151 H


 


Potassium    3.2 L


 


Chloride    113.9 H


 


Carbon Dioxide   


 


Creatinine    0.6 L


 


Glucose   


 


Calcium    8.1 L


 


Magnesium   


 


Total Bilirubin   


 


Alkaline Phosphatase   


 


NT-Pro-B Natriuret Pep   


 


Total Protein   


 


Albumin   


 


Urine WBC (Auto)   














  01/14/22





  09:02


 


WBC 


 


RBC 


 


Hgb 


 


Hct 


 


MCH 


 


MCHC 


 


RDW 


 


Lymph % (Auto) 


 


Mono % (Auto) 


 


Mono # (Auto) 


 


Seg Neutrophils % 


 


Seg Neuts % (Manual) 


 


Seg Neutrophils # 


 


PT 


 


INR 


 


APTT 


 


Heparin Anti-Xa Level 


 


ABG pO2 


 


ABG HCO3 


 


ABG O2 Saturation 


 


ABG Base Excess 


 


ABG Hemoglobin 


 


Oxyhemoglobin 


 


Sodium  149 H


 


Potassium  2.7 L*


 


Chloride  114.6 H


 


Carbon Dioxide 


 


Creatinine  0.6 L


 


Glucose 


 


Calcium  8.1 L


 


Magnesium  1.50 L


 


Total Bilirubin 


 


Alkaline Phosphatase 


 


NT-Pro-B Natriuret Pep 


 


Total Protein 


 


Albumin 


 


Urine WBC (Auto)
Assessment and Plan





75 y/o with acute respiratory failure and pneumonia along with DVT





WEan FiO2 as tolerated


Abx therapy per ID recommendations





Subjective


Date of service: 01/10/22


Interval history: 





No acute events.  Stable on 2 liters.  Renal function improving.





Objective


                               Vital Signs - 12hr











  01/10/22 01/10/22 01/10/22





  05:30 08:00 08:54


 


Temperature 98.3 F  98.3 F


 


Pulse Rate 78  78


 


Pulse Rate [   





Anterior   





Bilateral   





Throughout]   


 


Respiratory 18  20





Rate   


 


Respiratory   





Rate [Anterior   





Bilateral   





Throughout]   


 


Blood Pressure 150/75  129/80


 


O2 Sat by Pulse 100 98 100





Oximetry   














  01/10/22





  09:46


 


Temperature 


 


Pulse Rate 


 


Pulse Rate [ 72





Anterior 





Bilateral 





Throughout] 


 


Respiratory 





Rate 


 


Respiratory 20





Rate [Anterior 





Bilateral 





Throughout] 


 


Blood Pressure 


 


O2 Sat by Pulse 98





Oximetry 











Constitutional: no acute distress, alert


ENT: oropharynx moist


Neck: supple


Ascultation: Bilateral: rhonchi


Cardiovascular: regular rate and rhythm


Gastrointestinal: normoactive bowel sounds, non-distended


Integumentary: normal


Extremities: no cyanosis


Psychiatric: mood appropriate


CBC and BMP: 


                                 01/09/22 07:41





                                 01/09/22 13:11


ABG, PT/INR, D-dimer: 


ABG











ABG pH  7.431 pH Units (7.350-7.450)   01/06/22  12:30    


 


ABG pCO2  49.5 mm Hg  01/06/22  12:30    


 


ABG pO2  25.2 mm Hg (80.0-90.0)  L*  01/06/22  12:30    


 


ABG O2 Saturation  41.1 % (95.0-99.0)  L  01/06/22  12:30    





PT/INR, D-dimer











PT  14.5 Sec. (12.2-14.9)   01/05/22  14:48    


 


INR  1.02  (0.87-1.13)   01/05/22  14:48    








Abnormal lab findings: 


                                  Abnormal Labs











  12/30/21 12/30/21 12/31/21





  23:51 23:51 01:00


 


WBC   


 


RBC   


 


Hgb  11.4 L  


 


Hct  35.3 L  


 


MCH  27 L  


 


MCHC   


 


RDW  19.9 H  


 


Lymph % (Auto)  11.4 L  


 


Mono % (Auto)  13.3 H  


 


Mono # (Auto)  1.4 H  


 


Seg Neutrophils %  75.2 H  


 


Seg Neuts % (Manual)   


 


Seg Neutrophils #  8.0 H  


 


PT   


 


INR   


 


APTT   


 


Heparin Anti-Xa Level   


 


ABG pO2   


 


ABG HCO3   


 


ABG O2 Saturation   


 


ABG Base Excess   


 


ABG Hemoglobin   


 


Oxyhemoglobin   


 


Sodium   127 L 


 


Potassium   2.0 L* 


 


Chloride   86.1 L 


 


Carbon Dioxide   


 


Creatinine   


 


Glucose   124 H 


 


Calcium   8.1 L 


 


Total Bilirubin   1.40 H 


 


Alkaline Phosphatase   141 H 


 


NT-Pro-B Natriuret Pep   1300 H 


 


Total Protein   5.9 L 


 


Albumin   2.5 L 


 


Urine WBC (Auto)    9.0 H














  01/01/22 01/01/22 01/03/22





  11:15 11:15 00:06


 


WBC   


 


RBC   


 


Hgb  11.2 L  


 


Hct  35.4 L  


 


MCH   


 


MCHC   


 


RDW  20.6 H  


 


Lymph % (Auto)   


 


Mono % (Auto)   


 


Mono # (Auto)   


 


Seg Neutrophils %   


 


Seg Neuts % (Manual)  72.0 H  


 


Seg Neutrophils #   


 


PT   


 


INR   


 


APTT   


 


Heparin Anti-Xa Level   


 


ABG pO2   


 


ABG HCO3   


 


ABG O2 Saturation   


 


ABG Base Excess   


 


ABG Hemoglobin   


 


Oxyhemoglobin   


 


Sodium   129 L  132 L


 


Potassium   3.3 L D  3.4 L


 


Chloride   93.5 L  95.1 L


 


Carbon Dioxide    21 L


 


Creatinine   


 


Glucose    109 H


 


Calcium   7.9 L  8.1 L


 


Total Bilirubin   


 


Alkaline Phosphatase   


 


NT-Pro-B Natriuret Pep   


 


Total Protein   


 


Albumin   


 


Urine WBC (Auto)   














  01/03/22 01/03/22 01/04/22





  20:38 20:38 00:41


 


WBC   


 


RBC   


 


Hgb  9.0 L  


 


Hct  28.8 L D  


 


MCH   


 


MCHC   


 


RDW   


 


Lymph % (Auto)   


 


Mono % (Auto)   


 


Mono # (Auto)   


 


Seg Neutrophils %   


 


Seg Neuts % (Manual)   


 


Seg Neutrophils #   


 


PT   16.5 H 


 


INR   1.20 H 


 


APTT   67.8 H* 


 


Heparin Anti-Xa Level    0.21 L


 


ABG pO2   


 


ABG HCO3   


 


ABG O2 Saturation   


 


ABG Base Excess   


 


ABG Hemoglobin   


 


Oxyhemoglobin   


 


Sodium   


 


Potassium   


 


Chloride   


 


Carbon Dioxide   


 


Creatinine   


 


Glucose   


 


Calcium   


 


Total Bilirubin   


 


Alkaline Phosphatase   


 


NT-Pro-B Natriuret Pep   


 


Total Protein   


 


Albumin   


 


Urine WBC (Auto)   














  01/04/22 01/04/22 01/04/22





  04:48 10:32 13:35


 


WBC   


 


RBC    3.38 L


 


Hgb    9.5 L


 


Hct    28.8 L


 


MCH   


 


MCHC   


 


RDW    20.8 H


 


Lymph % (Auto)   


 


Mono % (Auto)   


 


Mono # (Auto)   


 


Seg Neutrophils %   


 


Seg Neuts % (Manual)   


 


Seg Neutrophils #   


 


PT   15.7 H 


 


INR   


 


APTT   135.0 H* 


 


Heparin Anti-Xa Level   


 


ABG pO2   


 


ABG HCO3   


 


ABG O2 Saturation   


 


ABG Base Excess   


 


ABG Hemoglobin   


 


Oxyhemoglobin   


 


Sodium   


 


Potassium  3.4 L  


 


Chloride   


 


Carbon Dioxide   


 


Creatinine  0.7 L  


 


Glucose   


 


Calcium  7.9 L  


 


Total Bilirubin   


 


Alkaline Phosphatase   


 


NT-Pro-B Natriuret Pep   


 


Total Protein   


 


Albumin   


 


Urine WBC (Auto)   














  01/04/22 01/04/22 01/05/22





  13:35 13:49 02:23


 


WBC   


 


RBC   


 


Hgb    9.4 L


 


Hct    29.3 L


 


MCH   


 


MCHC   


 


RDW   


 


Lymph % (Auto)   


 


Mono % (Auto)   


 


Mono # (Auto)   


 


Seg Neutrophils %   


 


Seg Neuts % (Manual)   


 


Seg Neutrophils #   


 


PT   


 


INR   


 


APTT   171.6 H* 


 


Heparin Anti-Xa Level   


 


ABG pO2   


 


ABG HCO3   


 


ABG O2 Saturation   


 


ABG Base Excess   


 


ABG Hemoglobin   


 


Oxyhemoglobin   


 


Sodium   


 


Potassium   


 


Chloride   


 


Carbon Dioxide   


 


Creatinine  0.6 L  


 


Glucose   


 


Calcium   


 


Total Bilirubin   


 


Alkaline Phosphatase   


 


NT-Pro-B Natriuret Pep   


 


Total Protein   


 


Albumin   


 


Urine WBC (Auto)   














  01/05/22 01/05/22 01/05/22





  02:23 14:48 14:48


 


WBC   


 


RBC   


 


Hgb   


 


Hct   


 


MCH   


 


MCHC   


 


RDW   


 


Lymph % (Auto)   


 


Mono % (Auto)   


 


Mono # (Auto)   


 


Seg Neutrophils %   


 


Seg Neuts % (Manual)   


 


Seg Neutrophils #   


 


PT   


 


INR   


 


APTT   


 


Heparin Anti-Xa Level  < 0.10 L  < 0.10 L 


 


ABG pO2   


 


ABG HCO3   


 


ABG O2 Saturation   


 


ABG Base Excess   


 


ABG Hemoglobin   


 


Oxyhemoglobin   


 


Sodium    136 L


 


Potassium   


 


Chloride    97.2 L


 


Carbon Dioxide   


 


Creatinine    0.6 L


 


Glucose   


 


Calcium   


 


Total Bilirubin   


 


Alkaline Phosphatase   


 


NT-Pro-B Natriuret Pep   


 


Total Protein   


 


Albumin   


 


Urine WBC (Auto)   














  01/05/22 01/05/22 01/06/22





  14:48 Unknown 12:30


 


WBC   


 


RBC   


 


Hgb   11.0 L 


 


Hct   34.7 L 


 


MCH   


 


MCHC   


 


RDW   21.0 H 


 


Lymph % (Auto)   


 


Mono % (Auto)   


 


Mono # (Auto)   


 


Seg Neutrophils %   


 


Seg Neuts % (Manual)   


 


Seg Neutrophils #   


 


PT   


 


INR   


 


APTT   


 


Heparin Anti-Xa Level   


 


ABG pO2    25.2 L*


 


ABG HCO3    32.2 H


 


ABG O2 Saturation    41.1 L


 


ABG Base Excess    6.6 H


 


ABG Hemoglobin    10.2 L


 


Oxyhemoglobin    40.1 L


 


Sodium   


 


Potassium   


 


Chloride   


 


Carbon Dioxide   


 


Creatinine  0.6 L  


 


Glucose   


 


Calcium   


 


Total Bilirubin   


 


Alkaline Phosphatase   


 


NT-Pro-B Natriuret Pep   


 


Total Protein   


 


Albumin   


 


Urine WBC (Auto)   














  01/06/22 01/07/22 01/07/22





  13:22 10:07 10:07


 


WBC  19.9 H  16.7 H 


 


RBC   


 


Hgb  10.8 L  10.7 L 


 


Hct  34.6 L  34.7 L 


 


MCH   27 L 


 


MCHC  31 L  31 L 


 


RDW  21.1 H  20.8 H 


 


Lymph % (Auto)   


 


Mono % (Auto)   


 


Mono # (Auto)   


 


Seg Neutrophils %   


 


Seg Neuts % (Manual)   


 


Seg Neutrophils #   


 


PT   


 


INR   


 


APTT   


 


Heparin Anti-Xa Level   


 


ABG pO2   


 


ABG HCO3   


 


ABG O2 Saturation   


 


ABG Base Excess   


 


ABG Hemoglobin   


 


Oxyhemoglobin   


 


Sodium   


 


Potassium   


 


Chloride   


 


Carbon Dioxide   


 


Creatinine    0.6 L


 


Glucose   


 


Calcium   


 


Total Bilirubin   


 


Alkaline Phosphatase   


 


NT-Pro-B Natriuret Pep   


 


Total Protein   


 


Albumin   


 


Urine WBC (Auto)   














  01/08/22 01/08/22 01/09/22





  08:28 08:28 07:41


 


WBC  14.2 H  


 


RBC  3.40 L   3.56 L


 


Hgb  9.4 L   10.0 L


 


Hct  30.3 L   31.7 L


 


MCH   


 


MCHC  31 L  


 


RDW  21.4 H   20.7 H


 


Lymph % (Auto)   


 


Mono % (Auto)   


 


Mono # (Auto)   


 


Seg Neutrophils %   


 


Seg Neuts % (Manual)   


 


Seg Neutrophils #   


 


PT   


 


INR   


 


APTT   


 


Heparin Anti-Xa Level   


 


ABG pO2   


 


ABG HCO3   


 


ABG O2 Saturation   


 


ABG Base Excess   


 


ABG Hemoglobin   


 


Oxyhemoglobin   


 


Sodium   


 


Potassium   


 


Chloride   


 


Carbon Dioxide   


 


Creatinine   0.5 L 


 


Glucose   


 


Calcium   


 


Total Bilirubin   


 


Alkaline Phosphatase   


 


NT-Pro-B Natriuret Pep   


 


Total Protein   


 


Albumin   


 


Urine WBC (Auto)   














  01/09/22





  13:11


 


WBC 


 


RBC 


 


Hgb 


 


Hct 


 


MCH 


 


MCHC 


 


RDW 


 


Lymph % (Auto) 


 


Mono % (Auto) 


 


Mono # (Auto) 


 


Seg Neutrophils % 


 


Seg Neuts % (Manual) 


 


Seg Neutrophils # 


 


PT 


 


INR 


 


APTT 


 


Heparin Anti-Xa Level 


 


ABG pO2 


 


ABG HCO3 


 


ABG O2 Saturation 


 


ABG Base Excess 


 


ABG Hemoglobin 


 


Oxyhemoglobin 


 


Sodium 


 


Potassium  5.4 H


 


Chloride 


 


Carbon Dioxide 


 


Creatinine 


 


Glucose  52 L


 


Calcium 


 


Total Bilirubin 


 


Alkaline Phosphatase 


 


NT-Pro-B Natriuret Pep 


 


Total Protein  5.7 L


 


Albumin  2.5 L


 


Urine WBC (Auto)
Assessment and Plan





77 y/o with acute respiratory failure and pneumonia along with DVT





1/11/22:  [facility] FIO for sats >88%.  WIll need ambulatory test prior to discharge.

 Abx duration per ID recs, see their consult note.





WEan FiO2 as tolerated


Abx therapy per ID recommendations





Subjective


Date of service: 01/11/22


Interval history: 





no acute events.  Believe that RT documentation may be wrong in regards to 

oxygen requirement.  Spoke with nursing and they are going to check a pulse ox 

again.





Objective


                               Vital Signs - 12hr











  01/10/22 01/10/22 01/10/22





  21:57 22:04 22:23


 


Temperature   


 


Pulse Rate   86


 


Pulse Rate [ 81  





Anterior   





Bilateral   





Throughout]   


 


Respiratory   





Rate   


 


Respiratory 16  





Rate [Anterior   





Bilateral   





Throughout]   


 


Blood Pressure   111/59


 


Blood Pressure   





[Right]   


 


O2 Sat by Pulse  99 





Oximetry   














  01/10/22 01/11/22 01/11/22





  23:59 03:18 04:05


 


Temperature 98.8 F  98.8 F


 


Pulse Rate 79  71


 


Pulse Rate [   





Anterior   





Bilateral   





Throughout]   


 


Respiratory 18  18





Rate   


 


Respiratory   





Rate [Anterior   





Bilateral   





Throughout]   


 


Blood Pressure   125/42


 


Blood Pressure 114/59  





[Right]   


 


O2 Sat by Pulse 100 96 100





Oximetry   














  01/11/22





  08:28


 


Temperature 


 


Pulse Rate 


 


Pulse Rate [ 





Anterior 





Bilateral 





Throughout] 


 


Respiratory 





Rate 


 


Respiratory 





Rate [Anterior 





Bilateral 





Throughout] 


 


Blood Pressure 


 


Blood Pressure 





[Right] 


 


O2 Sat by Pulse 97





Oximetry 











Constitutional: no acute distress, alert


ENT: oropharynx moist


Neck: supple


Ascultation: Bilateral: rhonchi


Cardiovascular: regular rate and rhythm


Gastrointestinal: normoactive bowel sounds, non-distended


Integumentary: normal


Extremities: no cyanosis


Psychiatric: mood appropriate


CBC and BMP: 


                                 01/10/22 15:06





                                 01/10/22 15:06


ABG, PT/INR, D-dimer: 


ABG











ABG pH  7.431 pH Units (7.350-7.450)   01/06/22  12:30    


 


ABG pCO2  49.5 mm Hg  01/06/22  12:30    


 


ABG pO2  25.2 mm Hg (80.0-90.0)  L*  01/06/22  12:30    


 


ABG O2 Saturation  41.1 % (95.0-99.0)  L  01/06/22  12:30    





PT/INR, D-dimer











PT  14.5 Sec. (12.2-14.9)   01/05/22  14:48    


 


INR  1.02  (0.87-1.13)   01/05/22  14:48    








Abnormal lab findings: 


                                  Abnormal Labs











  12/30/21 12/30/21 12/31/21





  23:51 23:51 01:00


 


WBC   


 


RBC   


 


Hgb  11.4 L  


 


Hct  35.3 L  


 


MCH  27 L  


 


MCHC   


 


RDW  19.9 H  


 


Lymph % (Auto)  11.4 L  


 


Mono % (Auto)  13.3 H  


 


Mono # (Auto)  1.4 H  


 


Seg Neutrophils %  75.2 H  


 


Seg Neuts % (Manual)   


 


Seg Neutrophils #  8.0 H  


 


PT   


 


INR   


 


APTT   


 


Heparin Anti-Xa Level   


 


ABG pO2   


 


ABG HCO3   


 


ABG O2 Saturation   


 


ABG Base Excess   


 


ABG Hemoglobin   


 


Oxyhemoglobin   


 


Sodium   127 L 


 


Potassium   2.0 L* 


 


Chloride   86.1 L 


 


Carbon Dioxide   


 


Creatinine   


 


Glucose   124 H 


 


Calcium   8.1 L 


 


Total Bilirubin   1.40 H 


 


Alkaline Phosphatase   141 H 


 


NT-Pro-B Natriuret Pep   1300 H 


 


Total Protein   5.9 L 


 


Albumin   2.5 L 


 


Urine WBC (Auto)    9.0 H














  01/01/22 01/01/22 01/03/22





  11:15 11:15 00:06


 


WBC   


 


RBC   


 


Hgb  11.2 L  


 


Hct  35.4 L  


 


MCH   


 


MCHC   


 


RDW  20.6 H  


 


Lymph % (Auto)   


 


Mono % (Auto)   


 


Mono # (Auto)   


 


Seg Neutrophils %   


 


Seg Neuts % (Manual)  72.0 H  


 


Seg Neutrophils #   


 


PT   


 


INR   


 


APTT   


 


Heparin Anti-Xa Level   


 


ABG pO2   


 


ABG HCO3   


 


ABG O2 Saturation   


 


ABG Base Excess   


 


ABG Hemoglobin   


 


Oxyhemoglobin   


 


Sodium   129 L  132 L


 


Potassium   3.3 L D  3.4 L


 


Chloride   93.5 L  95.1 L


 


Carbon Dioxide    21 L


 


Creatinine   


 


Glucose    109 H


 


Calcium   7.9 L  8.1 L


 


Total Bilirubin   


 


Alkaline Phosphatase   


 


NT-Pro-B Natriuret Pep   


 


Total Protein   


 


Albumin   


 


Urine WBC (Auto)   














  01/03/22 01/03/22 01/04/22





  20:38 20:38 00:41


 


WBC   


 


RBC   


 


Hgb  9.0 L  


 


Hct  28.8 L D  


 


MCH   


 


MCHC   


 


RDW   


 


Lymph % (Auto)   


 


Mono % (Auto)   


 


Mono # (Auto)   


 


Seg Neutrophils %   


 


Seg Neuts % (Manual)   


 


Seg Neutrophils #   


 


PT   16.5 H 


 


INR   1.20 H 


 


APTT   67.8 H* 


 


Heparin Anti-Xa Level    0.21 L


 


ABG pO2   


 


ABG HCO3   


 


ABG O2 Saturation   


 


ABG Base Excess   


 


ABG Hemoglobin   


 


Oxyhemoglobin   


 


Sodium   


 


Potassium   


 


Chloride   


 


Carbon Dioxide   


 


Creatinine   


 


Glucose   


 


Calcium   


 


Total Bilirubin   


 


Alkaline Phosphatase   


 


NT-Pro-B Natriuret Pep   


 


Total Protein   


 


Albumin   


 


Urine WBC (Auto)   














  01/04/22 01/04/22 01/04/22





  04:48 10:32 13:35


 


WBC   


 


RBC    3.38 L


 


Hgb    9.5 L


 


Hct    28.8 L


 


MCH   


 


MCHC   


 


RDW    20.8 H


 


Lymph % (Auto)   


 


Mono % (Auto)   


 


Mono # (Auto)   


 


Seg Neutrophils %   


 


Seg Neuts % (Manual)   


 


Seg Neutrophils #   


 


PT   15.7 H 


 


INR   


 


APTT   135.0 H* 


 


Heparin Anti-Xa Level   


 


ABG pO2   


 


ABG HCO3   


 


ABG O2 Saturation   


 


ABG Base Excess   


 


ABG Hemoglobin   


 


Oxyhemoglobin   


 


Sodium   


 


Potassium  3.4 L  


 


Chloride   


 


Carbon Dioxide   


 


Creatinine  0.7 L  


 


Glucose   


 


Calcium  7.9 L  


 


Total Bilirubin   


 


Alkaline Phosphatase   


 


NT-Pro-B Natriuret Pep   


 


Total Protein   


 


Albumin   


 


Urine WBC (Auto)   














  01/04/22 01/04/22 01/05/22





  13:35 13:49 02:23


 


WBC   


 


RBC   


 


Hgb    9.4 L


 


Hct    29.3 L


 


MCH   


 


MCHC   


 


RDW   


 


Lymph % (Auto)   


 


Mono % (Auto)   


 


Mono # (Auto)   


 


Seg Neutrophils %   


 


Seg Neuts % (Manual)   


 


Seg Neutrophils #   


 


PT   


 


INR   


 


APTT   171.6 H* 


 


Heparin Anti-Xa Level   


 


ABG pO2   


 


ABG HCO3   


 


ABG O2 Saturation   


 


ABG Base Excess   


 


ABG Hemoglobin   


 


Oxyhemoglobin   


 


Sodium   


 


Potassium   


 


Chloride   


 


Carbon Dioxide   


 


Creatinine  0.6 L  


 


Glucose   


 


Calcium   


 


Total Bilirubin   


 


Alkaline Phosphatase   


 


NT-Pro-B Natriuret Pep   


 


Total Protein   


 


Albumin   


 


Urine WBC (Auto)   














  01/05/22 01/05/22 01/05/22





  02:23 14:48 14:48


 


WBC   


 


RBC   


 


Hgb   


 


Hct   


 


MCH   


 


MCHC   


 


RDW   


 


Lymph % (Auto)   


 


Mono % (Auto)   


 


Mono # (Auto)   


 


Seg Neutrophils %   


 


Seg Neuts % (Manual)   


 


Seg Neutrophils #   


 


PT   


 


INR   


 


APTT   


 


Heparin Anti-Xa Level  < 0.10 L  < 0.10 L 


 


ABG pO2   


 


ABG HCO3   


 


ABG O2 Saturation   


 


ABG Base Excess   


 


ABG Hemoglobin   


 


Oxyhemoglobin   


 


Sodium    136 L


 


Potassium   


 


Chloride    97.2 L


 


Carbon Dioxide   


 


Creatinine    0.6 L


 


Glucose   


 


Calcium   


 


Total Bilirubin   


 


Alkaline Phosphatase   


 


NT-Pro-B Natriuret Pep   


 


Total Protein   


 


Albumin   


 


Urine WBC (Auto)   














  01/05/22 01/05/22 01/06/22





  14:48 Unknown 12:30


 


WBC   


 


RBC   


 


Hgb   11.0 L 


 


Hct   34.7 L 


 


MCH   


 


MCHC   


 


RDW   21.0 H 


 


Lymph % (Auto)   


 


Mono % (Auto)   


 


Mono # (Auto)   


 


Seg Neutrophils %   


 


Seg Neuts % (Manual)   


 


Seg Neutrophils #   


 


PT   


 


INR   


 


APTT   


 


Heparin Anti-Xa Level   


 


ABG pO2    25.2 L*


 


ABG HCO3    32.2 H


 


ABG O2 Saturation    41.1 L


 


ABG Base Excess    6.6 H


 


ABG Hemoglobin    10.2 L


 


Oxyhemoglobin    40.1 L


 


Sodium   


 


Potassium   


 


Chloride   


 


Carbon Dioxide   


 


Creatinine  0.6 L  


 


Glucose   


 


Calcium   


 


Total Bilirubin   


 


Alkaline Phosphatase   


 


NT-Pro-B Natriuret Pep   


 


Total Protein   


 


Albumin   


 


Urine WBC (Auto)   














  01/06/22 01/07/22 01/07/22





  13:22 10:07 10:07


 


WBC  19.9 H  16.7 H 


 


RBC   


 


Hgb  10.8 L  10.7 L 


 


Hct  34.6 L  34.7 L 


 


MCH   27 L 


 


MCHC  31 L  31 L 


 


RDW  21.1 H  20.8 H 


 


Lymph % (Auto)   


 


Mono % (Auto)   


 


Mono # (Auto)   


 


Seg Neutrophils %   


 


Seg Neuts % (Manual)   


 


Seg Neutrophils #   


 


PT   


 


INR   


 


APTT   


 


Heparin Anti-Xa Level   


 


ABG pO2   


 


ABG HCO3   


 


ABG O2 Saturation   


 


ABG Base Excess   


 


ABG Hemoglobin   


 


Oxyhemoglobin   


 


Sodium   


 


Potassium   


 


Chloride   


 


Carbon Dioxide   


 


Creatinine    0.6 L


 


Glucose   


 


Calcium   


 


Total Bilirubin   


 


Alkaline Phosphatase   


 


NT-Pro-B Natriuret Pep   


 


Total Protein   


 


Albumin   


 


Urine WBC (Auto)   














  01/08/22 01/08/22 01/09/22





  08:28 08:28 07:41


 


WBC  14.2 H  


 


RBC  3.40 L   3.56 L


 


Hgb  9.4 L   10.0 L


 


Hct  30.3 L   31.7 L


 


MCH   


 


MCHC  31 L  


 


RDW  21.4 H   20.7 H


 


Lymph % (Auto)   


 


Mono % (Auto)   


 


Mono # (Auto)   


 


Seg Neutrophils %   


 


Seg Neuts % (Manual)   


 


Seg Neutrophils #   


 


PT   


 


INR   


 


APTT   


 


Heparin Anti-Xa Level   


 


ABG pO2   


 


ABG HCO3   


 


ABG O2 Saturation   


 


ABG Base Excess   


 


ABG Hemoglobin   


 


Oxyhemoglobin   


 


Sodium   


 


Potassium   


 


Chloride   


 


Carbon Dioxide   


 


Creatinine   0.5 L 


 


Glucose   


 


Calcium   


 


Total Bilirubin   


 


Alkaline Phosphatase   


 


NT-Pro-B Natriuret Pep   


 


Total Protein   


 


Albumin   


 


Urine WBC (Auto)   














  01/09/22 01/10/22 01/10/22





  13:11 15:06 15:06


 


WBC   


 


RBC   3.17 L 


 


Hgb   8.6 L 


 


Hct   27.4 L 


 


MCH   27 L 


 


MCHC   31 L 


 


RDW   20.5 H 


 


Lymph % (Auto)   


 


Mono % (Auto)   12.3 H 


 


Mono # (Auto)   1.0 H 


 


Seg Neutrophils %   


 


Seg Neuts % (Manual)   


 


Seg Neutrophils #   


 


PT   


 


INR   


 


APTT   


 


Heparin Anti-Xa Level   


 


ABG pO2   


 


ABG HCO3   


 


ABG O2 Saturation   


 


ABG Base Excess   


 


ABG Hemoglobin   


 


Oxyhemoglobin   


 


Sodium    147 H


 


Potassium  5.4 H  


 


Chloride   


 


Carbon Dioxide   


 


Creatinine    0.7 L


 


Glucose  52 L   107 H


 


Calcium   


 


Total Bilirubin   


 


Alkaline Phosphatase   


 


NT-Pro-B Natriuret Pep   


 


Total Protein  5.7 L  


 


Albumin  2.5 L  


 


Urine WBC (Auto)
Assessment and Plan





77 y/o with acute respiratory failure and pneumonia along with DVT





1/12/22: Continue to wean FiO2 as tolerated.  If able ambulatry pulse ox prior 

to discharge.  Abx for 5 days per  ID consult note.





1/11/22:  Wean FIO for sats >88%.  WIll need ambulatory test prior to discharge.

 Abx duration per ID recs, see their consult note.





WEan FiO2 as tolerated


Abx therapy per ID recommendations





Subjective


Date of service: 01/12/22


Interval history: 





Down to 2 liters now with good sats.





Objective


                               Vital Signs - 12hr











  01/12/22 01/12/22 01/12/22





  03:00 03:37 08:27


 


Temperature  97.8 F 98.0 F


 


Pulse Rate 77 77 72


 


Respiratory  22 16





Rate   


 


Blood Pressure  139/64 124/43


 


O2 Sat by Pulse 97 93 100





Oximetry   











Constitutional: no acute distress, alert


ENT: oropharynx moist


Neck: supple


Ascultation: Bilateral: rhonchi


Cardiovascular: regular rate and rhythm


Gastrointestinal: normoactive bowel sounds, non-distended


Integumentary: normal


Extremities: no cyanosis


Psychiatric: mood appropriate


CBC and BMP: 


                                 01/12/22 07:53





                                 01/11/22 15:24


ABG, PT/INR, D-dimer: 


ABG











ABG pH  7.431 pH Units (7.350-7.450)   01/06/22  12:30    


 


ABG pCO2  49.5 mm Hg  01/06/22  12:30    


 


ABG pO2  25.2 mm Hg (80.0-90.0)  L*  01/06/22  12:30    


 


ABG O2 Saturation  41.1 % (95.0-99.0)  L  01/06/22  12:30    





PT/INR, D-dimer











PT  14.5 Sec. (12.2-14.9)   01/05/22  14:48    


 


INR  1.02  (0.87-1.13)   01/05/22  14:48    








Abnormal lab findings: 


                                  Abnormal Labs











  12/30/21 12/30/21 12/31/21





  23:51 23:51 01:00


 


WBC   


 


RBC   


 


Hgb  11.4 L  


 


Hct  35.3 L  


 


MCH  27 L  


 


MCHC   


 


RDW  19.9 H  


 


Lymph % (Auto)  11.4 L  


 


Mono % (Auto)  13.3 H  


 


Mono # (Auto)  1.4 H  


 


Seg Neutrophils %  75.2 H  


 


Seg Neuts % (Manual)   


 


Seg Neutrophils #  8.0 H  


 


PT   


 


INR   


 


APTT   


 


Heparin Anti-Xa Level   


 


ABG pO2   


 


ABG HCO3   


 


ABG O2 Saturation   


 


ABG Base Excess   


 


ABG Hemoglobin   


 


Oxyhemoglobin   


 


Sodium   127 L 


 


Potassium   2.0 L* 


 


Chloride   86.1 L 


 


Carbon Dioxide   


 


Creatinine   


 


Glucose   124 H 


 


Calcium   8.1 L 


 


Total Bilirubin   1.40 H 


 


Alkaline Phosphatase   141 H 


 


NT-Pro-B Natriuret Pep   1300 H 


 


Total Protein   5.9 L 


 


Albumin   2.5 L 


 


Urine WBC (Auto)    9.0 H














  01/01/22 01/01/22 01/03/22





  11:15 11:15 00:06


 


WBC   


 


RBC   


 


Hgb  11.2 L  


 


Hct  35.4 L  


 


MCH   


 


MCHC   


 


RDW  20.6 H  


 


Lymph % (Auto)   


 


Mono % (Auto)   


 


Mono # (Auto)   


 


Seg Neutrophils %   


 


Seg Neuts % (Manual)  72.0 H  


 


Seg Neutrophils #   


 


PT   


 


INR   


 


APTT   


 


Heparin Anti-Xa Level   


 


ABG pO2   


 


ABG HCO3   


 


ABG O2 Saturation   


 


ABG Base Excess   


 


ABG Hemoglobin   


 


Oxyhemoglobin   


 


Sodium   129 L  132 L


 


Potassium   3.3 L D  3.4 L


 


Chloride   93.5 L  95.1 L


 


Carbon Dioxide    21 L


 


Creatinine   


 


Glucose    109 H


 


Calcium   7.9 L  8.1 L


 


Total Bilirubin   


 


Alkaline Phosphatase   


 


NT-Pro-B Natriuret Pep   


 


Total Protein   


 


Albumin   


 


Urine WBC (Auto)   














  01/03/22 01/03/22 01/04/22





  20:38 20:38 00:41


 


WBC   


 


RBC   


 


Hgb  9.0 L  


 


Hct  28.8 L D  


 


MCH   


 


MCHC   


 


RDW   


 


Lymph % (Auto)   


 


Mono % (Auto)   


 


Mono # (Auto)   


 


Seg Neutrophils %   


 


Seg Neuts % (Manual)   


 


Seg Neutrophils #   


 


PT   16.5 H 


 


INR   1.20 H 


 


APTT   67.8 H* 


 


Heparin Anti-Xa Level    0.21 L


 


ABG pO2   


 


ABG HCO3   


 


ABG O2 Saturation   


 


ABG Base Excess   


 


ABG Hemoglobin   


 


Oxyhemoglobin   


 


Sodium   


 


Potassium   


 


Chloride   


 


Carbon Dioxide   


 


Creatinine   


 


Glucose   


 


Calcium   


 


Total Bilirubin   


 


Alkaline Phosphatase   


 


NT-Pro-B Natriuret Pep   


 


Total Protein   


 


Albumin   


 


Urine WBC (Auto)   














  01/04/22 01/04/22 01/04/22





  04:48 10:32 13:35


 


WBC   


 


RBC    3.38 L


 


Hgb    9.5 L


 


Hct    28.8 L


 


MCH   


 


MCHC   


 


RDW    20.8 H


 


Lymph % (Auto)   


 


Mono % (Auto)   


 


Mono # (Auto)   


 


Seg Neutrophils %   


 


Seg Neuts % (Manual)   


 


Seg Neutrophils #   


 


PT   15.7 H 


 


INR   


 


APTT   135.0 H* 


 


Heparin Anti-Xa Level   


 


ABG pO2   


 


ABG HCO3   


 


ABG O2 Saturation   


 


ABG Base Excess   


 


ABG Hemoglobin   


 


Oxyhemoglobin   


 


Sodium   


 


Potassium  3.4 L  


 


Chloride   


 


Carbon Dioxide   


 


Creatinine  0.7 L  


 


Glucose   


 


Calcium  7.9 L  


 


Total Bilirubin   


 


Alkaline Phosphatase   


 


NT-Pro-B Natriuret Pep   


 


Total Protein   


 


Albumin   


 


Urine WBC (Auto)   














  01/04/22 01/04/22 01/05/22





  13:35 13:49 02:23


 


WBC   


 


RBC   


 


Hgb    9.4 L


 


Hct    29.3 L


 


MCH   


 


MCHC   


 


RDW   


 


Lymph % (Auto)   


 


Mono % (Auto)   


 


Mono # (Auto)   


 


Seg Neutrophils %   


 


Seg Neuts % (Manual)   


 


Seg Neutrophils #   


 


PT   


 


INR   


 


APTT   171.6 H* 


 


Heparin Anti-Xa Level   


 


ABG pO2   


 


ABG HCO3   


 


ABG O2 Saturation   


 


ABG Base Excess   


 


ABG Hemoglobin   


 


Oxyhemoglobin   


 


Sodium   


 


Potassium   


 


Chloride   


 


Carbon Dioxide   


 


Creatinine  0.6 L  


 


Glucose   


 


Calcium   


 


Total Bilirubin   


 


Alkaline Phosphatase   


 


NT-Pro-B Natriuret Pep   


 


Total Protein   


 


Albumin   


 


Urine WBC (Auto)   














  01/05/22 01/05/22 01/05/22





  02:23 14:48 14:48


 


WBC   


 


RBC   


 


Hgb   


 


Hct   


 


MCH   


 


MCHC   


 


RDW   


 


Lymph % (Auto)   


 


Mono % (Auto)   


 


Mono # (Auto)   


 


Seg Neutrophils %   


 


Seg Neuts % (Manual)   


 


Seg Neutrophils #   


 


PT   


 


INR   


 


APTT   


 


Heparin Anti-Xa Level  < 0.10 L  < 0.10 L 


 


ABG pO2   


 


ABG HCO3   


 


ABG O2 Saturation   


 


ABG Base Excess   


 


ABG Hemoglobin   


 


Oxyhemoglobin   


 


Sodium    136 L


 


Potassium   


 


Chloride    97.2 L


 


Carbon Dioxide   


 


Creatinine    0.6 L


 


Glucose   


 


Calcium   


 


Total Bilirubin   


 


Alkaline Phosphatase   


 


NT-Pro-B Natriuret Pep   


 


Total Protein   


 


Albumin   


 


Urine WBC (Auto)   














  01/05/22 01/05/22 01/06/22





  14:48 Unknown 12:30


 


WBC   


 


RBC   


 


Hgb   11.0 L 


 


Hct   34.7 L 


 


MCH   


 


MCHC   


 


RDW   21.0 H 


 


Lymph % (Auto)   


 


Mono % (Auto)   


 


Mono # (Auto)   


 


Seg Neutrophils %   


 


Seg Neuts % (Manual)   


 


Seg Neutrophils #   


 


PT   


 


INR   


 


APTT   


 


Heparin Anti-Xa Level   


 


ABG pO2    25.2 L*


 


ABG HCO3    32.2 H


 


ABG O2 Saturation    41.1 L


 


ABG Base Excess    6.6 H


 


ABG Hemoglobin    10.2 L


 


Oxyhemoglobin    40.1 L


 


Sodium   


 


Potassium   


 


Chloride   


 


Carbon Dioxide   


 


Creatinine  0.6 L  


 


Glucose   


 


Calcium   


 


Total Bilirubin   


 


Alkaline Phosphatase   


 


NT-Pro-B Natriuret Pep   


 


Total Protein   


 


Albumin   


 


Urine WBC (Auto)   














  01/06/22 01/07/22 01/07/22





  13:22 10:07 10:07


 


WBC  19.9 H  16.7 H 


 


RBC   


 


Hgb  10.8 L  10.7 L 


 


Hct  34.6 L  34.7 L 


 


MCH   27 L 


 


MCHC  31 L  31 L 


 


RDW  21.1 H  20.8 H 


 


Lymph % (Auto)   


 


Mono % (Auto)   


 


Mono # (Auto)   


 


Seg Neutrophils %   


 


Seg Neuts % (Manual)   


 


Seg Neutrophils #   


 


PT   


 


INR   


 


APTT   


 


Heparin Anti-Xa Level   


 


ABG pO2   


 


ABG HCO3   


 


ABG O2 Saturation   


 


ABG Base Excess   


 


ABG Hemoglobin   


 


Oxyhemoglobin   


 


Sodium   


 


Potassium   


 


Chloride   


 


Carbon Dioxide   


 


Creatinine    0.6 L


 


Glucose   


 


Calcium   


 


Total Bilirubin   


 


Alkaline Phosphatase   


 


NT-Pro-B Natriuret Pep   


 


Total Protein   


 


Albumin   


 


Urine WBC (Auto)   














  01/08/22 01/08/22 01/09/22





  08:28 08:28 07:41


 


WBC  14.2 H  


 


RBC  3.40 L   3.56 L


 


Hgb  9.4 L   10.0 L


 


Hct  30.3 L   31.7 L


 


MCH   


 


MCHC  31 L  


 


RDW  21.4 H   20.7 H


 


Lymph % (Auto)   


 


Mono % (Auto)   


 


Mono # (Auto)   


 


Seg Neutrophils %   


 


Seg Neuts % (Manual)   


 


Seg Neutrophils #   


 


PT   


 


INR   


 


APTT   


 


Heparin Anti-Xa Level   


 


ABG pO2   


 


ABG HCO3   


 


ABG O2 Saturation   


 


ABG Base Excess   


 


ABG Hemoglobin   


 


Oxyhemoglobin   


 


Sodium   


 


Potassium   


 


Chloride   


 


Carbon Dioxide   


 


Creatinine   0.5 L 


 


Glucose   


 


Calcium   


 


Total Bilirubin   


 


Alkaline Phosphatase   


 


NT-Pro-B Natriuret Pep   


 


Total Protein   


 


Albumin   


 


Urine WBC (Auto)   














  01/09/22 01/10/22 01/10/22





  13:11 15:06 15:06


 


WBC   


 


RBC   3.17 L 


 


Hgb   8.6 L 


 


Hct   27.4 L 


 


MCH   27 L 


 


MCHC   31 L 


 


RDW   20.5 H 


 


Lymph % (Auto)   


 


Mono % (Auto)   12.3 H 


 


Mono # (Auto)   1.0 H 


 


Seg Neutrophils %   


 


Seg Neuts % (Manual)   


 


Seg Neutrophils #   


 


PT   


 


INR   


 


APTT   


 


Heparin Anti-Xa Level   


 


ABG pO2   


 


ABG HCO3   


 


ABG O2 Saturation   


 


ABG Base Excess   


 


ABG Hemoglobin   


 


Oxyhemoglobin   


 


Sodium    147 H


 


Potassium  5.4 H  


 


Chloride   


 


Carbon Dioxide   


 


Creatinine    0.7 L


 


Glucose  52 L   107 H


 


Calcium   


 


Total Bilirubin   


 


Alkaline Phosphatase   


 


NT-Pro-B Natriuret Pep   


 


Total Protein  5.7 L  


 


Albumin  2.5 L  


 


Urine WBC (Auto)   














  01/11/22 01/12/22





  15:24 07:53


 


WBC  


 


RBC  2.97 L 


 


Hgb  8.3 L  9.1 L


 


Hct  26.0 L  29.1 L


 


MCH  


 


MCHC  


 


RDW  19.9 H 


 


Lymph % (Auto)  


 


Mono % (Auto)  


 


Mono # (Auto)  


 


Seg Neutrophils %  


 


Seg Neuts % (Manual)  


 


Seg Neutrophils #  


 


PT  


 


INR  


 


APTT  


 


Heparin Anti-Xa Level  


 


ABG pO2  


 


ABG HCO3  


 


ABG O2 Saturation  


 


ABG Base Excess  


 


ABG Hemoglobin  


 


Oxyhemoglobin  


 


Sodium  


 


Potassium  


 


Chloride  


 


Carbon Dioxide  


 


Creatinine  


 


Glucose  


 


Calcium  


 


Total Bilirubin  


 


Alkaline Phosphatase  


 


NT-Pro-B Natriuret Pep  


 


Total Protein  


 


Albumin  


 


Urine WBC (Auto)
Assessment and Plan





77 y/o with acute respiratory failure and pneumonia along with DVT





1/13/22:  Continue to wean FiO2 as tolerated.  Per ID note, abx for 5 days.  If 

able, will need ambulatory pulse ox.





1/12/22: Continue to wean FiO2 as tolerated.  If able ambulatry pulse ox prior 

to discharge.  Abx for 5 days per  ID consult note.





1/11/22:  Wean FIO for sats >88%.  WIll need ambulatory test prior to discharge.

 Abx duration per ID recs, see their consult note.





WEan FiO2 as tolerated


Abx therapy per ID recommendations





Subjective


Date of service: 01/13/22


Interval history: 





on 2 liters with good sats.  





Objective


                               Vital Signs - 12hr











  01/13/22 01/13/22 01/13/22





  03:00 03:38 08:00


 


Temperature  97.9 F 


 


Pulse Rate  60 


 


Pulse Rate [   73





Anterior   





Bilateral   





Throughout]   


 


Respiratory  18 





Rate   


 


Respiratory   18





Rate [Anterior   





Bilateral   





Throughout]   


 


Blood Pressure  117/54 


 


O2 Sat by Pulse 100 100 





Oximetry   














  01/13/22 01/13/22 01/13/22





  08:38 09:00 11:34


 


Temperature 98.2 F  


 


Pulse Rate 63  


 


Pulse Rate [   





Anterior   





Bilateral   





Throughout]   


 


Respiratory 23  





Rate   


 


Respiratory   





Rate [Anterior   





Bilateral   





Throughout]   


 


Blood Pressure 129/49  


 


O2 Sat by Pulse 98 99 99





Oximetry   











Constitutional: no acute distress, alert


ENT: oropharynx moist


Neck: supple


Ascultation: Bilateral: rhonchi


Cardiovascular: regular rate and rhythm


Gastrointestinal: normoactive bowel sounds, non-distended


Integumentary: normal


Extremities: no cyanosis


Psychiatric: mood appropriate


CBC and BMP: 


                                 01/12/22 07:53





                                 01/11/22 15:24


ABG, PT/INR, D-dimer: 


ABG











ABG pH  7.431 pH Units (7.350-7.450)   01/06/22  12:30    


 


ABG pCO2  49.5 mm Hg  01/06/22  12:30    


 


ABG pO2  25.2 mm Hg (80.0-90.0)  L*  01/06/22  12:30    


 


ABG O2 Saturation  41.1 % (95.0-99.0)  L  01/06/22  12:30    





PT/INR, D-dimer











PT  14.5 Sec. (12.2-14.9)   01/05/22  14:48    


 


INR  1.02  (0.87-1.13)   01/05/22  14:48    








Abnormal lab findings: 


                                  Abnormal Labs











  12/30/21 12/30/21 12/31/21





  23:51 23:51 01:00


 


WBC   


 


RBC   


 


Hgb  11.4 L  


 


Hct  35.3 L  


 


MCH  27 L  


 


MCHC   


 


RDW  19.9 H  


 


Lymph % (Auto)  11.4 L  


 


Mono % (Auto)  13.3 H  


 


Mono # (Auto)  1.4 H  


 


Seg Neutrophils %  75.2 H  


 


Seg Neuts % (Manual)   


 


Seg Neutrophils #  8.0 H  


 


PT   


 


INR   


 


APTT   


 


Heparin Anti-Xa Level   


 


ABG pO2   


 


ABG HCO3   


 


ABG O2 Saturation   


 


ABG Base Excess   


 


ABG Hemoglobin   


 


Oxyhemoglobin   


 


Sodium   127 L 


 


Potassium   2.0 L* 


 


Chloride   86.1 L 


 


Carbon Dioxide   


 


Creatinine   


 


Glucose   124 H 


 


Calcium   8.1 L 


 


Total Bilirubin   1.40 H 


 


Alkaline Phosphatase   141 H 


 


NT-Pro-B Natriuret Pep   1300 H 


 


Total Protein   5.9 L 


 


Albumin   2.5 L 


 


Urine WBC (Auto)    9.0 H














  01/01/22 01/01/22 01/03/22





  11:15 11:15 00:06


 


WBC   


 


RBC   


 


Hgb  11.2 L  


 


Hct  35.4 L  


 


MCH   


 


MCHC   


 


RDW  20.6 H  


 


Lymph % (Auto)   


 


Mono % (Auto)   


 


Mono # (Auto)   


 


Seg Neutrophils %   


 


Seg Neuts % (Manual)  72.0 H  


 


Seg Neutrophils #   


 


PT   


 


INR   


 


APTT   


 


Heparin Anti-Xa Level   


 


ABG pO2   


 


ABG HCO3   


 


ABG O2 Saturation   


 


ABG Base Excess   


 


ABG Hemoglobin   


 


Oxyhemoglobin   


 


Sodium   129 L  132 L


 


Potassium   3.3 L D  3.4 L


 


Chloride   93.5 L  95.1 L


 


Carbon Dioxide    21 L


 


Creatinine   


 


Glucose    109 H


 


Calcium   7.9 L  8.1 L


 


Total Bilirubin   


 


Alkaline Phosphatase   


 


NT-Pro-B Natriuret Pep   


 


Total Protein   


 


Albumin   


 


Urine WBC (Auto)   














  01/03/22 01/03/22 01/04/22





  20:38 20:38 00:41


 


WBC   


 


RBC   


 


Hgb  9.0 L  


 


Hct  28.8 L D  


 


MCH   


 


MCHC   


 


RDW   


 


Lymph % (Auto)   


 


Mono % (Auto)   


 


Mono # (Auto)   


 


Seg Neutrophils %   


 


Seg Neuts % (Manual)   


 


Seg Neutrophils #   


 


PT   16.5 H 


 


INR   1.20 H 


 


APTT   67.8 H* 


 


Heparin Anti-Xa Level    0.21 L


 


ABG pO2   


 


ABG HCO3   


 


ABG O2 Saturation   


 


ABG Base Excess   


 


ABG Hemoglobin   


 


Oxyhemoglobin   


 


Sodium   


 


Potassium   


 


Chloride   


 


Carbon Dioxide   


 


Creatinine   


 


Glucose   


 


Calcium   


 


Total Bilirubin   


 


Alkaline Phosphatase   


 


NT-Pro-B Natriuret Pep   


 


Total Protein   


 


Albumin   


 


Urine WBC (Auto)   














  01/04/22 01/04/22 01/04/22





  04:48 10:32 13:35


 


WBC   


 


RBC    3.38 L


 


Hgb    9.5 L


 


Hct    28.8 L


 


MCH   


 


MCHC   


 


RDW    20.8 H


 


Lymph % (Auto)   


 


Mono % (Auto)   


 


Mono # (Auto)   


 


Seg Neutrophils %   


 


Seg Neuts % (Manual)   


 


Seg Neutrophils #   


 


PT   15.7 H 


 


INR   


 


APTT   135.0 H* 


 


Heparin Anti-Xa Level   


 


ABG pO2   


 


ABG HCO3   


 


ABG O2 Saturation   


 


ABG Base Excess   


 


ABG Hemoglobin   


 


Oxyhemoglobin   


 


Sodium   


 


Potassium  3.4 L  


 


Chloride   


 


Carbon Dioxide   


 


Creatinine  0.7 L  


 


Glucose   


 


Calcium  7.9 L  


 


Total Bilirubin   


 


Alkaline Phosphatase   


 


NT-Pro-B Natriuret Pep   


 


Total Protein   


 


Albumin   


 


Urine WBC (Auto)   














  01/04/22 01/04/22 01/05/22





  13:35 13:49 02:23


 


WBC   


 


RBC   


 


Hgb    9.4 L


 


Hct    29.3 L


 


MCH   


 


MCHC   


 


RDW   


 


Lymph % (Auto)   


 


Mono % (Auto)   


 


Mono # (Auto)   


 


Seg Neutrophils %   


 


Seg Neuts % (Manual)   


 


Seg Neutrophils #   


 


PT   


 


INR   


 


APTT   171.6 H* 


 


Heparin Anti-Xa Level   


 


ABG pO2   


 


ABG HCO3   


 


ABG O2 Saturation   


 


ABG Base Excess   


 


ABG Hemoglobin   


 


Oxyhemoglobin   


 


Sodium   


 


Potassium   


 


Chloride   


 


Carbon Dioxide   


 


Creatinine  0.6 L  


 


Glucose   


 


Calcium   


 


Total Bilirubin   


 


Alkaline Phosphatase   


 


NT-Pro-B Natriuret Pep   


 


Total Protein   


 


Albumin   


 


Urine WBC (Auto)   














  01/05/22 01/05/22 01/05/22





  02:23 14:48 14:48


 


WBC   


 


RBC   


 


Hgb   


 


Hct   


 


MCH   


 


MCHC   


 


RDW   


 


Lymph % (Auto)   


 


Mono % (Auto)   


 


Mono # (Auto)   


 


Seg Neutrophils %   


 


Seg Neuts % (Manual)   


 


Seg Neutrophils #   


 


PT   


 


INR   


 


APTT   


 


Heparin Anti-Xa Level  < 0.10 L  < 0.10 L 


 


ABG pO2   


 


ABG HCO3   


 


ABG O2 Saturation   


 


ABG Base Excess   


 


ABG Hemoglobin   


 


Oxyhemoglobin   


 


Sodium    136 L


 


Potassium   


 


Chloride    97.2 L


 


Carbon Dioxide   


 


Creatinine    0.6 L


 


Glucose   


 


Calcium   


 


Total Bilirubin   


 


Alkaline Phosphatase   


 


NT-Pro-B Natriuret Pep   


 


Total Protein   


 


Albumin   


 


Urine WBC (Auto)   














  01/05/22 01/05/22 01/06/22





  14:48 Unknown 12:30


 


WBC   


 


RBC   


 


Hgb   11.0 L 


 


Hct   34.7 L 


 


MCH   


 


MCHC   


 


RDW   21.0 H 


 


Lymph % (Auto)   


 


Mono % (Auto)   


 


Mono # (Auto)   


 


Seg Neutrophils %   


 


Seg Neuts % (Manual)   


 


Seg Neutrophils #   


 


PT   


 


INR   


 


APTT   


 


Heparin Anti-Xa Level   


 


ABG pO2    25.2 L*


 


ABG HCO3    32.2 H


 


ABG O2 Saturation    41.1 L


 


ABG Base Excess    6.6 H


 


ABG Hemoglobin    10.2 L


 


Oxyhemoglobin    40.1 L


 


Sodium   


 


Potassium   


 


Chloride   


 


Carbon Dioxide   


 


Creatinine  0.6 L  


 


Glucose   


 


Calcium   


 


Total Bilirubin   


 


Alkaline Phosphatase   


 


NT-Pro-B Natriuret Pep   


 


Total Protein   


 


Albumin   


 


Urine WBC (Auto)   














  01/06/22 01/07/22 01/07/22





  13:22 10:07 10:07


 


WBC  19.9 H  16.7 H 


 


RBC   


 


Hgb  10.8 L  10.7 L 


 


Hct  34.6 L  34.7 L 


 


MCH   27 L 


 


MCHC  31 L  31 L 


 


RDW  21.1 H  20.8 H 


 


Lymph % (Auto)   


 


Mono % (Auto)   


 


Mono # (Auto)   


 


Seg Neutrophils %   


 


Seg Neuts % (Manual)   


 


Seg Neutrophils #   


 


PT   


 


INR   


 


APTT   


 


Heparin Anti-Xa Level   


 


ABG pO2   


 


ABG HCO3   


 


ABG O2 Saturation   


 


ABG Base Excess   


 


ABG Hemoglobin   


 


Oxyhemoglobin   


 


Sodium   


 


Potassium   


 


Chloride   


 


Carbon Dioxide   


 


Creatinine    0.6 L


 


Glucose   


 


Calcium   


 


Total Bilirubin   


 


Alkaline Phosphatase   


 


NT-Pro-B Natriuret Pep   


 


Total Protein   


 


Albumin   


 


Urine WBC (Auto)   














  01/08/22 01/08/22 01/09/22





  08:28 08:28 07:41


 


WBC  14.2 H  


 


RBC  3.40 L   3.56 L


 


Hgb  9.4 L   10.0 L


 


Hct  30.3 L   31.7 L


 


MCH   


 


MCHC  31 L  


 


RDW  21.4 H   20.7 H


 


Lymph % (Auto)   


 


Mono % (Auto)   


 


Mono # (Auto)   


 


Seg Neutrophils %   


 


Seg Neuts % (Manual)   


 


Seg Neutrophils #   


 


PT   


 


INR   


 


APTT   


 


Heparin Anti-Xa Level   


 


ABG pO2   


 


ABG HCO3   


 


ABG O2 Saturation   


 


ABG Base Excess   


 


ABG Hemoglobin   


 


Oxyhemoglobin   


 


Sodium   


 


Potassium   


 


Chloride   


 


Carbon Dioxide   


 


Creatinine   0.5 L 


 


Glucose   


 


Calcium   


 


Total Bilirubin   


 


Alkaline Phosphatase   


 


NT-Pro-B Natriuret Pep   


 


Total Protein   


 


Albumin   


 


Urine WBC (Auto)   














  01/09/22 01/10/22 01/10/22





  13:11 15:06 15:06


 


WBC   


 


RBC   3.17 L 


 


Hgb   8.6 L 


 


Hct   27.4 L 


 


MCH   27 L 


 


MCHC   31 L 


 


RDW   20.5 H 


 


Lymph % (Auto)   


 


Mono % (Auto)   12.3 H 


 


Mono # (Auto)   1.0 H 


 


Seg Neutrophils %   


 


Seg Neuts % (Manual)   


 


Seg Neutrophils #   


 


PT   


 


INR   


 


APTT   


 


Heparin Anti-Xa Level   


 


ABG pO2   


 


ABG HCO3   


 


ABG O2 Saturation   


 


ABG Base Excess   


 


ABG Hemoglobin   


 


Oxyhemoglobin   


 


Sodium    147 H


 


Potassium  5.4 H  


 


Chloride   


 


Carbon Dioxide   


 


Creatinine    0.7 L


 


Glucose  52 L   107 H


 


Calcium   


 


Total Bilirubin   


 


Alkaline Phosphatase   


 


NT-Pro-B Natriuret Pep   


 


Total Protein  5.7 L  


 


Albumin  2.5 L  


 


Urine WBC (Auto)   














  01/11/22 01/12/22





  15:24 07:53


 


WBC  


 


RBC  2.97 L 


 


Hgb  8.3 L  9.1 L


 


Hct  26.0 L  29.1 L


 


MCH  


 


MCHC  


 


RDW  19.9 H 


 


Lymph % (Auto)  


 


Mono % (Auto)  


 


Mono # (Auto)  


 


Seg Neutrophils %  


 


Seg Neuts % (Manual)  


 


Seg Neutrophils #  


 


PT  


 


INR  


 


APTT  


 


Heparin Anti-Xa Level  


 


ABG pO2  


 


ABG HCO3  


 


ABG O2 Saturation  


 


ABG Base Excess  


 


ABG Hemoglobin  


 


Oxyhemoglobin  


 


Sodium  


 


Potassium  


 


Chloride  


 


Carbon Dioxide  


 


Creatinine  


 


Glucose  


 


Calcium  


 


Total Bilirubin  


 


Alkaline Phosphatase  


 


NT-Pro-B Natriuret Pep  


 


Total Protein  


 


Albumin  


 


Urine WBC (Auto)
Assessment and Plan





Impression 


* Nonsustained VT in the setting of electrolyte abnormalities including 

  hypokalemia and hypomagnesemia.  No recurrence since last night.


* Nephrotic syndrome with volume overload.


* Severe electrolyte abnormalities


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


* Hypertension fairly well





Plan


Increase metoprolol to 25 twice daily for


Replete potassium and magnesium continue spironolactone


Continue Eliquis


Recent echocardiogram showed normal LV systolic function


No further cardiac work-up is recommended at this point.


Clearly asymptomatic nonsustained VT in the setting of hypokalemia and 

hypomagnesemia.  Ischemia work-up not warranted this admission.  We will 

consider outpatient ischemia work-up.




















Subjective


Date of service: 01/15/22


Principal diagnosis: hypokalemia


Interval history: 





No significant events overnight








Objective


                                   Vital Signs











  Temp Pulse Pulse Pulse Resp Resp BP


 


 01/15/22 08:48    74    18 


 


 01/15/22 08:47       


 


 01/15/22 08:11  98.7 F  76    20   153/82


 


 01/15/22 03:28  97.1 F L  72    18   141/74


 


 01/14/22 23:12       


 


 01/14/22 23:00  97.2 F L  76    18   146/69


 


 01/14/22 22:00   79     


 


 01/14/22 20:13       


 


 01/14/22 19:51    81    16 


 


 01/14/22 19:50  98.2 F  74    18   121/53


 


 01/14/22 15:40  97.3 F L  76    18   112/52


 


 01/14/22 15:07    68    18 


 


 01/14/22 11:00     71  18  














  Pulse Ox


 


 01/15/22 08:48 


 


 01/15/22 08:47  100


 


 01/15/22 08:11  91


 


 01/15/22 03:28  77 L


 


 01/14/22 23:12  96


 


 01/14/22 23:00  97


 


 01/14/22 22:00 


 


 01/14/22 20:13  97


 


 01/14/22 19:51 


 


 01/14/22 19:50  96


 


 01/14/22 15:40  94


 


 01/14/22 15:07 


 


 01/14/22 11:00  95














- Physical Examination


General: No Apparent Distress, Cachectic, Other (Patient is frail, elderly, 

poorly communicative)


HEENT: Positive: Normocephaly


Neck: Positive: neck supple, JVD/HJR


Cardiac: Positive: Reg Rate and Rhythm


Lungs: Positive: clear to auscultation


Neuro: Positive: Weakness (Generalized lethargy)


Abdomen: Positive: Unremarkable, Soft, Other (NO ASCITES OR TENDER)


Skin: Positive: Clear


Extremities: Present: edema, +1 Edema
Assessment and Plan


Assessment and plan: 








76-year-old  male with history of hypertension CVA and right hip fracture who is

presenting with leg swelling.





Sepsis.  Not present on admission 





anasarca





Left DVT





NSVT





Acute hypoxic respiratory failure





Hypokalemia





CVA





Hypertension





Hyponatremia





Nephrotic syndrome





Hospital Course


12/31: Follow up echo. Will hold off on lasix at this time due to low K. Replete

K with KCL IV (ordered 8 bags). May need albumin/lasix per nephrology. Follow up

diagnostic studies and cardiology recommendations.





01/01/22: Mild diastolic dysfunction, LVEF 55-65% per Echo read. Continue 

diuresis with spironalactone. Strict I/O's. May consider albumin/lasix if 

diuresis not adequate. Will continue to follow nephrology and cardiology 

recommendations. Anticipate dc in next 24-48 hrs. 





01/02/22: administered albumin + lasix 40 IV x 1.





01/03/22: albumin lasix ordered. US of lower extremity demonstrates DVT in 

popliteal vein. Initiated on heparin gtt.





1/4/2022: Transition from heparin drip to oral anticoagulation if okay with 

nephrology.  Hypokalemia improved.  Nephrology following.  At this time urinalys

is does not show any evidence of blood only 30 mg of protein.  Albumin however 

is 2.5 which is very concerning rule out other causes for malnutrition/?  Liver 

disease, bilirubin noted to be elevated





1/5/2022: We will transition from heparin drip to Eliquis.  Nephrology to check 

magnesium and BMP.  Nurse reports patient has very poor p.o. intake.  Will 

consult dietitian for further evaluation.





1/5/2022:  Continue eliquis.  Nutritionist added supplements with Nepro and 

Arturo twice daily.  Acute kidney injury hyponatremia has resolved.  Chest x-ray 

shows no acute findings.  Anticipate discharge in a.m.





1/6/2022.  Patient's oxygen requirement has been increased to 15 L on Venturi 

mask with FiO2 of 50%.  We will check CTA of the chest to rule out PE especially

given left DVT and respiratory compromise.  Patient was on 2 L satting at 97% 

yesterday.  Pulmonary consultation





1/7/2022.  CT of the chest revealed right upper lobe and middle lobe pneumonia 

that may represent aspiration.  We will consult speech therapy for further 

evaluation.  Continue O2 to maintain sats greater than 92%.  We will start Zosyn

IV and consult ID for further evaluation





1/8/2022.  1/8/2022.  Continue IV antibiotics of Zosyn and follow-up cultures.  

Continue O2 to maintain sats greater than 92%.  Speech therapy evaluation 

pending.  Patient with sepsis not present on admission as evident by 

leukocytosis, tachycardia, tachypnea and diagnosis of probable aspiration 

pneumonia.





1/9/2022.  Patient currently requiring 3 L O2 via nasal cannula.  Patient much 

more alert and oriented this morning.  Leukocytosis resolved.  Patient appears 

to be tolerating diet this morning but I informed the nurse to discontinue diet 

for now and have formal speech evaluation.  Keep patient n.p.o. until cleared by

speech.  Patient may be silently aspirating.





1/10/2022.  Patient currently requiring 3 L O2 via nasal cannula.  Continue IV 

antibiotics for probable aspiration pneumonia.  Await speech therapy evaluation.

 PT evaluation.





1/11/2022: Patient is confused but follows commands. Has history of CVA and 

likely has a underlying dementia. Will discontinue narcotics and monitor for 

mental status improvement. O2 needs improved from a 15 to 3 L. On Zosyn for 

aspiration ammonia and Eliquis for lower extremity DVT. Cardiology following for

NSVT. Nephrology seeing as needed since renal function improved. Likely needs 

subacute rehab placement. Discussed with CM.








History


Interval history: 





Patient is confused but follows commands. O2 needs improved from a 15 to 3 L. On

antibiotics for aspiration ammonia. Cardiology following for NSVT.





Hospitalist Physical





- Constitutional


Vitals: 


                                        











Temp Pulse Resp BP Pulse Ox


 


 98.3 F   74   16   122/66   98 


 


 01/11/22 15:18  01/11/22 15:38  01/11/22 15:38  01/11/22 15:18  01/11/22 15:18











General appearance: Present: no acute distress, disheveled, other (Debilitated)





- EENT


Eyes: Present: PERRL, EOM intact





- Neck


Neck: Present: supple





- Respiratory


Respiratory effort: normal


Respiratory: bilateral: diminished





- Cardiovascular


Rhythm: regular





- Extremities


Extremity abnormal: edema (Bilateral pedal edema worse on the right with stasis 

changes present)





- Abdominal


General gastrointestinal: soft, non-tender





- Neurologic


Neurologic: other (Awake, minimally verbal, confused, follows commands. Moves 

all extremities.)





Results





- Labs


CBC & Chem 7: 


                                 01/11/22 15:24





                                 01/11/22 15:24


Labs: 


                             Laboratory Last Values











WBC  7.3 K/mm3 (4.5-11.0)   01/11/22  15:24    


 


RBC  2.97 M/mm3 (3.65-5.03)  L  01/11/22  15:24    


 


Hgb  8.3 gm/dl (11.8-15.2)  L  01/11/22  15:24    


 


Hct  26.0 % (35.5-45.6)  L  01/11/22  15:24    


 


MCV  88 fl (84-94)   01/11/22  15:24    


 


MCH  28 pg (28-32)   01/11/22  15:24    


 


MCHC  32 % (32-34)   01/11/22  15:24    


 


RDW  19.9 % (13.2-15.2)  H  01/11/22  15:24    


 


Plt Count  222 K/mm3 (140-440)   01/11/22  15:24    


 


Lymph % (Auto)  20.0 % (13.4-35.0)   01/10/22  15:06    


 


Mono % (Auto)  12.3 % (0.0-7.3)  H  01/10/22  15:06    


 


Eos % (Auto)  0.3 % (0.0-4.3)   01/10/22  15:06    


 


Baso % (Auto)  0.4 % (0.0-1.8)   01/10/22  15:06    


 


Lymph # (Auto)  1.7 K/mm3 (1.2-5.4)   01/10/22  15:06    


 


Mono # (Auto)  1.0 K/mm3 (0.0-0.8)  H  01/10/22  15:06    


 


Eos # (Auto)  0.0 K/mm3 (0.0-0.4)   01/10/22  15:06    


 


Baso # (Auto)  0.0 K/mm3 (0.0-0.1)   01/10/22  15:06    


 


Add Manual Diff  Complete   01/01/22  11:15    


 


Total Counted  100   01/01/22  11:15    


 


Seg Neutrophils %  67.0 % (40.0-70.0)   01/10/22  15:06    


 


Seg Neuts % (Manual)  72.0 % (40.0-70.0)  H  01/01/22  11:15    


 


Lymphocytes % (Manual)  24.0 % (13.4-35.0)   01/01/22  11:15    


 


Monocytes % (Manual)  4.0 % (0.0-7.3)   01/01/22  11:15    


 


Nucleated RBC %  Not Reportable   01/01/22  11:15    


 


Seg Neutrophils #  5.6 K/mm3 (1.8-7.7)   01/10/22  15:06    


 


Seg Neutrophils # Man  4.2 K/mm3 (1.8-7.7)   01/01/22  11:15    


 


Band Neutrophils #  0.0 K/mm3  01/01/22  11:15    


 


Lymphocytes # (Manual)  1.4 K/mm3 (1.2-5.4)   01/01/22  11:15    


 


Abs React Lymphs (Man)  0.0 K/mm3  01/01/22  11:15    


 


Monocytes # (Manual)  0.2 K/mm3 (0.0-0.8)   01/01/22  11:15    


 


Eosinophils # (Manual)  0.0 K/mm3 (0.0-0.4)   01/01/22  11:15    


 


Basophils # (Manual)  0.0 K/mm3 (0.0-0.1)   01/01/22  11:15    


 


Metamyelocytes #  0.0 K/mm3  01/01/22  11:15    


 


Myelocytes #  0.0 K/mm3  01/01/22  11:15    


 


Promyelocytes #  0.0 K/mm3  01/01/22  11:15    


 


Blast Cells #  0.0 K/mm3  01/01/22  11:15    


 


WBC Morphology  Not Reportable   01/01/22  11:15    


 


Hypersegmented Neuts  Not Reportable   01/01/22  11:15    


 


Hyposegmented Neuts  Not Reportable   01/01/22  11:15    


 


Hypogranular Neuts  Not Reportable   01/01/22  11:15    


 


Smudge Cells  Not Reportable   01/01/22  11:15    


 


Toxic Granulation  Not Reportable   01/01/22  11:15    


 


Toxic Vacuolation  Not Reportable   01/01/22  11:15    


 


Dohle Bodies  Not Reportable   01/01/22  11:15    


 


Pelger-Huet Anomaly  Not Reportable   01/01/22  11:15    


 


Shamir Rods  Not Reportable   01/01/22  11:15    


 


Platelet Estimate  Consistent w auto   01/01/22  11:15    


 


Clumped Platelets  Not Reportable   01/01/22  11:15    


 


Plt Clumps, EDTA  Not Reportable   01/01/22  11:15    


 


Large Platelets  Not Reportable   01/01/22  11:15    


 


Giant Platelets  Not Reportable   01/01/22  11:15    


 


Platelet Satelliting  Not Reportable   01/01/22  11:15    


 


Plt Morphology Comment  Not Reportable   01/01/22  11:15    


 


RBC Morphology  Not Reportable   01/01/22  11:15    


 


Dimorphic RBCs  Not Reportable   01/01/22  11:15    


 


Polychromasia  Not Reportable   01/01/22  11:15    


 


Hypochromasia  Not Reportable   01/01/22  11:15    


 


Poikilocytosis  Not Reportable   01/01/22  11:15    


 


Anisocytosis  Not Reportable   01/01/22  11:15    


 


Microcytosis  Not Reportable   01/01/22  11:15    


 


Macrocytosis  Not Reportable   01/01/22  11:15    


 


Spherocytes  Not Reportable   01/01/22  11:15    


 


Pappenheimer Bodies  Not Reportable   01/01/22  11:15    


 


Sickle Cells  Not Reportable   01/01/22  11:15    


 


Target Cells  Not Reportable   01/01/22  11:15    


 


Tear Drop Cells  Not Reportable   01/01/22  11:15    


 


Ovalocytes  Not Reportable   01/01/22  11:15    


 


Helmet Cells  Not Reportable   01/01/22  11:15    


 


[place] Bodies  Not Reportable   01/01/22  11:15    


 


Cabot Rings  Not Reportable   01/01/22  11:15    


 


[place] Cells  Not Reportable   01/01/22  11:15    


 


Bite Cells  Not Reportable   01/01/22  11:15    


 


Crenated Cell  Not Reportable   01/01/22  11:15    


 


Elliptocytes  Not Reportable   01/01/22  11:15    


 


Acanthocytes (Spur)  Not Reportable   01/01/22  11:15    


 


Rouleaux  Not Reportable   01/01/22  11:15    


 


Hemoglobin C Crystals  Not Reportable   01/01/22  11:15    


 


Schistocytes  Not Reportable   01/01/22  11:15    


 


Malaria parasites  Not Reportable   01/01/22  11:15    


 


Shaggy Bodies  Not Reportable   01/01/22  11:15    


 


Hem Pathologist Commnt  No   01/01/22  11:15    


 


PT  14.5 Sec. (12.2-14.9)   01/05/22  14:48    


 


INR  1.02  (0.87-1.13)   01/05/22  14:48    


 


APTT  33.1 Sec. (24.2-36.6)   01/05/22  14:48    


 


Heparin Anti-Xa Level  < 0.10 U.I./ml (0.3-0.7)  L  01/05/22  14:48    


 


ABG pH  7.431 pH Units (7.350-7.450)   01/06/22  12:30    


 


ABG pCO2  49.5 mm Hg  01/06/22  12:30    


 


ABG pO2  25.2 mm Hg (80.0-90.0)  L*  01/06/22  12:30    


 


ABG HCO3  32.2 mmol/L (20.0-26.0)  H  01/06/22  12:30    


 


ABG O2 Saturation  41.1 % (95.0-99.0)  L  01/06/22  12:30    


 


ABG O2 Content  9.4  (0.0-44)   01/06/22  12:30    


 


ABG Base Excess  6.6 mmol/L (-2.0-3.0)  H  01/06/22  12:30    


 


ABG Hemoglobin  10.2 gm/dl (14.0-18.0)  L  01/06/22  12:30    


 


ABG Carboxyhemoglobin  1.7 % (0.0-5.0)   01/06/22  12:30    


 


ABG Methemoglobin  0.8 % (0.0-1.5)   01/06/22  12:30    


 


Oxyhemoglobin  40.1 % (95.0-99.0)  L  01/06/22  12:30    


 


FiO2  28 %  01/06/22  12:30    


 


Sodium  147 mmol/L (137-145)  H  01/10/22  15:06    


 


Potassium  3.9 mmol/L (3.6-5.0)  D 01/10/22  15:06    


 


Chloride  107.0 mmol/L ()   01/10/22  15:06    


 


Carbon Dioxide  27 mmol/L (22-30)   01/10/22  15:06    


 


Anion Gap  17 mmol/L  01/10/22  15:06    


 


BUN  10 mg/dL (9-20)   01/10/22  15:06    


 


Creatinine  0.8 mg/dL (0.8-1.3)   01/11/22  15:24    


 


Estimated GFR  > 60 ml/min  01/11/22  15:24    


 


BUN/Creatinine Ratio  14 %  01/10/22  15:06    


 


Glucose  107 mg/dL ()  H  01/10/22  15:06    


 


POC Glucose  80 mg/dL ()   01/09/22  19:10    


 


Calcium  8.5 mg/dL (8.4-10.2)   01/10/22  15:06    


 


Magnesium  1.70 mg/dL (1.7-2.3)   01/05/22  14:48    


 


Total Bilirubin  1.10 mg/dL (0.1-1.2)   01/09/22  13:11    


 


AST  23 units/L (5-40)   01/09/22  13:11    


 


ALT  15 units/L (7-56)   01/09/22  13:11    


 


Alkaline Phosphatase  129 units/L ()   01/09/22  13:11    


 


NT-Pro-B Natriuret Pep  1300 pg/mL (0-900)  H  12/30/21  23:51    


 


Total Protein  5.7 g/dL (6.3-8.2)  L  01/09/22  13:11    


 


Albumin  2.5 g/dL (3.9-5)  L  01/09/22  13:11    


 


Albumin/Globulin Ratio  0.8 %  01/09/22  13:11    


 


TSH  2.930 mlU/mL (0.270-4.200)   01/03/22  00:06    


 


Total Cortisol  40.5 mcg/dL (***)   01/03/22  00:06    


 


Urine Color  Maren  (Yellow)   12/31/21  01:00    


 


Urine Turbidity  Slightly-cloudy  (Clear)   12/31/21  01:00    


 


Urine pH  6.0  (5.0-7.0)   12/31/21  01:00    


 


Ur Specific Gravity  1.012  (1.003-1.030)   12/31/21  01:00    


 


Urine Protein  30 mg/dl mg/dL (Negative)   12/31/21  01:00    


 


Urine Glucose (UA)  Neg mg/dL (Negative)   12/31/21  01:00    


 


Urine Ketones  Neg mg/dL (Negative)   12/31/21  01:00    


 


Urine Blood  Neg  (Negative)   12/31/21  01:00    


 


Urine Nitrite  Neg  (Negative)   12/31/21  01:00    


 


Urine Bilirubin  Neg  (Negative)   12/31/21  01:00    


 


Urine Urobilinogen  < 2.0 mg/dL (<2.0)   12/31/21  01:00    


 


Ur Leukocyte Esterase  Neg  (Negative)   12/31/21  01:00    


 


Urine WBC (Auto)  9.0 /HPF (0.0-6.0)  H  12/31/21  01:00    


 


Urine RBC (Auto)  3.0 /HPF (0.0-6.0)   12/31/21  01:00    


 


U Epithel Cells (Auto)  < 1.0 /HPF (0-13.0)   12/31/21  01:00    


 


Hyaline Casts  8 /LPF  12/31/21  01:00    


 


Urine Mucus  Few /HPF  12/31/21  01:00    











[place]/IV: 


                                        





Voiding Method                   Condom Catheter











Active Medications





- Current Medications


Current Medications: 














Generic Name Dose Route Start Last Admin





  Trade Name Freq  PRN Reason Stop Dose Admin


 


Acetaminophen  650 mg  12/31/21 03:51  01/02/22 22:07





  Acetaminophen 325 Mg Tab  PO   650 mg





  Q4H PRN   Administration





  Pain MILD(1-3)/Fever >100.5/HA  


 


Albuterol  2.5 mg  01/04/22 12:00 





  Albuterol 2.5 Mg/3 Ml Nebu  IH  





  Q4HRT PRN  





  Shortness Of Breath  


 


Albuterol/Ipratropium  1 ampul  01/04/22 14:00  01/11/22 15:38





  Ipratropium/Albuterol Sulfate 3 Ml Ampul.Neb  IH   1 ampul





  TIDRT SHAY   Administration


 


Apixaban  10 mg  01/05/22 10:00  01/11/22 11:15





  Apixaban 5 Mg Tab  PO  01/11/22 22:01  10 mg





  Q12HR SHAY   Administration





  Protocol  


 


Apixaban  5 mg  01/12/22 10:00 





  Apixaban 5 Mg Tab  PO  





  Q12HR SHAY  





  Protocol  


 


Famotidine  10 mg  12/31/21 10:00  01/11/22 11:15





  Famotidine 10 Mg Tab  PO   10 mg





  BID SHAY   Administration


 


Hydromorphone HCl  0.5 mg  12/31/21 03:51 





  Hydromorphone 1 Mg/1 Ml Inj  IV  





  Q3H PRN  





  Pain , Severe (7-10)  


 


Piperacillin Sod/Tazobactam Sod  4.5 gm in 100 mls @ 200 mls/hr  01/08/22 10:00 

01/11/22 11:18





  Zosyn/Ns 4.5gm/100ml  IV  01/13/22 02:29  200 mls/hr





  Q8H SHAY   Administration


 


Dextrose/Sodium Chloride  1,000 mls @ 75 mls/hr  01/09/22 20:00  01/10/22 18:19





  D5ns  IV   75 mls/hr





  AS DIRECT SHAY   Administration


 


Metoprolol Tartrate  12.5 mg  01/03/22 22:00  01/11/22 11:15





  Metoprolol Tartrate 25 Mg Tab  PO   Not Given





  BID SHAY  


 


Morphine Sulfate  2 mg  12/31/21 03:51  01/07/22 14:59





  Morphine 2 Mg/1 Ml Inj  IV   2 mg





  Q4H PRN   Administration





  Pain, Moderate (4-6)  


 


Ondansetron HCl  4 mg  12/31/21 03:51 





  Ondansetron 4 Mg/2 Ml Inj  IV  





  Q8H PRN  





  Nausea And Vomiting  


 


Sodium Chloride  10 ml  12/31/21 10:00  01/11/22 11:16





  Sodium Chloride 0.9% 10 Ml Flush Syringe  IV   10 ml





  BID SHAY   Administration


 


Sodium Chloride  10 ml  12/31/21 03:51 





  Sodium Chloride 0.9% 10 Ml Flush Syringe  IV  





  PRN PRN  





  LINE FLUSH  


 


Spironolactone  25 mg  12/31/21 16:00  01/11/22 11:15





  Spironolactone 25 Mg Tab  PO   25 mg





  QDAY SHAY   Administration














Nutrition/Malnutrition Assess





- Dietary Evaluation


Nutrition/Malnutrition Findings: 


                                        





Nutrition Notes                                            Start:  12/31/21 

19:00


Freq:                                                      Status: Active       




Protocol:                                                                       




 Document     01/10/22 17:58  ZACHARY  (Rec: 01/10/22 18:18  ZACHARY  [id number])


 Nutrition Notes


     Initial or Follow up                        Brief Note


     Current Diet                                Pureed Diet (since B 01/11).


     Height                                      5 ft 6 in


     Weight                                      71.7 kg


     Ideal Body Weight (kg)                      64.54


     BMI                                         25.4


     Weight change and time frame                Discrepancy of 6.3 Kg body


                                                 weight loss in 3 days reported


                                                 .


     Weight Status                               Appropriate


     Subjective/Other Information                RD consult for routine F/U on


                                                 dietary assessment.


                                                 No report available on PO


                                                 intake of meals at the time.


                                                 MD ordered NPO until SLP


                                                 evaluation; now Pt is on PO,


                                                 nut no report of SLP


                                                 evaluation available at the


                                                 time.


                                                 No report on changes of


                                                 decubitus pressure ulcer.


     Percent of energy/protein needs met:        Prescribed Pureed Diet


                                                 provides for energy/protein


                                                 needs (1,804 Kcal/77 g) during


                                                 LOS.


     #1


      Nutrition Diagnosis                        Increased nutrient needs   (


                                                 specify in comment below)


      Diagnosis Progress(for reassessment        Continues


       documentation)                            


     Is patient on ventilator?                   No


     Is Patient Ambulatory and/or Out of Bed     No


     REE-([place]-confined to bed)      [phone]


     Kcal/Kg value to use for calculation        20


     Approximate Energy Requirements Using       1434


      kcal/Kg                                    


     Calculation Used for Recommendations        Kcal/kg


     Additional Notes                            Protein: 1-1.2 g/Kg; 72-86 g/


                                                 day.


                                                 Fluids: 1 ml/Kcal, or as per


                                                 MD.


 Nutrition Intervention


     Change Diet Order:                          Continue Pureed Diet.


     Add Supplement/Snack (indicate name/kcal    Continue 8 fl oz Nepro w/


      /protein )                                 Carbsteady; TID.


                                                 28.8 g pkt Arturo; BID.


     Provides kCal:                              1,465


     Provides Protein (gm)                       62


     Goal #1                                     Support, through dietary


                                                 supplementation, wound healing


                                                 processes during LOS.


     Follow-Up By:                               01/17/22


     Additional Comments                         Continue monitoring food


                                                 tolerance, %PO intake of meals


                                                 , and BM.
Assessment and Plan


Assessment and plan: 








76-year-old  male with history of hypertension CVA dementia  right hip fracture 

and a chronically nonambulatory and wheelchair-bound cared by his female partner

who is presenting with leg swelling.





Sepsis.  Not present on admission 





anasarca





Left DVT





NSVT





Acute hypoxic respiratory failure





Hypokalemia





CVA, nonambulatory chronically, wheelchair-bound





Pleasantly demented/disoriented





Hypertension





Hyponatremia





Rule out nephrotic syndrome











Daily events


12/31: Follow up echo. Will hold off on lasix at this time due to low K. Replete

K with KCL IV (ordered 8 bags). May need albumin/lasix per nephrology. Follow up

diagnostic studies and cardiology recommendations.





01/01/22: Mild diastolic dysfunction, LVEF 55-65% per Echo read. Continue d

iuresis with spironalactone. Strict I/O's. May consider albumin/lasix if 

diuresis not adequate. Will continue to follow nephrology and cardiology 

recommendations. Anticipate dc in next 24-48 hrs. 





01/02/22: administered albumin + lasix 40 IV x 1.





01/03/22: albumin lasix ordered. US of lower extremity demonstrates DVT in 

popliteal vein. Initiated on heparin gtt.





1/4/2022: Transition from heparin drip to oral anticoagulation if okay with 

nephrology.  Hypokalemia improved.  Nephrology following.  At this time 

urinalysis does not show any evidence of blood only 30 mg of protein.  Albumin 

however is 2.5 which is very concerning rule out other causes for malnutrition/?

 Liver disease, bilirubin noted to be elevated





1/5/2022: We will transition from heparin drip to Eliquis.  Nephrology to check 

magnesium and BMP.  Nurse reports patient has very poor p.o. intake.  Will 

consult dietitian for further evaluation.





1/5/2022:  Continue eliquis.  Nutritionist added supplements with Nepro and Juv

en twice daily.  Acute kidney injury hyponatremia has resolved.  Chest x-ray 

shows no acute findings.  Anticipate discharge in a.m.





1/6/2022.  Patient's oxygen requirement has been increased to 15 L on Venturi 

mask with FiO2 of 50%.  We will check CTA of the chest to rule out PE especially

given left DVT and respiratory compromise.  Patient was on 2 L satting at 97% 

yesterday.  Pulmonary consultation





1/7/2022.  CT of the chest revealed right upper lobe and middle lobe pneumonia 

that may represent aspiration.  We will consult speech therapy for further 

evaluation.  Continue O2 to maintain sats greater than 92%.  We will start Zosyn

IV and consult ID for further evaluation





1/8/2022.  1/8/2022.  Continue IV antibiotics of Zosyn and follow-up cultures.  

Continue O2 to maintain sats greater than 92%.  Speech therapy evaluation 

pending.  Patient with sepsis not present on admission as evident by 

leukocytosis, tachycardia, tachypnea and diagnosis of probable aspiration 

pneumonia.





1/9/2022.  Patient currently requiring 3 L O2 via nasal cannula.  Patient much 

more alert and oriented this morning.  Leukocytosis resolved.  Patient appears 

to be tolerating diet this morning but I informed the nurse to discontinue diet 

for now and have formal speech evaluation.  Keep patient n.p.o. until cleared by

speech.  Patient may be silently aspirating.





1/10/2022.  Patient currently requiring 3 L O2 via nasal cannula.  Continue IV 

antibiotics for probable aspiration pneumonia.  Await speech therapy evaluation.

 PT evaluation.





1/11/2022: Patient is confused but follows commands. Has history of CVA and 

likely has a underlying dementia. Will discontinue narcotics and monitor for 

mental status improvement. O2 needs improved from a 15 to 3 L. On Zosyn for 

aspiration ammonia and Eliquis for lower extremity DVT. Cardiology following for

NSVT. Nephrology seeing as needed since renal function improved. 





1/12: Patient is clinically improving, hypoxia improving, O2 being weaned, will 

be discharged back to home when stabilized.








History


Interval history: 





Patient is confused but Conversational follows commands. O2 needs improved from 

a 15 to 3 L. On antibiotics for aspiration pneumonia cardiology following for 

NSVT.  Patient is not compliant.  No other issues reported.  He is tolerating 

diet.











Hospitalist Physical





- Constitutional


Vitals: 


                                        











Temp Pulse Resp BP Pulse Ox


 


 97.3 F L  87   20   130/46   97 


 


 01/12/22 12:30  01/12/22 14:26  01/12/22 12:30  01/12/22 12:30  01/12/22 16:11











General appearance: Present: no acute distress, disheveled, other (Debilitated 

awake but disoriented)





- EENT


Eyes: Present: PERRL, EOM intact


ENT: hearing intact





- Neck


Neck: Present: supple





- Respiratory


Respiratory effort: normal


Respiratory: bilateral: diminished





- Cardiovascular


Rhythm: regular





- Extremities


Extremities: No edema





- Abdominal


General gastrointestinal: soft, non-tender, non-distended





- Integumentary


Integumentary: Present: rash (Stasis changes in lower extremities)





- Psychiatric


Psychiatric: cooperative





- Neurologic


Neurologic: other (Patient is pleasantly demented.  Fluent speech but 

disoriented.  Good strength in upper extremities.  Nonambulatory and wheelchair-

bound.)





Results





- Labs


CBC & Chem 7: 


                                 01/12/22 07:53





                                 01/14/22 09:02


Labs: 


                             Laboratory Last Values











WBC  7.3 K/mm3 (4.5-11.0)   01/11/22  15:24    


 


RBC  2.97 M/mm3 (3.65-5.03)  L  01/11/22  15:24    


 


Hgb  9.1 gm/dl (11.8-15.2)  L  01/12/22  07:53    


 


Hct  29.1 % (35.5-45.6)  L  01/12/22  07:53    


 


MCV  88 fl (84-94)   01/11/22  15:24    


 


MCH  28 pg (28-32)   01/11/22  15:24    


 


MCHC  32 % (32-34)   01/11/22  15:24    


 


RDW  19.9 % (13.2-15.2)  H  01/11/22  15:24    


 


Plt Count  210 K/mm3 (140-440)   01/12/22  07:53    


 


Lymph % (Auto)  20.0 % (13.4-35.0)   01/10/22  15:06    


 


Mono % (Auto)  12.3 % (0.0-7.3)  H  01/10/22  15:06    


 


Eos % (Auto)  0.3 % (0.0-4.3)   01/10/22  15:06    


 


Baso % (Auto)  0.4 % (0.0-1.8)   01/10/22  15:06    


 


Lymph # (Auto)  1.7 K/mm3 (1.2-5.4)   01/10/22  15:06    


 


Mono # (Auto)  1.0 K/mm3 (0.0-0.8)  H  01/10/22  15:06    


 


Eos # (Auto)  0.0 K/mm3 (0.0-0.4)   01/10/22  15:06    


 


Baso # (Auto)  0.0 K/mm3 (0.0-0.1)   01/10/22  15:06    


 


Add Manual Diff  Complete   01/01/22  11:15    


 


Total Counted  100   01/01/22  11:15    


 


Seg Neutrophils %  67.0 % (40.0-70.0)   01/10/22  15:06    


 


Seg Neuts % (Manual)  72.0 % (40.0-70.0)  H  01/01/22  11:15    


 


Lymphocytes % (Manual)  24.0 % (13.4-35.0)   01/01/22  11:15    


 


Monocytes % (Manual)  4.0 % (0.0-7.3)   01/01/22  11:15    


 


Nucleated RBC %  Not Reportable   01/01/22  11:15    


 


Seg Neutrophils #  5.6 K/mm3 (1.8-7.7)   01/10/22  15:06    


 


Seg Neutrophils # Man  4.2 K/mm3 (1.8-7.7)   01/01/22  11:15    


 


Band Neutrophils #  0.0 K/mm3  01/01/22  11:15    


 


Lymphocytes # (Manual)  1.4 K/mm3 (1.2-5.4)   01/01/22  11:15    


 


Abs React Lymphs (Man)  0.0 K/mm3  01/01/22  11:15    


 


Monocytes # (Manual)  0.2 K/mm3 (0.0-0.8)   01/01/22  11:15    


 


Eosinophils # (Manual)  0.0 K/mm3 (0.0-0.4)   01/01/22  11:15    


 


Basophils # (Manual)  0.0 K/mm3 (0.0-0.1)   01/01/22  11:15    


 


Metamyelocytes #  0.0 K/mm3  01/01/22  11:15    


 


Myelocytes #  0.0 K/mm3  01/01/22  11:15    


 


Promyelocytes #  0.0 K/mm3  01/01/22  11:15    


 


Blast Cells #  0.0 K/mm3  01/01/22  11:15    


 


WBC Morphology  Not Reportable   01/01/22  11:15    


 


Hypersegmented Neuts  Not Reportable   01/01/22  11:15    


 


Hyposegmented Neuts  Not Reportable   01/01/22  11:15    


 


Hypogranular Neuts  Not Reportable   01/01/22  11:15    


 


Smudge Cells  Not Reportable   01/01/22  11:15    


 


Toxic Granulation  Not Reportable   01/01/22  11:15    


 


Toxic Vacuolation  Not Reportable   01/01/22  11:15    


 


Dohle Bodies  Not Reportable   01/01/22  11:15    


 


Pelger-Huet Anomaly  Not Reportable   01/01/22  11:15    


 


Shamir Rods  Not Reportable   01/01/22  11:15    


 


Platelet Estimate  Consistent w auto   01/01/22  11:15    


 


Clumped Platelets  Not Reportable   01/01/22  11:15    


 


Plt Clumps, EDTA  Not Reportable   01/01/22  11:15    


 


Large Platelets  Not Reportable   01/01/22  11:15    


 


Giant Platelets  Not Reportable   01/01/22  11:15    


 


Platelet Satelliting  Not Reportable   01/01/22  11:15    


 


Plt Morphology Comment  Not Reportable   01/01/22  11:15    


 


RBC Morphology  Not Reportable   01/01/22  11:15    


 


Dimorphic RBCs  Not Reportable   01/01/22  11:15    


 


Polychromasia  Not Reportable   01/01/22  11:15    


 


Hypochromasia  Not Reportable   01/01/22  11:15    


 


Poikilocytosis  Not Reportable   01/01/22  11:15    


 


Anisocytosis  Not Reportable   01/01/22  11:15    


 


Microcytosis  Not Reportable   01/01/22  11:15    


 


Macrocytosis  Not Reportable   01/01/22  11:15    


 


Spherocytes  Not Reportable   01/01/22  11:15    


 


Pappenheimer Bodies  Not Reportable   01/01/22  11:15    


 


Sickle Cells  Not Reportable   01/01/22  11:15    


 


Target Cells  Not Reportable   01/01/22  11:15    


 


Tear Drop Cells  Not Reportable   01/01/22  11:15    


 


Ovalocytes  Not Reportable   01/01/22  11:15    


 


Helmet Cells  Not Reportable   01/01/22  11:15    


 


Cuevas-St. Michael Bodies  Not Reportable   01/01/22  11:15    


 


Cabot Rings  Not Reportable   01/01/22  11:15    


 


[place] Cells  Not Reportable   01/01/22  11:15    


 


Bite Cells  Not Reportable   01/01/22  11:15    


 


Crenated Cell  Not Reportable   01/01/22  11:15    


 


Elliptocytes  Not Reportable   01/01/22  11:15    


 


Acanthocytes (Spur)  Not Reportable   01/01/22  11:15    


 


Rouleaux  Not Reportable   01/01/22  11:15    


 


Hemoglobin C Crystals  Not Reportable   01/01/22  11:15    


 


Schistocytes  Not Reportable   01/01/22  11:15    


 


Malaria parasites  Not Reportable   01/01/22  11:15    


 


Shaggy Bodies  Not Reportable   01/01/22  11:15    


 


Hem Pathologist Commnt  No   01/01/22  11:15    


 


PT  14.5 Sec. (12.2-14.9)   01/05/22  14:48    


 


INR  1.02  (0.87-1.13)   01/05/22  14:48    


 


APTT  33.1 Sec. (24.2-36.6)   01/05/22  14:48    


 


Heparin Anti-Xa Level  < 0.10 U.I./ml (0.3-0.7)  L  01/05/22  14:48    


 


ABG pH  7.431 pH Units (7.350-7.450)   01/06/22  12:30    


 


ABG pCO2  49.5 mm Hg  01/06/22  12:30    


 


ABG pO2  25.2 mm Hg (80.0-90.0)  L*  01/06/22  12:30    


 


ABG HCO3  32.2 mmol/L (20.0-26.0)  H  01/06/22  12:30    


 


ABG O2 Saturation  41.1 % (95.0-99.0)  L  01/06/22  12:30    


 


ABG O2 Content  9.4  (0.0-44)   01/06/22  12:30    


 


ABG Base Excess  6.6 mmol/L (-2.0-3.0)  H  01/06/22  12:30    


 


ABG Hemoglobin  10.2 gm/dl (14.0-18.0)  L  01/06/22  12:30    


 


ABG Carboxyhemoglobin  1.7 % (0.0-5.0)   01/06/22  12:30    


 


ABG Methemoglobin  0.8 % (0.0-1.5)   01/06/22  12:30    


 


Oxyhemoglobin  40.1 % (95.0-99.0)  L  01/06/22  12:30    


 


FiO2  28 %  01/06/22  12:30    


 


Sodium  147 mmol/L (137-145)  H  01/10/22  15:06    


 


Potassium  3.9 mmol/L (3.6-5.0)  D 01/10/22  15:06    


 


Chloride  107.0 mmol/L ()   01/10/22  15:06    


 


Carbon Dioxide  27 mmol/L (22-30)   01/10/22  15:06    


 


Anion Gap  17 mmol/L  01/10/22  15:06    


 


BUN  10 mg/dL (9-20)   01/10/22  15:06    


 


Creatinine  0.8 mg/dL (0.8-1.3)   01/11/22  15:24    


 


Estimated GFR  > 60 ml/min  01/11/22  15:24    


 


BUN/Creatinine Ratio  14 %  01/10/22  15:06    


 


Glucose  107 mg/dL ()  H  01/10/22  15:06    


 


POC Glucose  80 mg/dL ()   01/09/22  19:10    


 


Calcium  8.5 mg/dL (8.4-10.2)   01/10/22  15:06    


 


Magnesium  1.70 mg/dL (1.7-2.3)   01/05/22  14:48    


 


Total Bilirubin  1.10 mg/dL (0.1-1.2)   01/09/22  13:11    


 


AST  23 units/L (5-40)   01/09/22  13:11    


 


ALT  15 units/L (7-56)   01/09/22  13:11    


 


Alkaline Phosphatase  129 units/L ()   01/09/22  13:11    


 


NT-Pro-B Natriuret Pep  1300 pg/mL (0-900)  H  12/30/21  23:51    


 


Total Protein  5.7 g/dL (6.3-8.2)  L  01/09/22  13:11    


 


Albumin  2.5 g/dL (3.9-5)  L  01/09/22  13:11    


 


Albumin/Globulin Ratio  0.8 %  01/09/22  13:11    


 


TSH  2.930 mlU/mL (0.270-4.200)   01/03/22  00:06    


 


Total Cortisol  40.5 mcg/dL (***)   01/03/22  00:06    


 


Urine Color  Maren  (Yellow)   12/31/21  01:00    


 


Urine Turbidity  Slightly-cloudy  (Clear)   12/31/21  01:00    


 


Urine pH  6.0  (5.0-7.0)   12/31/21  01:00    


 


Ur Specific Gravity  1.012  (1.003-1.030)   12/31/21  01:00    


 


Urine Protein  30 mg/dl mg/dL (Negative)   12/31/21  01:00    


 


Urine Glucose (UA)  Neg mg/dL (Negative)   12/31/21  01:00    


 


Urine Ketones  Neg mg/dL (Negative)   12/31/21  01:00    


 


Urine Blood  Neg  (Negative)   12/31/21  01:00    


 


Urine Nitrite  Neg  (Negative)   12/31/21  01:00    


 


Urine Bilirubin  Neg  (Negative)   12/31/21  01:00    


 


Urine Urobilinogen  < 2.0 mg/dL (<2.0)   12/31/21  01:00    


 


Ur Leukocyte Esterase  Neg  (Negative)   12/31/21  01:00    


 


Urine WBC (Auto)  9.0 /HPF (0.0-6.0)  H  12/31/21  01:00    


 


Urine RBC (Auto)  3.0 /HPF (0.0-6.0)   12/31/21  01:00    


 


U Epithel Cells (Auto)  < 1.0 /HPF (0-13.0)   12/31/21  01:00    


 


Hyaline Casts  8 /LPF  12/31/21  01:00    


 


Urine Mucus  Few /HPF  12/31/21  01:00    











Acosta/IV: 


                                        





Voiding Method                   Condom Catheter











Active Medications





- Current Medications


Current Medications: 














Generic Name Dose Route Start Last Admin





  Trade Name Freq  PRN Reason Stop Dose Admin


 


Acetaminophen  650 mg  12/31/21 03:51  01/02/22 22:07





  Acetaminophen 325 Mg Tab  PO   650 mg





  Q4H PRN   Administration





  Pain MILD(1-3)/Fever >100.5/HA  


 


Albuterol  2.5 mg  01/04/22 12:00 





  Albuterol 2.5 Mg/3 Ml Nebu  IH  





  Q4HRT PRN  





  Shortness Of Breath  


 


Albuterol/Ipratropium  1 ampul  01/04/22 14:00  01/12/22 08:00





  Ipratropium/Albuterol Sulfate 3 Ml Ampul.Neb  IH   1 ampul





  TIDRT SHAY   Administration


 


Apixaban  5 mg  01/12/22 10:00  01/12/22 14:26





  Apixaban 5 Mg Tab  PO   5 mg





  Q12HR SHAY   Administration





  Protocol  


 


Famotidine  10 mg  12/31/21 10:00  01/12/22 14:21





  Famotidine 10 Mg Tab  PO   10 mg





  BID SHAY   Administration


 


Piperacillin Sod/Tazobactam Sod  4.5 gm in 100 mls @ 200 mls/hr  01/08/22 10:00 

01/12/22 14:14





  Zosyn/Ns 4.5gm/100ml  IV  01/13/22 02:29  200 mls/hr





  Q8H SHAY   Administration


 


Dextrose/Sodium Chloride  1,000 mls @ 75 mls/hr  01/09/22 20:00  01/12/22 14:14





  D5ns  IV   75 mls/hr





  AS DIRECT SHAY   Administration


 


Metoprolol Tartrate  12.5 mg  01/03/22 22:00  01/12/22 14:21





  Metoprolol Tartrate 25 Mg Tab  PO   12.5 mg





  BID SHAY   Administration


 


Ondansetron HCl  4 mg  12/31/21 03:51 





  Ondansetron 4 Mg/2 Ml Inj  IV  





  Q8H PRN  





  Nausea And Vomiting  


 


Sodium Chloride  10 ml  12/31/21 10:00  01/12/22 14:27





  Sodium Chloride 0.9% 10 Ml Flush Syringe  IV   10 ml





  BID SHAY   Administration


 


Sodium Chloride  10 ml  12/31/21 03:51 





  Sodium Chloride 0.9% 10 Ml Flush Syringe  IV  





  PRN PRN  





  LINE FLUSH  


 


Spironolactone  25 mg  12/31/21 16:00  01/12/22 14:26





  Spironolactone 25 Mg Tab  PO   25 mg





  QDAY SHAY   Administration














Nutrition/Malnutrition Assess





- Dietary Evaluation


Nutrition/Malnutrition Findings: 


                                        





Nutrition Notes                                            Start:  12/31/21 

19:00


Freq:                                                      Status: Active       




Protocol:                                                                       




 Document     01/10/22 17:58  ZACHARY  (Rec: 01/10/22 18:18  ZACHARY  [id number])


 Nutrition Notes


     Initial or Follow up                        Brief Note


     Current Diet                                Pureed Diet (since B 01/11).


     Height                                      5 ft 6 in


     Weight                                      71.7 kg


     Ideal Body Weight (kg)                      64.54


     BMI                                         25.4


     Weight change and time frame                Discrepancy of 6.3 Kg body


                                                 weight loss in 3 days reported


                                                 .


     Weight Status                               Appropriate


     Subjective/Other Information                RD consult for routine F/U on


                                                 dietary assessment.


                                                 No report available on PO


                                                 intake of meals at the time.


                                                 MD ordered NPO until SLP


                                                 evaluation; now Pt is on PO,


                                                 nut no report of SLP


                                                 evaluation available at the


                                                 time.


                                                 No report on changes of


                                                 decubitus pressure ulcer.


     Percent of energy/protein needs met:        Prescribed Pureed Diet


                                                 provides for energy/protein


                                                 needs (1,804 Kcal/77 g) during


                                                 LOS.


     #1


      Nutrition Diagnosis                        Increased nutrient needs   (


                                                 specify in comment below)


      Diagnosis Progress(for reassessment        Continues


       documentation)                            


     Is patient on ventilator?                   No


     Is Patient Ambulatory and/or Out of Bed     No


     REE-(Pocahontas-St. Jeor-confined to bed)      1668.228


     Kcal/Kg value to use for calculation        20


     Approximate Energy Requirements Using       1434


      kcal/Kg                                    


     Calculation Used for Recommendations        Kcal/kg


     Additional Notes                            Protein: 1-1.2 g/Kg; 72-86 g/


                                                 day.


                                                 Fluids: 1 ml/Kcal, or as per


                                                 MD.


 Nutrition Intervention


     Change Diet Order:                          Continue Pureed Diet.


     Add Supplement/Snack (indicate name/kcal    Continue 8 fl oz Nepro w/


      /protein )                                 Carbsteady; TID.


                                                 28.8 g pkt Arturo; BID.


     Provides kCal:                              1,465


     Provides Protein (gm)                       62


     Goal #1                                     Support, through dietary


                                                 supplementation, wound healing


                                                 processes during LOS.


     Follow-Up By:                               01/17/22


     Additional Comments                         Continue monitoring food


                                                 tolerance, %PO intake of meals


                                                 , and BM.
Assessment and Plan


Assessment and plan: 








76-year-old  male with history of hypertension CVA dementia  right hip fracture 

and a chronically nonambulatory and wheelchair-bound cared by his female partner

who is presenting with leg swelling.





Sepsis.  Not present on admission 





anasarca





Left DVT





NSVT





Acute hypoxic respiratory failure





Hypokalemia and hypomagnesemia





CVA, nonambulatory chronically, wheelchair-bound





Pleasantly demented/disoriented





Hypertension





Hypernatremia





Rule out nephrotic syndrome











Daily events


12/31: Follow up echo. Will hold off on lasix at this time due to low K. Replete

K with KCL IV (ordered 8 bags). May need albumin/lasix per nephrology. Follow up

diagnostic studies and cardiology recommendations.





01/01/22: Mild diastolic dysfunction, LVEF 55-65% per Echo read. Continue 

diuresis with spironalactone. Strict I/O's. May consider albumin/lasix if 

diuresis not adequate. Will continue to follow nephrology and cardiology 

recommendations. Anticipate dc in next 24-48 hrs. 





01/02/22: administered albumin + lasix 40 IV x 1.





01/03/22: albumin lasix ordered. US of lower extremity demonstrates DVT in 

popliteal vein. Initiated on heparin gtt.





1/4/2022: Transition from heparin drip to oral anticoagulation if okay with 

nephrology.  Hypokalemia improved.  Nephrology following.  At this time 

urinalysis does not show any evidence of blood only 30 mg of protein.  Albumin 

however is 2.5 which is very concerning rule out other causes for malnutrition/?

 Liver disease, bilirubin noted to be elevated





1/5/2022: We will transition from heparin drip to Eliquis.  Nephrology to check 

magnesium and BMP.  Nurse reports patient has very poor p.o. intake.  Will co

nsult dietitian for further evaluation.





1/5/2022:  Continue eliquis.  Nutritionist added supplements with Nepro and 

Arturo twice daily.  Acute kidney injury hyponatremia has resolved.  Chest x-ray 

shows no acute findings.  Anticipate discharge in a.m.





1/6/2022.  Patient's oxygen requirement has been increased to 15 L on Venturi 

mask with FiO2 of 50%.  We will check CTA of the chest to rule out PE especially

given left DVT and respiratory compromise.  Patient was on 2 L satting at 97% 

yesterday.  Pulmonary consultation





1/7/2022.  CT of the chest revealed right upper lobe and middle lobe pneumonia 

that may represent aspiration.  We will consult speech therapy for further 

evaluation.  Continue O2 to maintain sats greater than 92%.  We will start Zosyn

IV and consult ID for further evaluation





1/8/2022.  1/8/2022.  Continue IV antibiotics of Zosyn and follow-up cultures.  

Continue O2 to maintain sats greater than 92%.  Speech therapy evaluation 

pending.  Patient with sepsis not present on admission as evident by 

leukocytosis, tachycardia, tachypnea and diagnosis of probable aspiration 

pneumonia.





1/9/2022.  Patient currently requiring 3 L O2 via nasal cannula.  Patient much 

more alert and oriented this morning.  Leukocytosis resolved.  Patient appears 

to be tolerating diet this morning but I informed the nurse to discontinue diet 

for now and have formal speech evaluation.  Keep patient n.p.o. until cleared by

speech.  Patient may be silently aspirating.





1/10/2022.  Patient currently requiring 3 L O2 via nasal cannula.  Continue IV 

antibiotics for probable aspiration pneumonia.  Await speech therapy evaluation.

 PT evaluation.





1/11/2022: Patient is confused but follows commands. Has history of CVA and 

likely has a underlying dementia. Will discontinue narcotics and monitor for 

mental status improvement. O2 needs improved from a 15 to 3 L. On Zosyn for 

aspiration ammonia and Eliquis for lower extremity DVT. Cardiology following for

NSVT. Nephrology seeing as needed since renal function improved. 





1/12: Patient is clinically improving, hypoxia improving, O2 being weaned, will 

be discharged back to home when stabilized.





1/13: Clinical improvement, weaning O2, on 3 L O2, treating hypokalemia, 

hypomagnesemia and hyponatremia.  Will be discharged when more stabilized to 

home.








History


Interval history: 





Patient is confused but Conversational follows commands. O2 needs improved from 

a 15 to 3 L. On antibiotics for aspiration pneumonia cardiology following for 

NSVT.  Patient has no complaints..  .  He is tolerating diet.  Treating for 

hyperkalemia and hypernatremia.











Hospitalist Physical





- Constitutional


Vitals: 


                                        











Temp Pulse Resp BP Pulse Ox


 


 98.3 F   76   20   118/53   96 


 


 01/13/22 15:46  01/13/22 15:46  01/13/22 15:46  01/13/22 15:46  01/13/22 15:46











General appearance: Present: no acute distress, disheveled, other (Debilitated, 

alert but confused)





- EENT


Eyes: Present: PERRL, EOM intact


ENT: hearing intact





- Neck


Neck: Present: supple





- Respiratory


Respiratory effort: normal


Respiratory: bilateral: diminished





- Cardiovascular


Rhythm: regular





- Extremities


Extremities: No edema (Stasis changes in the lower extremities noted.)





- Abdominal


General gastrointestinal: soft, non-tender, non-distended





- Integumentary


Integumentary: Present: rash (Stasis changes in lower extremities)





- Psychiatric


Psychiatric: cooperative





- Neurologic


Neurologic: other (Alert, clear speech, disoriented likely from dementia, 

chronically nonambulatory.)





Results





- Labs


CBC & Chem 7: 


                                 01/12/22 07:53





                                 01/14/22 09:02


Labs: 


                             Laboratory Last Values











WBC  7.3 K/mm3 (4.5-11.0)   01/11/22  15:24    


 


RBC  2.97 M/mm3 (3.65-5.03)  L  01/11/22  15:24    


 


Hgb  9.1 gm/dl (11.8-15.2)  L  01/12/22  07:53    


 


Hct  29.1 % (35.5-45.6)  L  01/12/22  07:53    


 


MCV  88 fl (84-94)   01/11/22  15:24    


 


MCH  28 pg (28-32)   01/11/22  15:24    


 


MCHC  32 % (32-34)   01/11/22  15:24    


 


RDW  19.9 % (13.2-15.2)  H  01/11/22  15:24    


 


Plt Count  210 K/mm3 (140-440)   01/12/22  07:53    


 


Lymph % (Auto)  20.0 % (13.4-35.0)   01/10/22  15:06    


 


Mono % (Auto)  12.3 % (0.0-7.3)  H  01/10/22  15:06    


 


Eos % (Auto)  0.3 % (0.0-4.3)   01/10/22  15:06    


 


Baso % (Auto)  0.4 % (0.0-1.8)   01/10/22  15:06    


 


Lymph # (Auto)  1.7 K/mm3 (1.2-5.4)   01/10/22  15:06    


 


Mono # (Auto)  1.0 K/mm3 (0.0-0.8)  H  01/10/22  15:06    


 


Eos # (Auto)  0.0 K/mm3 (0.0-0.4)   01/10/22  15:06    


 


Baso # (Auto)  0.0 K/mm3 (0.0-0.1)   01/10/22  15:06    


 


Add Manual Diff  Complete   01/01/22  11:15    


 


Total Counted  100   01/01/22  11:15    


 


Seg Neutrophils %  67.0 % (40.0-70.0)   01/10/22  15:06    


 


Seg Neuts % (Manual)  72.0 % (40.0-70.0)  H  01/01/22  11:15    


 


Lymphocytes % (Manual)  24.0 % (13.4-35.0)   01/01/22  11:15    


 


Monocytes % (Manual)  4.0 % (0.0-7.3)   01/01/22  11:15    


 


Nucleated RBC %  Not Reportable   01/01/22  11:15    


 


Seg Neutrophils #  5.6 K/mm3 (1.8-7.7)   01/10/22  15:06    


 


Seg Neutrophils # Man  4.2 K/mm3 (1.8-7.7)   01/01/22  11:15    


 


Band Neutrophils #  0.0 K/mm3  01/01/22  11:15    


 


Lymphocytes # (Manual)  1.4 K/mm3 (1.2-5.4)   01/01/22  11:15    


 


Abs React Lymphs (Man)  0.0 K/mm3  01/01/22  11:15    


 


Monocytes # (Manual)  0.2 K/mm3 (0.0-0.8)   01/01/22  11:15    


 


Eosinophils # (Manual)  0.0 K/mm3 (0.0-0.4)   01/01/22  11:15    


 


Basophils # (Manual)  0.0 K/mm3 (0.0-0.1)   01/01/22  11:15    


 


Metamyelocytes #  0.0 K/mm3  01/01/22  11:15    


 


Myelocytes #  0.0 K/mm3  01/01/22  11:15    


 


Promyelocytes #  0.0 K/mm3  01/01/22  11:15    


 


Blast Cells #  0.0 K/mm3  01/01/22  11:15    


 


WBC Morphology  Not Reportable   01/01/22  11:15    


 


Hypersegmented Neuts  Not Reportable   01/01/22  11:15    


 


Hyposegmented Neuts  Not Reportable   01/01/22  11:15    


 


Hypogranular Neuts  Not Reportable   01/01/22  11:15    


 


Smudge Cells  Not Reportable   01/01/22  11:15    


 


Toxic Granulation  Not Reportable   01/01/22  11:15    


 


Toxic Vacuolation  Not Reportable   01/01/22  11:15    


 


Dohle Bodies  Not Reportable   01/01/22  11:15    


 


Pelger-Huet Anomaly  Not Reportable   01/01/22  11:15    


 


Shamir Rods  Not Reportable   01/01/22  11:15    


 


Platelet Estimate  Consistent w auto   01/01/22  11:15    


 


Clumped Platelets  Not Reportable   01/01/22  11:15    


 


Plt Clumps, EDTA  Not Reportable   01/01/22  11:15    


 


Large Platelets  Not Reportable   01/01/22  11:15    


 


Giant Platelets  Not Reportable   01/01/22  11:15    


 


Platelet Satelliting  Not Reportable   01/01/22  11:15    


 


Plt Morphology Comment  Not Reportable   01/01/22  11:15    


 


RBC Morphology  Not Reportable   01/01/22  11:15    


 


Dimorphic RBCs  Not Reportable   01/01/22  11:15    


 


Polychromasia  Not Reportable   01/01/22  11:15    


 


Hypochromasia  Not Reportable   01/01/22  11:15    


 


Poikilocytosis  Not Reportable   01/01/22  11:15    


 


Anisocytosis  Not Reportable   01/01/22  11:15    


 


Microcytosis  Not Reportable   01/01/22  11:15    


 


Macrocytosis  Not Reportable   01/01/22  11:15    


 


Spherocytes  Not Reportable   01/01/22  11:15    


 


Pappenheimer Bodies  Not Reportable   01/01/22  11:15    


 


Sickle Cells  Not Reportable   01/01/22  11:15    


 


Target Cells  Not Reportable   01/01/22  11:15    


 


Tear Drop Cells  Not Reportable   01/01/22  11:15    


 


Ovalocytes  Not Reportable   01/01/22  11:15    


 


Helmet Cells  Not Reportable   01/01/22  11:15    


 


Cuevas-Normanna Bodies  Not Reportable   01/01/22  11:15    


 


Cabot Rings  Not Reportable   01/01/22  11:15    


 


Glendale Cells  Not Reportable   01/01/22  11:15    


 


Bite Cells  Not Reportable   01/01/22  11:15    


 


Crenated Cell  Not Reportable   01/01/22  11:15    


 


Elliptocytes  Not Reportable   01/01/22  11:15    


 


Acanthocytes (Spur)  Not Reportable   01/01/22  11:15    


 


Rouleaux  Not Reportable   01/01/22  11:15    


 


Hemoglobin C Crystals  Not Reportable   01/01/22  11:15    


 


Schistocytes  Not Reportable   01/01/22  11:15    


 


Malaria parasites  Not Reportable   01/01/22  11:15    


 


Shaggy Bodies  Not Reportable   01/01/22  11:15    


 


Hem Pathologist Commnt  No   01/01/22  11:15    


 


PT  14.5 Sec. (12.2-14.9)   01/05/22  14:48    


 


INR  1.02  (0.87-1.13)   01/05/22  14:48    


 


APTT  33.1 Sec. (24.2-36.6)   01/05/22  14:48    


 


Heparin Anti-Xa Level  < 0.10 U.I./ml (0.3-0.7)  L  01/05/22  14:48    


 


ABG pH  7.431 pH Units (7.350-7.450)   01/06/22  12:30    


 


ABG pCO2  49.5 mm Hg  01/06/22  12:30    


 


ABG pO2  25.2 mm Hg (80.0-90.0)  L*  01/06/22  12:30    


 


ABG HCO3  32.2 mmol/L (20.0-26.0)  H  01/06/22  12:30    


 


ABG O2 Saturation  41.1 % (95.0-99.0)  L  01/06/22  12:30    


 


ABG O2 Content  9.4  (0.0-44)   01/06/22  12:30    


 


ABG Base Excess  6.6 mmol/L (-2.0-3.0)  H  01/06/22  12:30    


 


ABG Hemoglobin  10.2 gm/dl (14.0-18.0)  L  01/06/22  12:30    


 


ABG Carboxyhemoglobin  1.7 % (0.0-5.0)   01/06/22  12:30    


 


ABG Methemoglobin  0.8 % (0.0-1.5)   01/06/22  12:30    


 


Oxyhemoglobin  40.1 % (95.0-99.0)  L  01/06/22  12:30    


 


FiO2  28 %  01/06/22  12:30    


 


Sodium  151 mmol/L (137-145)  H  01/13/22  12:35    


 


Potassium  3.2 mmol/L (3.6-5.0)  L  01/13/22  12:35    


 


Chloride  113.9 mmol/L ()  H  01/13/22  12:35    


 


Carbon Dioxide  24 mmol/L (22-30)   01/13/22  12:35    


 


Anion Gap  16 mmol/L  01/13/22  12:35    


 


BUN  12 mg/dL (9-20)   01/13/22  12:35    


 


Creatinine  0.6 mg/dL (0.8-1.3)  L  01/13/22  12:35    


 


Estimated GFR  > 60 ml/min  01/13/22  12:35    


 


BUN/Creatinine Ratio  20 %  01/13/22  12:35    


 


Glucose  86 mg/dL ()   01/13/22  12:35    


 


POC Glucose  80 mg/dL ()   01/09/22  19:10    


 


Calcium  8.1 mg/dL (8.4-10.2)  L  01/13/22  12:35    


 


Magnesium  1.70 mg/dL (1.7-2.3)   01/05/22  14:48    


 


Total Bilirubin  1.10 mg/dL (0.1-1.2)   01/09/22  13:11    


 


AST  23 units/L (5-40)   01/09/22  13:11    


 


ALT  15 units/L (7-56)   01/09/22  13:11    


 


Alkaline Phosphatase  129 units/L ()   01/09/22  13:11    


 


NT-Pro-B Natriuret Pep  1300 pg/mL (0-900)  H  12/30/21  23:51    


 


Total Protein  5.7 g/dL (6.3-8.2)  L  01/09/22  13:11    


 


Albumin  2.5 g/dL (3.9-5)  L  01/09/22  13:11    


 


Albumin/Globulin Ratio  0.8 %  01/09/22  13:11    


 


Renin  0.80 ng/mL/h (0.25-5.82)   01/05/22  02:23    


 


Aldosterone  <1 ng/dL (***)   01/05/22  02:23    


 


Aldosterone/Renin Dir  see below   01/05/22  02:23    


 


TSH  2.930 mlU/mL (0.270-4.200)   01/03/22  00:06    


 


Total Cortisol  40.5 mcg/dL (***)   01/03/22  00:06    


 


Urine Color  Maren  (Yellow)   12/31/21  01:00    


 


Urine Turbidity  Slightly-cloudy  (Clear)   12/31/21  01:00    


 


Urine pH  6.0  (5.0-7.0)   12/31/21  01:00    


 


Ur Specific Gravity  1.012  (1.003-1.030)   12/31/21  01:00    


 


Urine Protein  30 mg/dl mg/dL (Negative)   12/31/21  01:00    


 


Urine Glucose (UA)  Neg mg/dL (Negative)   12/31/21  01:00    


 


Urine Ketones  Neg mg/dL (Negative)   12/31/21  01:00    


 


Urine Blood  Neg  (Negative)   12/31/21  01:00    


 


Urine Nitrite  Neg  (Negative)   12/31/21  01:00    


 


Urine Bilirubin  Neg  (Negative)   12/31/21  01:00    


 


Urine Urobilinogen  < 2.0 mg/dL (<2.0)   12/31/21  01:00    


 


Ur Leukocyte Esterase  Neg  (Negative)   12/31/21  01:00    


 


Urine WBC (Auto)  9.0 /HPF (0.0-6.0)  H  12/31/21  01:00    


 


Urine RBC (Auto)  3.0 /HPF (0.0-6.0)   12/31/21  01:00    


 


U Epithel Cells (Auto)  < 1.0 /HPF (0-13.0)   12/31/21  01:00    


 


Hyaline Casts  8 /LPF  12/31/21  01:00    


 


Urine Mucus  Few /HPF  12/31/21  01:00    











Acosta/IV: 


                                        





Voiding Method                   Condom Catheter











Active Medications





- Current Medications


Current Medications: 














Generic Name Dose Route Start Last Admin





  Trade Name Freq  PRN Reason Stop Dose Admin


 


Acetaminophen  650 mg  12/31/21 03:51  01/02/22 22:07





  Acetaminophen 325 Mg Tab  PO   650 mg





  Q4H PRN   Administration





  Pain MILD(1-3)/Fever >100.5/HA  


 


Albuterol  2.5 mg  01/04/22 12:00 





  Albuterol 2.5 Mg/3 Ml Nebu  IH  





  Q4HRT PRN  





  Shortness Of Breath  


 


Albuterol/Ipratropium  1 ampul  01/04/22 14:00  01/13/22 15:07





  Ipratropium/Albuterol Sulfate 3 Ml Ampul.Neb  IH   1 ampul





  TIDRT SHAY   Administration


 


Apixaban  5 mg  01/12/22 10:00  01/13/22 09:29





  Apixaban 5 Mg Tab  PO   5 mg





  Q12HR SHAY   Administration





  Protocol  


 


Famotidine  10 mg  12/31/21 10:00  01/13/22 09:29





  Famotidine 10 Mg Tab  PO   10 mg





  BID SHAY   Administration


 


Dextrose/Sodium Chloride  1,000 mls @ 75 mls/hr  01/09/22 20:00  01/13/22 04:05





  D5ns  IV   75 mls/hr





  AS DIRECT SHAY   Administration


 


Metoprolol Tartrate  12.5 mg  01/03/22 22:00  01/13/22 09:29





  Metoprolol Tartrate 25 Mg Tab  PO   12.5 mg





  BID SHAY   Administration


 


Ondansetron HCl  4 mg  12/31/21 03:51 





  Ondansetron 4 Mg/2 Ml Inj  IV  





  Q8H PRN  





  Nausea And Vomiting  


 


Sodium Chloride  10 ml  12/31/21 10:00  01/13/22 09:30





  Sodium Chloride 0.9% 10 Ml Flush Syringe  IV   10 ml





  BID SHAY   Administration


 


Sodium Chloride  10 ml  12/31/21 03:51 





  Sodium Chloride 0.9% 10 Ml Flush Syringe  IV  





  PRN PRN  





  LINE FLUSH  


 


Spironolactone  25 mg  12/31/21 16:00  01/13/22 09:30





  Spironolactone 25 Mg Tab  PO   25 mg





  QDAY SHAY   Administration














Nutrition/Malnutrition Assess





- Dietary Evaluation


Nutrition/Malnutrition Findings: 


                                        





Nutrition Notes                                            Start:  12/31/21 

19:00


Freq:                                                      Status: Active       




Protocol:                                                                       




 Document     01/10/22 17:58  ZACHARY  (Rec: 01/10/22 18:18  ZACHARY  [id number])


 Nutrition Notes


     Initial or Follow up                        Brief Note


     Current Diet                                Pureed Diet (since B 01/11).


     Height                                      5 ft 6 in


     Weight                                      71.7 kg


     Ideal Body Weight (kg)                      64.54


     BMI                                         25.4


     Weight change and time frame                Discrepancy of 6.3 Kg body


                                                 weight loss in 3 days reported


                                                 .


     Weight Status                               Appropriate


     Subjective/Other Information                RD consult for routine F/U on


                                                 dietary assessment.


                                                 No report available on PO


                                                 intake of meals at the time.


                                                 MD ordered NPO until SLP


                                                 evaluation; now Pt is on PO,


                                                 nut no report of SLP


                                                 evaluation available at the


                                                 time.


                                                 No report on changes of


                                                 decubitus pressure ulcer.


     Percent of energy/protein needs met:        Prescribed Pureed Diet


                                                 provides for energy/protein


                                                 needs (1,804 Kcal/77 g) during


                                                 LOS.


     #1


      Nutrition Diagnosis                        Increased nutrient needs   (


                                                 specify in comment below)


      Diagnosis Progress(for reassessment        Continues


       documentation)                            


     Is patient on ventilator?                   No


     Is Patient Ambulatory and/or Out of Bed     No


     REE-([facility]-confined to bed)      [phone]


     Kcal/Kg value to use for calculation        20


     Approximate Energy Requirements Using       1434


      kcal/Kg                                    


     Calculation Used for Recommendations        Kcal/kg


     Additional Notes                            Protein: 1-1.2 g/Kg; 72-86 g/


                                                 day.


                                                 Fluids: 1 ml/Kcal, or as per


                                                 MD.


 Nutrition Intervention


     Change Diet Order:                          Continue Pureed Diet.


     Add Supplement/Snack (indicate name/kcal    Continue 8 fl oz Nepro w/


      /protein )                                 Carbsteady; TID.


                                                 28.8 g pkt Arturo; BID.


     Provides kCal:                              1,465


     Provides Protein (gm)                       62


     Goal #1                                     Support, through dietary


                                                 supplementation, wound healing


                                                 processes during LOS.


     Follow-Up By:                               01/17/22


     Additional Comments                         Continue monitoring food


                                                 tolerance, %PO intake of meals


                                                 , and BM.
Assessment and Plan


Assessment and plan: 








76-year-old  male with history of hypertension CVA dementia  right hip fracture 

and a chronically nonambulatory and wheelchair-bound cared by his female partner

who is presenting with leg swelling.





Sepsis.  Not present on admission 





anasarca





Left DVT





NSVT





Acute hypoxic respiratory failure





Hypokalemia and hypomagnesemia





CVA, nonambulatory chronically, wheelchair-bound





Pleasantly demented/disoriented





Hypertension





Hypernatremia





Rule out nephrotic syndrome





Disposition: Lives at home with a female partner who is his caregiver.  Expected

to return to home.











Daily events


12/31: Follow up echo. Will hold off on lasix at this time due to low K. Replete

K with KCL IV (ordered 8 bags). May need albumin/lasix per nephrology. Follow up

diagnostic studies and cardiology recommendations.





01/01/22: Mild diastolic dysfunction, LVEF 55-65% per Echo read. Continue 

diuresis with spironalactone. Strict I/O's. May consider albumin/lasix if 

diuresis not adequate. Will continue to follow nephrology and cardiology 

recommendations. Anticipate dc in next 24-48 hrs. 





01/02/22: administered albumin + lasix 40 IV x 1.





01/03/22: albumin lasix ordered. US of lower extremity demonstrates DVT in popli

teal vein. Initiated on heparin gtt.





1/4/2022: Transition from heparin drip to oral anticoagulation if okay with 

nephrology.  Hypokalemia improved.  Nephrology following.  At this time 

urinalysis does not show any evidence of blood only 30 mg of protein.  Albumin 

however is 2.5 which is very concerning rule out other causes for malnutrition/?

 Liver disease, bilirubin noted to be elevated





1/5/2022: We will transition from heparin drip to Eliquis.  Nephrology to check 

magnesium and BMP.  Nurse reports patient has very poor p.o. intake.  Will 

consult dietitian for further evaluation.





1/5/2022:  Continue eliquis.  Nutritionist added supplements with Nepro and 

Arturo twice daily.  Acute kidney injury hyponatremia has resolved.  Chest x-ray 

shows no acute findings.  Anticipate discharge in a.m.





1/6/2022.  Patient's oxygen requirement has been increased to 15 L on Venturi 

mask with FiO2 of 50%.  We will check CTA of the chest to rule out PE especially

given left DVT and respiratory compromise.  Patient was on 2 L satting at 97% 

yesterday.  Pulmonary consultation





1/7/2022.  CT of the chest revealed right upper lobe and middle lobe pneumonia 

that may represent aspiration.  We will consult speech therapy for further 

evaluation.  Continue O2 to maintain sats greater than 92%.  We will start Zosyn

IV and consult ID for further evaluation





1/8/2022.  1/8/2022.  Continue IV antibiotics of Zosyn and follow-up cultures.  

Continue O2 to maintain sats greater than 92%.  Speech therapy evaluation 

pending.  Patient with sepsis not present on admission as evident by 

leukocytosis, tachycardia, tachypnea and diagnosis of probable aspiration 

pneumonia.





1/9/2022.  Patient currently requiring 3 L O2 via nasal cannula.  Patient much 

more alert and oriented this morning.  Leukocytosis resolved.  Patient appears 

to be tolerating diet this morning but I informed the nurse to discontinue diet 

for now and have formal speech evaluation.  Keep patient n.p.o. until cleared by

speech.  Patient may be silently aspirating.





1/10/2022.  Patient currently requiring 3 L O2 via nasal cannula.  Continue IV 

antibiotics for probable aspiration pneumonia.  Await speech therapy evaluation.

 PT evaluation.





1/11/2022: Patient is confused but follows commands. Has history of CVA and 

likely has a underlying dementia. Will discontinue narcotics and monitor for 

mental status improvement. O2 needs improved from a 15 to 3 L. On Zosyn for 

aspiration ammonia and Eliquis for lower extremity DVT. Cardiology following for

NSVT. Nephrology seeing as needed since renal function improved. 





1/12: Patient is clinically improving, hypoxia improving, O2 being weaned, will 

be discharged back to home when stabilized.





1/13: Clinical improvement, weaning O2, on 3 L O2, treating hypokalemia, 

hypomagnesemia and hyponatremia.  Will be discharged when more stabilized to 

home.





1/14: Patient clinically improved, O2 improvement today, discharge today 

deferred due to multiple electrolyte abnormalities including hypernatremia, 

hypokalemia and hypomagnesemia.  Hopefully could be discharged soon once 

electrolytes stabilized.  Discussed with the nursing staff and case management








History


Interval history: 





Patient remains stable, alert, conversational but disoriented from dementia.  O2

weaned to room air today.  Discharge was planned for today but deferred due to 

multiple electrolyte abnormalities including hyponatremia, hypokalemia and 

hypomagnesemia.








Hospitalist Physical





- Constitutional


Vitals: 


                                        











Temp Pulse Resp BP Pulse Ox


 


 97.3 F L  76   18   112/52   94 


 


 01/14/22 15:40  01/14/22 15:40  01/14/22 15:40  01/14/22 15:40  01/14/22 15:40











General appearance: Present: no acute distress, other (Awake, verbal but 

disoriented/confused)





- EENT


Eyes: Present: PERRL, EOM intact


ENT: hearing intact, clear oral mucosa





- Neck


Neck: Present: supple





- Respiratory


Respiratory effort: normal


Respiratory: bilateral: diminished





- Cardiovascular


Rhythm: regular





- Extremities


Extremities: No edema (Chronic stasis changes in lower extremities)





- Abdominal


General gastrointestinal: soft, non-tender, non-distended





- Integumentary


Integumentary: Present: rash (Chronic stasis changes in lower extremities with 

some healed scabs.)





- Psychiatric


Psychiatric: cooperative





- Neurologic


Neurologic: other (Alert fluent speech but confused/disoriented likely from mild

dementia.  Chronically nonambulatory.  Wheelchair-bound.)





Results





- Labs


CBC & Chem 7: 


                                 01/12/22 07:53





                                 01/14/22 09:02


Labs: 


                             Laboratory Last Values











WBC  7.3 K/mm3 (4.5-11.0)   01/11/22  15:24    


 


RBC  2.97 M/mm3 (3.65-5.03)  L  01/11/22  15:24    


 


Hgb  9.1 gm/dl (11.8-15.2)  L  01/12/22  07:53    


 


Hct  29.1 % (35.5-45.6)  L  01/12/22  07:53    


 


MCV  88 fl (84-94)   01/11/22  15:24    


 


MCH  28 pg (28-32)   01/11/22  15:24    


 


MCHC  32 % (32-34)   01/11/22  15:24    


 


RDW  19.9 % (13.2-15.2)  H  01/11/22  15:24    


 


Plt Count  210 K/mm3 (140-440)   01/12/22  07:53    


 


Lymph % (Auto)  20.0 % (13.4-35.0)   01/10/22  15:06    


 


Mono % (Auto)  12.3 % (0.0-7.3)  H  01/10/22  15:06    


 


Eos % (Auto)  0.3 % (0.0-4.3)   01/10/22  15:06    


 


Baso % (Auto)  0.4 % (0.0-1.8)   01/10/22  15:06    


 


Lymph # (Auto)  1.7 K/mm3 (1.2-5.4)   01/10/22  15:06    


 


Mono # (Auto)  1.0 K/mm3 (0.0-0.8)  H  01/10/22  15:06    


 


Eos # (Auto)  0.0 K/mm3 (0.0-0.4)   01/10/22  15:06    


 


Baso # (Auto)  0.0 K/mm3 (0.0-0.1)   01/10/22  15:06    


 


Add Manual Diff  Complete   01/01/22  11:15    


 


Total Counted  100   01/01/22  11:15    


 


Seg Neutrophils %  67.0 % (40.0-70.0)   01/10/22  15:06    


 


Seg Neuts % (Manual)  72.0 % (40.0-70.0)  H  01/01/22  11:15    


 


Lymphocytes % (Manual)  24.0 % (13.4-35.0)   01/01/22  11:15    


 


Monocytes % (Manual)  4.0 % (0.0-7.3)   01/01/22  11:15    


 


Nucleated RBC %  Not Reportable   01/01/22  11:15    


 


Seg Neutrophils #  5.6 K/mm3 (1.8-7.7)   01/10/22  15:06    


 


Seg Neutrophils # Man  4.2 K/mm3 (1.8-7.7)   01/01/22  11:15    


 


Band Neutrophils #  0.0 K/mm3  01/01/22  11:15    


 


Lymphocytes # (Manual)  1.4 K/mm3 (1.2-5.4)   01/01/22  11:15    


 


Abs React Lymphs (Man)  0.0 K/mm3  01/01/22  11:15    


 


Monocytes # (Manual)  0.2 K/mm3 (0.0-0.8)   01/01/22  11:15    


 


Eosinophils # (Manual)  0.0 K/mm3 (0.0-0.4)   01/01/22  11:15    


 


Basophils # (Manual)  0.0 K/mm3 (0.0-0.1)   01/01/22  11:15    


 


Metamyelocytes #  0.0 K/mm3  01/01/22  11:15    


 


Myelocytes #  0.0 K/mm3  01/01/22  11:15    


 


Promyelocytes #  0.0 K/mm3  01/01/22  11:15    


 


Blast Cells #  0.0 K/mm3  01/01/22  11:15    


 


WBC Morphology  Not Reportable   01/01/22  11:15    


 


Hypersegmented Neuts  Not Reportable   01/01/22  11:15    


 


Hyposegmented Neuts  Not Reportable   01/01/22  11:15    


 


Hypogranular Neuts  Not Reportable   01/01/22  11:15    


 


Smudge Cells  Not Reportable   01/01/22  11:15    


 


Toxic Granulation  Not Reportable   01/01/22  11:15    


 


Toxic Vacuolation  Not Reportable   01/01/22  11:15    


 


Dohle Bodies  Not Reportable   01/01/22  11:15    


 


Pelger-Huet Anomaly  Not Reportable   01/01/22  11:15    


 


Shamir Rods  Not Reportable   01/01/22  11:15    


 


Platelet Estimate  Consistent w auto   01/01/22  11:15    


 


Clumped Platelets  Not Reportable   01/01/22  11:15    


 


Plt Clumps, EDTA  Not Reportable   01/01/22  11:15    


 


Large Platelets  Not Reportable   01/01/22  11:15    


 


Giant Platelets  Not Reportable   01/01/22  11:15    


 


Platelet Satelliting  Not Reportable   01/01/22  11:15    


 


Plt Morphology Comment  Not Reportable   01/01/22  11:15    


 


RBC Morphology  Not Reportable   01/01/22  11:15    


 


Dimorphic RBCs  Not Reportable   01/01/22  11:15    


 


Polychromasia  Not Reportable   01/01/22  11:15    


 


Hypochromasia  Not Reportable   01/01/22  11:15    


 


Poikilocytosis  Not Reportable   01/01/22  11:15    


 


Anisocytosis  Not Reportable   01/01/22  11:15    


 


Microcytosis  Not Reportable   01/01/22  11:15    


 


Macrocytosis  Not Reportable   01/01/22  11:15    


 


Spherocytes  Not Reportable   01/01/22  11:15    


 


Pappenheimer Bodies  Not Reportable   01/01/22  11:15    


 


Sickle Cells  Not Reportable   01/01/22  11:15    


 


Target Cells  Not Reportable   01/01/22  11:15    


 


Tear Drop Cells  Not Reportable   01/01/22  11:15    


 


Ovalocytes  Not Reportable   01/01/22  11:15    


 


Helmet Cells  Not Reportable   01/01/22  11:15    


 


Cuevas-[place] Bodies  Not Reportable   01/01/22  11:15    


 


Cabot Rings  Not Reportable   01/01/22  11:15    


 


[place] Cells  Not Reportable   01/01/22  11:15    


 


Bite Cells  Not Reportable   01/01/22  11:15    


 


Crenated Cell  Not Reportable   01/01/22  11:15    


 


Elliptocytes  Not Reportable   01/01/22  11:15    


 


Acanthocytes (Spur)  Not Reportable   01/01/22  11:15    


 


Rouleaux  Not Reportable   01/01/22  11:15    


 


Hemoglobin C Crystals  Not Reportable   01/01/22  11:15    


 


Schistocytes  Not Reportable   01/01/22  11:15    


 


Malaria parasites  Not Reportable   01/01/22  11:15    


 


Shaggy Bodies  Not Reportable   01/01/22  11:15    


 


Hem Pathologist Commnt  No   01/01/22  11:15    


 


PT  14.5 Sec. (12.2-14.9)   01/05/22  14:48    


 


INR  1.02  (0.87-1.13)   01/05/22  14:48    


 


APTT  33.1 Sec. (24.2-36.6)   01/05/22  14:48    


 


Heparin Anti-Xa Level  < 0.10 U.I./ml (0.3-0.7)  L  01/05/22  14:48    


 


ABG pH  7.431 pH Units (7.350-7.450)   01/06/22  12:30    


 


ABG pCO2  49.5 mm Hg  01/06/22  12:30    


 


ABG pO2  25.2 mm Hg (80.0-90.0)  L*  01/06/22  12:30    


 


ABG HCO3  32.2 mmol/L (20.0-26.0)  H  01/06/22  12:30    


 


ABG O2 Saturation  41.1 % (95.0-99.0)  L  01/06/22  12:30    


 


ABG O2 Content  9.4  (0.0-44)   01/06/22  12:30    


 


ABG Base Excess  6.6 mmol/L (-2.0-3.0)  H  01/06/22  12:30    


 


ABG Hemoglobin  10.2 gm/dl (14.0-18.0)  L  01/06/22  12:30    


 


ABG Carboxyhemoglobin  1.7 % (0.0-5.0)   01/06/22  12:30    


 


ABG Methemoglobin  0.8 % (0.0-1.5)   01/06/22  12:30    


 


Oxyhemoglobin  40.1 % (95.0-99.0)  L  01/06/22  12:30    


 


FiO2  28 %  01/06/22  12:30    


 


Sodium  149 mmol/L (137-145)  H  01/14/22  09:02    


 


Potassium  2.7 mmol/L (3.6-5.0)  L*  01/14/22  09:02    


 


Chloride  114.6 mmol/L ()  H  01/14/22  09:02    


 


Carbon Dioxide  24 mmol/L (22-30)   01/14/22  09:02    


 


Anion Gap  13 mmol/L  01/14/22  09:02    


 


BUN  9 mg/dL (9-20)   01/14/22  09:02    


 


Creatinine  0.6 mg/dL (0.8-1.3)  L  01/14/22  09:02    


 


Estimated GFR  > 60 ml/min  01/14/22  09:02    


 


BUN/Creatinine Ratio  15 %  01/14/22  09:02    


 


Glucose  100 mg/dL ()   01/14/22  09:02    


 


POC Glucose  80 mg/dL ()   01/09/22  19:10    


 


Calcium  8.1 mg/dL (8.4-10.2)  L  01/14/22  09:02    


 


Magnesium  1.50 mg/dL (1.7-2.3)  L  01/14/22  09:02    


 


Total Bilirubin  1.10 mg/dL (0.1-1.2)   01/09/22  13:11    


 


AST  23 units/L (5-40)   01/09/22  13:11    


 


ALT  15 units/L (7-56)   01/09/22  13:11    


 


Alkaline Phosphatase  129 units/L ()   01/09/22  13:11    


 


NT-Pro-B Natriuret Pep  1300 pg/mL (0-900)  H  12/30/21  23:51    


 


Total Protein  5.7 g/dL (6.3-8.2)  L  01/09/22  13:11    


 


Albumin  2.5 g/dL (3.9-5)  L  01/09/22  13:11    


 


Albumin/Globulin Ratio  0.8 %  01/09/22  13:11    


 


Renin  0.80 ng/mL/h (0.25-5.82)   01/05/22  02:23    


 


Aldosterone  <1 ng/dL (***)   01/05/22  02:23    


 


Aldosterone/Renin Dir  see below   01/05/22  02:23    


 


TSH  2.930 mlU/mL (0.270-4.200)   01/03/22  00:06    


 


Total Cortisol  40.5 mcg/dL (***)   01/03/22  00:06    


 


Urine Color  Maren  (Yellow)   12/31/21  01:00    


 


Urine Turbidity  Slightly-cloudy  (Clear)   12/31/21  01:00    


 


Urine pH  6.0  (5.0-7.0)   12/31/21  01:00    


 


Ur Specific Gravity  1.012  (1.003-1.030)   12/31/21  01:00    


 


Urine Protein  30 mg/dl mg/dL (Negative)   12/31/21  01:00    


 


Urine Glucose (UA)  Neg mg/dL (Negative)   12/31/21  01:00    


 


Urine Ketones  Neg mg/dL (Negative)   12/31/21  01:00    


 


Urine Blood  Neg  (Negative)   12/31/21  01:00    


 


Urine Nitrite  Neg  (Negative)   12/31/21  01:00    


 


Urine Bilirubin  Neg  (Negative)   12/31/21  01:00    


 


Urine Urobilinogen  < 2.0 mg/dL (<2.0)   12/31/21  01:00    


 


Ur Leukocyte Esterase  Neg  (Negative)   12/31/21  01:00    


 


Urine WBC (Auto)  9.0 /HPF (0.0-6.0)  H  12/31/21  01:00    


 


Urine RBC (Auto)  3.0 /HPF (0.0-6.0)   12/31/21  01:00    


 


U Epithel Cells (Auto)  < 1.0 /HPF (0-13.0)   12/31/21  01:00    


 


Hyaline Casts  8 /LPF  12/31/21  01:00    


 


Urine Mucus  Few /HPF  12/31/21  01:00    











[place]/IV: 


                                        





Voiding Method                   Condom Catheter











Active Medications





- Current Medications


Current Medications: 














Generic Name Dose Route Start Last Admin





  Trade Name Freq  PRN Reason Stop Dose Admin


 


Acetaminophen  650 mg  12/31/21 03:51  01/02/22 22:07





  Acetaminophen 325 Mg Tab  PO   650 mg





  Q4H PRN   Administration





  Pain MILD(1-3)/Fever >100.5/HA  


 


Albuterol  2.5 mg  01/04/22 12:00 





  Albuterol 2.5 Mg/3 Ml Nebu  IH  





  Q4HRT PRN  





  Shortness Of Breath  


 


Albuterol/Ipratropium  1 ampul  01/04/22 14:00  01/14/22 15:07





  Ipratropium/Albuterol Sulfate 3 Ml Ampul.Neb  IH   1 ampul





  TIDRT SHAY   Administration


 


Apixaban  5 mg  01/12/22 10:00  01/14/22 09:34





  Apixaban 5 Mg Tab  PO   5 mg





  Q12HR SHAY   Administration





  Protocol  


 


Famotidine  10 mg  12/31/21 10:00  01/14/22 09:34





  Famotidine 10 Mg Tab  PO   10 mg





  BID SHAY   Administration


 


Potassium Chloride 20 meq/  1,010 mls @ 100 mls/hr  01/14/22 11:00  01/14/22 

11:31





  Dextrose  IV   100 mls/hr





  AS DIRECT SHAY   Administration


 


Metoprolol Tartrate  12.5 mg  01/03/22 22:00  01/14/22 09:34





  Metoprolol Tartrate 25 Mg Tab  PO   12.5 mg





  BID SHAY   Administration


 


Ondansetron HCl  4 mg  12/31/21 03:51 





  Ondansetron 4 Mg/2 Ml Inj  IV  





  Q8H PRN  





  Nausea And Vomiting  


 


Sodium Chloride  10 ml  12/31/21 10:00  01/14/22 09:35





  Sodium Chloride 0.9% 10 Ml Flush Syringe  IV   10 ml





  BID SHAY   Administration


 


Sodium Chloride  10 ml  12/31/21 03:51 





  Sodium Chloride 0.9% 10 Ml Flush Syringe  IV  





  PRN PRN  





  LINE FLUSH  


 


Spironolactone  25 mg  12/31/21 16:00  01/14/22 09:34





  Spironolactone 25 Mg Tab  PO   25 mg





  QDAY SHAY   Administration














Nutrition/Malnutrition Assess





- Dietary Evaluation


Nutrition/Malnutrition Findings: 


                                        





Nutrition Notes                                            Start:  12/31/21 

19:00


Freq:                                                      Status: Active       




Protocol:                                                                       




 Document     01/10/22 17:58  ZACHARY  (Rec: 01/10/22 18:18  ZACHARY  [id number])


 Nutrition Notes


     Initial or Follow up                        Brief Note


     Current Diet                                Pureed Diet (since B 01/11).


     Height                                      5 ft 6 in


     Weight                                      71.7 kg


     Ideal Body Weight (kg)                      64.54


     BMI                                         25.4


     Weight change and time frame                Discrepancy of 6.3 Kg body


                                                 weight loss in 3 days reported


                                                 .


     Weight Status                               Appropriate


     Subjective/Other Information                RD consult for routine F/U on


                                                 dietary assessment.


                                                 No report available on PO


                                                 intake of meals at the time.


                                                 MD ordered NPO until SLP


                                                 evaluation; now Pt is on PO,


                                                 nut no report of SLP


                                                 evaluation available at the


                                                 time.


                                                 No report on changes of


                                                 decubitus pressure ulcer.


     Percent of energy/protein needs met:        Prescribed Pureed Diet


                                                 provides for energy/protein


                                                 needs (1,804 Kcal/77 g) during


                                                 LOS.


     #1


      Nutrition Diagnosis                        Increased nutrient needs   (


                                                 specify in comment below)


      Diagnosis Progress(for reassessment        Continues


       documentation)                            


     Is patient on ventilator?                   No


     Is Patient Ambulatory and/or Out of Bed     No


     REE-([facility]-confined to bed)      [phone]


     Kcal/Kg value to use for calculation        20


     Approximate Energy Requirements Using       1434


      kcal/Kg                                    


     Calculation Used for Recommendations        Kcal/kg


     Additional Notes                            Protein: 1-1.2 g/Kg; 72-86 g/


                                                 day.


                                                 Fluids: 1 ml/Kcal, or as per


                                                 MD.


 Nutrition Intervention


     Change Diet Order:                          Continue Pureed Diet.


     Add Supplement/Snack (indicate name/kcal    Continue 8 fl oz Nepro w/


      /protein )                                 Carbsteady; TID.


                                                 28.8 g pkt Arturo; BID.


     Provides kCal:                              1,465


     Provides Protein (gm)                       62


     Goal #1                                     Support, through dietary


                                                 supplementation, wound healing


                                                 processes during LOS.


     Follow-Up By:                               01/17/22


     Additional Comments                         Continue monitoring food


                                                 tolerance, %PO intake of meals


                                                 , and BM.
Assessment and Plan


Assessment and plan: 





76-year-old  male with history of hypertension CVA and right hip fracture who is

presenting with leg swelling.





Anasarca





Left DVT





NSVT





Acute hypoxic respiratory failure





Hypokalemia





CVA





Hypertension





Hyponatremia





Nephrotic syndrome





Hospital Course


12/31: Follow up echo. Will hold off on lasix at this time due to low K. Replete

K with KCL IV (ordered 8 bags). May need albumin/lasix per nephrology. Follow up

diagnostic studies and cardiology recommendations.





01/01/22: Mild diastolic dysfunction, LVEF 55-65% per Echo read. Continue 

diuresis with spironalactone. Strict I/O's. May consider albumin/lasix if 

diuresis not adequate. Will continue to follow nephrology and cardiology 

recommendations. Anticipate dc in next 24-48 hrs. 





01/02/22: administered albumin + lasix 40 IV x 1.





01/03/22: albumin lasix ordered. US of lower extremity demonstrates DVT in 

popliteal vein. Initiated on heparin gtt.





1/4/2022: Transition from heparin drip to oral anticoagulation if okay with 

nephrology.  Hypokalemia improved.  Nephrology following.  At this time 

urinalysis does not show any evidence of blood only 30 mg of protein.  Albumin 

however is 2.5 which is very concerning rule out other causes for malnutrition/?

 Liver disease, bilirubin noted to be elevated





1/5/2022: We will transition from heparin drip to Eliquis.  Nephrology to check 

magnesium and BMP.  Nurse reports patient has very poor p.o. intake.  Will 

consult dietitian for further evaluation.





1/5/2022:  Continue eliquis.  Nutritionist added supplements with Nepro and 

Arturo twice daily.  Acute kidney injury hyponatremia has resolved.  Chest x-ray 

shows no acute findings.  Anticipate discharge in a.m.





1/6/2022.  Patient's oxygen requirement has been increased to 15 L on Venturi 

mask with FiO2 of 50%.  We will check CTA of the chest to rule out PE especially

given left DVT and respiratory compromise.  Patient was on 2 L satting at 97% 

yesterday.  Pulmonary consultation





1/7/2022.  CT of the chest revealed right upper lobe and middle lobe pneumonia 

that may represent aspiration.  We will consult speech therapy for further 

evaluation.  Continue O2 to maintain sats greater than 92%.  We will start Zosyn

IV and consult ID for further evaluation





History


Interval history: 





No new issues overnight.





Hospitalist Physical





- Constitutional


Vitals: 


                                        











Temp Pulse Resp BP Pulse Ox


 


 98.0 F   102 H  16   123/56   95 


 


 01/08/22 07:58  01/08/22 08:00  01/08/22 08:00  01/08/22 07:58  01/08/22 08:50











General appearance: Present: no acute distress, well-nourished





- EENT


Eyes: Present: PERRL, EOM intact


ENT: hearing intact, clear oral mucosa, dentition normal





- Neck


Neck: Present: supple, normal ROM





- Respiratory


Respiratory effort: normal


Respiratory: bilateral: CTA





- Cardiovascular


Rhythm: regular


Heart Sounds: Present: S1 & S2.  Absent: gallop, rub





- Extremities


Extremities: no ischemia, No edema, Full ROM





- Abdominal


General gastrointestinal: soft, non-tender, non-distended, normal bowel sounds





- Integumentary


Integumentary: Present: clear, warm, dry





- Neurologic


Neurologic: CNII-XII intact, moves all extremities





Results





- Labs


CBC & Chem 7: 


                                 01/07/22 10:07





                                 01/08/22 08:28


Labs: 


                             Laboratory Last Values











WBC  16.7 K/mm3 (4.5-11.0)  H  01/07/22  10:07    


 


RBC  3.94 M/mm3 (3.65-5.03)   01/07/22  10:07    


 


Hgb  10.7 gm/dl (11.8-15.2)  L  01/07/22  10:07    


 


Hct  34.7 % (35.5-45.6)  L  01/07/22  10:07    


 


MCV  88 fl (84-94)   01/07/22  10:07    


 


MCH  27 pg (28-32)  L  01/07/22  10:07    


 


MCHC  31 % (32-34)  L  01/07/22  10:07    


 


RDW  20.8 % (13.2-15.2)  H  01/07/22  10:07    


 


Plt Count  214 K/mm3 (140-440)   01/07/22  10:07    


 


Lymph % (Auto)  Np   01/01/22  11:15    


 


Mono % (Auto)  Np   01/01/22  11:15    


 


Eos % (Auto)  Np   01/01/22  11:15    


 


Baso % (Auto)  Np   01/01/22  11:15    


 


Lymph # (Auto)  Np   01/01/22  11:15    


 


Mono # (Auto)  Np   01/01/22  11:15    


 


Eos # (Auto)  Np   01/01/22  11:15    


 


Baso # (Auto)  Np   01/01/22  11:15    


 


Add Manual Diff  Complete   01/01/22  11:15    


 


Total Counted  100   01/01/22  11:15    


 


Seg Neutrophils %  Np   01/01/22  11:15    


 


Seg Neuts % (Manual)  72.0 % (40.0-70.0)  H  01/01/22  11:15    


 


Lymphocytes % (Manual)  24.0 % (13.4-35.0)   01/01/22  11:15    


 


Monocytes % (Manual)  4.0 % (0.0-7.3)   01/01/22  11:15    


 


Nucleated RBC %  Not Reportable   01/01/22  11:15    


 


Seg Neutrophils #  Np   01/01/22  11:15    


 


Seg Neutrophils # Man  4.2 K/mm3 (1.8-7.7)   01/01/22  11:15    


 


Band Neutrophils #  0.0 K/mm3  01/01/22  11:15    


 


Lymphocytes # (Manual)  1.4 K/mm3 (1.2-5.4)   01/01/22  11:15    


 


Abs React Lymphs (Man)  0.0 K/mm3  01/01/22  11:15    


 


Monocytes # (Manual)  0.2 K/mm3 (0.0-0.8)   01/01/22  11:15    


 


Eosinophils # (Manual)  0.0 K/mm3 (0.0-0.4)   01/01/22  11:15    


 


Basophils # (Manual)  0.0 K/mm3 (0.0-0.1)   01/01/22  11:15    


 


Metamyelocytes #  0.0 K/mm3  01/01/22  11:15    


 


Myelocytes #  0.0 K/mm3  01/01/22  11:15    


 


Promyelocytes #  0.0 K/mm3  01/01/22  11:15    


 


Blast Cells #  0.0 K/mm3  01/01/22  11:15    


 


WBC Morphology  Not Reportable   01/01/22  11:15    


 


Hypersegmented Neuts  Not Reportable   01/01/22  11:15    


 


Hyposegmented Neuts  Not Reportable   01/01/22  11:15    


 


Hypogranular Neuts  Not Reportable   01/01/22  11:15    


 


Smudge Cells  Not Reportable   01/01/22  11:15    


 


Toxic Granulation  Not Reportable   01/01/22  11:15    


 


Toxic Vacuolation  Not Reportable   01/01/22  11:15    


 


Dohle Bodies  Not Reportable   01/01/22  11:15    


 


Pelger-Huet Anomaly  Not Reportable   01/01/22  11:15    


 


Shamir Rods  Not Reportable   01/01/22  11:15    


 


Platelet Estimate  Consistent w auto   01/01/22  11:15    


 


Clumped Platelets  Not Reportable   01/01/22  11:15    


 


Plt Clumps, EDTA  Not Reportable   01/01/22  11:15    


 


Large Platelets  Not Reportable   01/01/22  11:15    


 


Giant Platelets  Not Reportable   01/01/22  11:15    


 


Platelet Satelliting  Not Reportable   01/01/22  11:15    


 


Plt Morphology Comment  Not Reportable   01/01/22  11:15    


 


RBC Morphology  Not Reportable   01/01/22  11:15    


 


Dimorphic RBCs  Not Reportable   01/01/22  11:15    


 


Polychromasia  Not Reportable   01/01/22  11:15    


 


Hypochromasia  Not Reportable   01/01/22  11:15    


 


Poikilocytosis  Not Reportable   01/01/22  11:15    


 


Anisocytosis  Not Reportable   01/01/22  11:15    


 


Microcytosis  Not Reportable   01/01/22  11:15    


 


Macrocytosis  Not Reportable   01/01/22  11:15    


 


Spherocytes  Not Reportable   01/01/22  11:15    


 


Pappenheimer Bodies  Not Reportable   01/01/22  11:15    


 


Sickle Cells  Not Reportable   01/01/22  11:15    


 


Target Cells  Not Reportable   01/01/22  11:15    


 


Tear Drop Cells  Not Reportable   01/01/22  11:15    


 


Ovalocytes  Not Reportable   01/01/22  11:15    


 


Helmet Cells  Not Reportable   01/01/22  11:15    


 


Cuevas-[place] Bodies  Not Reportable   01/01/22  11:15    


 


Cabot Rings  Not Reportable   01/01/22  11:15    


 


[place] Cells  Not Reportable   01/01/22  11:15    


 


Bite Cells  Not Reportable   01/01/22  11:15    


 


Crenated Cell  Not Reportable   01/01/22  11:15    


 


Elliptocytes  Not Reportable   01/01/22  11:15    


 


Acanthocytes (Spur)  Not Reportable   01/01/22  11:15    


 


Rouleaux  Not Reportable   01/01/22  11:15    


 


Hemoglobin C Crystals  Not Reportable   01/01/22  11:15    


 


Schistocytes  Not Reportable   01/01/22  11:15    


 


Malaria parasites  Not Reportable   01/01/22  11:15    


 


Shaggy Bodies  Not Reportable   01/01/22  11:15    


 


Hem Pathologist Commnt  No   01/01/22  11:15    


 


PT  14.5 Sec. (12.2-14.9)   01/05/22  14:48    


 


INR  1.02  (0.87-1.13)   01/05/22  14:48    


 


APTT  33.1 Sec. (24.2-36.6)   01/05/22  14:48    


 


Heparin Anti-Xa Level  < 0.10 U.I./ml (0.3-0.7)  L  01/05/22  14:48    


 


ABG pH  7.431 pH Units (7.350-7.450)   01/06/22  12:30    


 


ABG pCO2  49.5 mm Hg  01/06/22  12:30    


 


ABG pO2  25.2 mm Hg (80.0-90.0)  L*  01/06/22  12:30    


 


ABG HCO3  32.2 mmol/L (20.0-26.0)  H  01/06/22  12:30    


 


ABG O2 Saturation  41.1 % (95.0-99.0)  L  01/06/22  12:30    


 


ABG O2 Content  9.4  (0.0-44)   01/06/22  12:30    


 


ABG Base Excess  6.6 mmol/L (-2.0-3.0)  H  01/06/22  12:30    


 


ABG Hemoglobin  10.2 gm/dl (14.0-18.0)  L  01/06/22  12:30    


 


ABG Carboxyhemoglobin  1.7 % (0.0-5.0)   01/06/22  12:30    


 


ABG Methemoglobin  0.8 % (0.0-1.5)   01/06/22  12:30    


 


Oxyhemoglobin  40.1 % (95.0-99.0)  L  01/06/22  12:30    


 


FiO2  28 %  01/06/22  12:30    


 


Sodium  136 mmol/L (137-145)  L  01/05/22  14:48    


 


Potassium  4.9 mmol/L (3.6-5.0)  D 01/05/22  14:48    


 


Chloride  97.2 mmol/L ()  L  01/05/22  14:48    


 


Carbon Dioxide  29 mmol/L (22-30)   01/05/22  14:48    


 


Anion Gap  15 mmol/L  01/05/22  14:48    


 


BUN  9 mg/dL (9-20)   01/05/22  14:48    


 


Creatinine  0.5 mg/dL (0.8-1.3)  L  01/08/22  08:28    


 


Estimated GFR  > 60 ml/min  01/08/22  08:28    


 


BUN/Creatinine Ratio  15 %  01/05/22  14:48    


 


Glucose  96 mg/dL ()   01/05/22  14:48    


 


POC Glucose  95 mg/dL ()   01/03/22  07:38    


 


Calcium  8.5 mg/dL (8.4-10.2)   01/05/22  14:48    


 


Magnesium  1.70 mg/dL (1.7-2.3)   01/05/22  14:48    


 


Total Bilirubin  1.40 mg/dL (0.1-1.2)  H  12/30/21  23:51    


 


AST  28 units/L (5-40)   12/30/21  23:51    


 


ALT  17 units/L (7-56)   12/30/21  23:51    


 


Alkaline Phosphatase  141 units/L ()  H  12/30/21  23:51    


 


NT-Pro-B Natriuret Pep  1300 pg/mL (0-900)  H  12/30/21  23:51    


 


Total Protein  5.9 g/dL (6.3-8.2)  L  12/30/21  23:51    


 


Albumin  2.5 g/dL (3.9-5)  L  12/30/21  23:51    


 


Albumin/Globulin Ratio  0.7 %  12/30/21  23:51    


 


TSH  2.930 mlU/mL (0.270-4.200)   01/03/22  00:06    


 


Total Cortisol  40.5 mcg/dL (***)   01/03/22  00:06    


 


Urine Color  Maren  (Yellow)   12/31/21  01:00    


 


Urine Turbidity  Slightly-cloudy  (Clear)   12/31/21  01:00    


 


Urine pH  6.0  (5.0-7.0)   12/31/21  01:00    


 


Ur Specific Gravity  1.012  (1.003-1.030)   12/31/21  01:00    


 


Urine Protein  30 mg/dl mg/dL (Negative)   12/31/21  01:00    


 


Urine Glucose (UA)  Neg mg/dL (Negative)   12/31/21  01:00    


 


Urine Ketones  Neg mg/dL (Negative)   12/31/21  01:00    


 


Urine Blood  Neg  (Negative)   12/31/21  01:00    


 


Urine Nitrite  Neg  (Negative)   12/31/21  01:00    


 


Urine Bilirubin  Neg  (Negative)   12/31/21  01:00    


 


Urine Urobilinogen  < 2.0 mg/dL (<2.0)   12/31/21  01:00    


 


Ur Leukocyte Esterase  Neg  (Negative)   12/31/21  01:00    


 


Urine WBC (Auto)  9.0 /HPF (0.0-6.0)  H  12/31/21  01:00    


 


Urine RBC (Auto)  3.0 /HPF (0.0-6.0)   12/31/21  01:00    


 


U Epithel Cells (Auto)  < 1.0 /HPF (0-13.0)   12/31/21  01:00    


 


Hyaline Casts  8 /LPF  12/31/21  01:00    


 


Urine Mucus  Few /HPF  12/31/21  01:00    











[place]/IV: 


                                        





Voiding Method                   Condom Catheter











Active Medications





- Current Medications


Current Medications: 














Generic Name Dose Route Start Last Admin





  Trade Name Freq  PRN Reason Stop Dose Admin


 


Acetaminophen  650 mg  12/31/21 03:51  01/02/22 22:07





  Acetaminophen 325 Mg Tab  PO   650 mg





  Q4H PRN   Administration





  Pain MILD(1-3)/Fever >100.5/HA  


 


Albuterol  2.5 mg  01/04/22 12:00 





  Albuterol 2.5 Mg/3 Ml Nebu  IH  





  Q4HRT PRN  





  Shortness Of Breath  


 


Albuterol/Ipratropium  1 ampul  01/04/22 14:00  01/08/22 08:39





  Ipratropium/Albuterol Sulfate 3 Ml Ampul.Neb  IH   1 ampul





  TIDRT SHAY   Administration


 


Apixaban  10 mg  01/05/22 10:00  01/08/22 10:39





  Apixaban 5 Mg Tab  PO  01/11/22 22:01  10 mg





  Q12HR SHAY   Administration





  Protocol  


 


Apixaban  5 mg  01/12/22 10:00 





  Apixaban 5 Mg Tab  PO  





  Q12HR SHAY  





  Protocol  


 


Famotidine  10 mg  12/31/21 10:00  01/08/22 10:39





  Famotidine 10 Mg Tab  PO   10 mg





  BID SHAY   Administration


 


Hydromorphone HCl  0.5 mg  12/31/21 03:51 





  Hydromorphone 1 Mg/1 Ml Inj  IV  





  Q3H PRN  





  Pain , Severe (7-10)  


 


Piperacillin Sod/Tazobactam Sod  4.5 gm in 100 mls @ 200 mls/hr  01/08/22 10:00 

01/08/22 10:40





  Zosyn/Ns 4.5gm/100ml  IV   200 mls/hr





  Q8H SHAY   Administration


 


Metoprolol Tartrate  12.5 mg  01/03/22 22:00  01/08/22 10:39





  Metoprolol Tartrate 25 Mg Tab  PO   12.5 mg





  BID SHAY   Administration


 


Morphine Sulfate  2 mg  12/31/21 03:51  01/07/22 14:59





  Morphine 2 Mg/1 Ml Inj  IV   2 mg





  Q4H PRN   Administration





  Pain, Moderate (4-6)  


 


Ondansetron HCl  4 mg  12/31/21 03:51 





  Ondansetron 4 Mg/2 Ml Inj  IV  





  Q8H PRN  





  Nausea And Vomiting  


 


Sodium Chloride  10 ml  12/31/21 10:00  01/08/22 10:40





  Sodium Chloride 0.9% 10 Ml Flush Syringe  IV   10 ml





  BID SHAY   Administration


 


Sodium Chloride  10 ml  12/31/21 03:51 





  Sodium Chloride 0.9% 10 Ml Flush Syringe  IV  





  PRN PRN  





  LINE FLUSH  


 


Spironolactone  25 mg  12/31/21 16:00  01/08/22 10:40





  Spironolactone 25 Mg Tab  PO   25 mg





  QDAY SHAY   Administration














Nutrition/Malnutrition Assess





- Dietary Evaluation


Nutrition/Malnutrition Findings: 


                                        





Nutrition Notes                                            Start:  12/31/21 

19:00


Freq:                                                      Status: Active       




Protocol:                                                                       




 Document     01/07/22 14:49  ZACHARY  (Rec: 01/07/22 15:01  ZACHARY  [id number])


 Nutrition Notes


     Initial or Follow up                        Brief Note


     Current Diet                                Cardiac Diet (since B 12/31),


                                                 D Suppl (since D 01/05).


     Height                                      5 ft 6 in


     Weight                                      78 kg


     Ideal Body Weight (kg)                      64.54


     BMI                                         27.7


     Weight change and time frame                No body weight change reported


                                                 .


     Weight Status                               Overweight


     Subjective/Other Information                RD consult for routine F/U on


                                                 dietary assessment.


                                                 No report available on PO


                                                 intake of meals at the time, I


                                                 spoke with RN over the phone


                                                 and told me that Pt is


                                                 lethargic and not eating or


                                                 drinking, while requiring more


                                                 oxigen.


                                                 I recommended advance to TF,


                                                 she will follow with MD.


                                                 Pt is not a candidate for


                                                 nutrition education at the


                                                 time.


     Percent of energy/protein needs met:        Prescribed Cardiac Diet


                                                 provides for energy/protein


                                                 needs (2,230 Kcal/85 g) during


                                                 LOS; additionally, Dietary


                                                 Supplements will support wound


                                                 healing processes, and


                                                 compensate for possible Poor


                                                 PO intake of meals with 1,465


                                                 Kcal and 62 g of protein.


     #1


      Nutrition Diagnosis                        Increased nutrient needs   (


                                                 specify in comment below)


      Diagnosis Progress(for reassessment        Continues


       documentation)                            


 Nutrition Intervention


     Follow-Up By:                               01/10/22


     Additional Comments                         Continue monitoring food


                                                 tolerance, %PO intake of meals


                                                 , and BM.


                                                 Possible advance to TF.
Assessment and Plan


Assessment and plan: 





76-year-old  male with history of hypertension CVA and right hip fracture who is

presenting with leg swelling.





Anasarca





Left DVT





NSVT





Acute hypoxic respiratory failure





Hypokalemia





CVA





Hypertension





Hyponatremia





Nephrotic syndrome





Hospital Course


12/31: Follow up echo. Will hold off on lasix at this time due to low K. Replete

K with KCL IV (ordered 8 bags). May need albumin/lasix per nephrology. Follow up

diagnostic studies and cardiology recommendations.





01/01/22: Mild diastolic dysfunction, LVEF 55-65% per Echo read. Continue 

diuresis with spironalactone. Strict I/O's. May consider albumin/lasix if 

diuresis not adequate. Will continue to follow nephrology and cardiology 

recommendations. Anticipate dc in next 24-48 hrs. 





01/02/22: administered albumin + lasix 40 IV x 1.





01/03/22: albumin lasix ordered. US of lower extremity demonstrates DVT in 

popliteal vein. Initiated on heparin gtt.





1/4/2022: Transition from heparin drip to oral anticoagulation if okay with 

nephrology.  Hypokalemia improved.  Nephrology following.  At this time 

urinalysis does not show any evidence of blood only 30 mg of protein.  Albumin 

however is 2.5 which is very concerning rule out other causes for malnutrition/?

 Liver disease, bilirubin noted to be elevated





1/5/2022: We will transition from heparin drip to Eliquis.  Nephrology to check 

magnesium and BMP.  Nurse reports patient has very poor p.o. intake.  Will 

consult dietitian for further evaluation.





1/5/2022:  Continue eliquis.  Nutritionist added supplements with Nepro and 

Arturo twice daily.  Acute kidney injury hyponatremia has resolved.  Chest x-ray 

shows no acute findings.  Anticipate discharge in a.m.





1/6/2022.  Patient's oxygen requirement has been increased to 15 L on Venturi 

mask with FiO2 of 50%.  We will check CTA of the chest to rule out PE especially

given left DVT and respiratory compromise.  Patient was on 2 L satting at 97% 

yesterday.  Pulmonary consultation





History


Interval history: 





No new issues overnight.





Hospitalist Physical





- Constitutional


Vitals: 


                                        











Temp Pulse Resp BP Pulse Ox


 


 98.0 F   101 H  18   128/56   96 


 


 01/07/22 09:00  01/07/22 09:00  01/07/22 09:00  01/07/22 09:00  01/07/22 09:00











General appearance: Present: no acute distress, well-nourished





- EENT


Eyes: Present: PERRL, EOM intact


ENT: hearing intact, clear oral mucosa, dentition normal





- Neck


Neck: Present: supple, normal ROM





- Respiratory


Respiratory effort: normal


Respiratory: bilateral: CTA





- Cardiovascular


Rhythm: regular


Heart Sounds: Present: S1 & S2.  Absent: gallop, rub





- Extremities


Extremities: no ischemia, No edema, Full ROM





- Abdominal


General gastrointestinal: soft, non-tender, non-distended, normal bowel sounds





- Integumentary


Integumentary: Present: clear, warm, dry





- Neurologic


Neurologic: CNII-XII intact, moves all extremities





Results





- Labs


CBC & Chem 7: 


                                 01/06/22 13:22





                                 01/05/22 14:48


Labs: 


                             Laboratory Last Values











WBC  19.9 K/mm3 (4.5-11.0)  H  01/06/22  13:22    


 


RBC  3.90 M/mm3 (3.65-5.03)   01/06/22  13:22    


 


Hgb  10.8 gm/dl (11.8-15.2)  L  01/06/22  13:22    


 


Hct  34.6 % (35.5-45.6)  L  01/06/22  13:22    


 


MCV  89 fl (84-94)   01/06/22  13:22    


 


MCH  28 pg (28-32)   01/06/22  13:22    


 


MCHC  31 % (32-34)  L  01/06/22  13:22    


 


RDW  21.1 % (13.2-15.2)  H  01/06/22  13:22    


 


Plt Count  239 K/mm3 (140-440)   01/06/22  13:22    


 


Lymph % (Auto)  Np   01/01/22  11:15    


 


Mono % (Auto)  Np   01/01/22  11:15    


 


Eos % (Auto)  Np   01/01/22  11:15    


 


Baso % (Auto)  Np   01/01/22  11:15    


 


Lymph # (Auto)  Np   01/01/22  11:15    


 


Mono # (Auto)  Np   01/01/22  11:15    


 


Eos # (Auto)  Np   01/01/22  11:15    


 


Baso # (Auto)  Np   01/01/22  11:15    


 


Add Manual Diff  Complete   01/01/22  11:15    


 


Total Counted  100   01/01/22  11:15    


 


Seg Neutrophils %  Np   01/01/22  11:15    


 


Seg Neuts % (Manual)  72.0 % (40.0-70.0)  H  01/01/22  11:15    


 


Lymphocytes % (Manual)  24.0 % (13.4-35.0)   01/01/22  11:15    


 


Monocytes % (Manual)  4.0 % (0.0-7.3)   01/01/22  11:15    


 


Nucleated RBC %  Not Reportable   01/01/22  11:15    


 


Seg Neutrophils #  Np   01/01/22  11:15    


 


Seg Neutrophils # Man  4.2 K/mm3 (1.8-7.7)   01/01/22  11:15    


 


Band Neutrophils #  0.0 K/mm3  01/01/22  11:15    


 


Lymphocytes # (Manual)  1.4 K/mm3 (1.2-5.4)   01/01/22  11:15    


 


Abs React Lymphs (Man)  0.0 K/mm3  01/01/22  11:15    


 


Monocytes # (Manual)  0.2 K/mm3 (0.0-0.8)   01/01/22  11:15    


 


Eosinophils # (Manual)  0.0 K/mm3 (0.0-0.4)   01/01/22  11:15    


 


Basophils # (Manual)  0.0 K/mm3 (0.0-0.1)   01/01/22  11:15    


 


Metamyelocytes #  0.0 K/mm3  01/01/22  11:15    


 


Myelocytes #  0.0 K/mm3  01/01/22  11:15    


 


Promyelocytes #  0.0 K/mm3  01/01/22  11:15    


 


Blast Cells #  0.0 K/mm3  01/01/22  11:15    


 


WBC Morphology  Not Reportable   01/01/22  11:15    


 


Hypersegmented Neuts  Not Reportable   01/01/22  11:15    


 


Hyposegmented Neuts  Not Reportable   01/01/22  11:15    


 


Hypogranular Neuts  Not Reportable   01/01/22  11:15    


 


Smudge Cells  Not Reportable   01/01/22  11:15    


 


Toxic Granulation  Not Reportable   01/01/22  11:15    


 


Toxic Vacuolation  Not Reportable   01/01/22  11:15    


 


Dohle Bodies  Not Reportable   01/01/22  11:15    


 


Pelger-Huet Anomaly  Not Reportable   01/01/22  11:15    


 


Shamir Rods  Not Reportable   01/01/22  11:15    


 


Platelet Estimate  Consistent w auto   01/01/22  11:15    


 


Clumped Platelets  Not Reportable   01/01/22  11:15    


 


Plt Clumps, EDTA  Not Reportable   01/01/22  11:15    


 


Large Platelets  Not Reportable   01/01/22  11:15    


 


Giant Platelets  Not Reportable   01/01/22  11:15    


 


Platelet Satelliting  Not Reportable   01/01/22  11:15    


 


Plt Morphology Comment  Not Reportable   01/01/22  11:15    


 


RBC Morphology  Not Reportable   01/01/22  11:15    


 


Dimorphic RBCs  Not Reportable   01/01/22  11:15    


 


Polychromasia  Not Reportable   01/01/22  11:15    


 


Hypochromasia  Not Reportable   01/01/22  11:15    


 


Poikilocytosis  Not Reportable   01/01/22  11:15    


 


Anisocytosis  Not Reportable   01/01/22  11:15    


 


Microcytosis  Not Reportable   01/01/22  11:15    


 


Macrocytosis  Not Reportable   01/01/22  11:15    


 


Spherocytes  Not Reportable   01/01/22  11:15    


 


Pappenheimer Bodies  Not Reportable   01/01/22  11:15    


 


Sickle Cells  Not Reportable   01/01/22  11:15    


 


Target Cells  Not Reportable   01/01/22  11:15    


 


Tear Drop Cells  Not Reportable   01/01/22  11:15    


 


Ovalocytes  Not Reportable   01/01/22  11:15    


 


Helmet Cells  Not Reportable   01/01/22  11:15    


 


Cuevas-Germania Bodies  Not Reportable   01/01/22  11:15    


 


Cabot Rings  Not Reportable   01/01/22  11:15    


 


Crump Cells  Not Reportable   01/01/22  11:15    


 


Bite Cells  Not Reportable   01/01/22  11:15    


 


Crenated Cell  Not Reportable   01/01/22  11:15    


 


Elliptocytes  Not Reportable   01/01/22  11:15    


 


Acanthocytes (Spur)  Not Reportable   01/01/22  11:15    


 


Rouleaux  Not Reportable   01/01/22  11:15    


 


Hemoglobin C Crystals  Not Reportable   01/01/22  11:15    


 


Schistocytes  Not Reportable   01/01/22  11:15    


 


Malaria parasites  Not Reportable   01/01/22  11:15    


 


Shaggy Bodies  Not Reportable   01/01/22  11:15    


 


Hem Pathologist Commnt  No   01/01/22  11:15    


 


PT  14.5 Sec. (12.2-14.9)   01/05/22  14:48    


 


INR  1.02  (0.87-1.13)   01/05/22  14:48    


 


APTT  33.1 Sec. (24.2-36.6)   01/05/22  14:48    


 


Heparin Anti-Xa Level  < 0.10 U.I./ml (0.3-0.7)  L  01/05/22  14:48    


 


ABG pH  7.431 pH Units (7.350-7.450)   01/06/22  12:30    


 


ABG pCO2  49.5 mm Hg  01/06/22  12:30    


 


ABG pO2  25.2 mm Hg (80.0-90.0)  L*  01/06/22  12:30    


 


ABG HCO3  32.2 mmol/L (20.0-26.0)  H  01/06/22  12:30    


 


ABG O2 Saturation  41.1 % (95.0-99.0)  L  01/06/22  12:30    


 


ABG O2 Content  9.4  (0.0-44)   01/06/22  12:30    


 


ABG Base Excess  6.6 mmol/L (-2.0-3.0)  H  01/06/22  12:30    


 


ABG Hemoglobin  10.2 gm/dl (14.0-18.0)  L  01/06/22  12:30    


 


ABG Carboxyhemoglobin  1.7 % (0.0-5.0)   01/06/22  12:30    


 


ABG Methemoglobin  0.8 % (0.0-1.5)   01/06/22  12:30    


 


Oxyhemoglobin  40.1 % (95.0-99.0)  L  01/06/22  12:30    


 


FiO2  28 %  01/06/22  12:30    


 


Sodium  136 mmol/L (137-145)  L  01/05/22  14:48    


 


Potassium  4.9 mmol/L (3.6-5.0)  D 01/05/22  14:48    


 


Chloride  97.2 mmol/L ()  L  01/05/22  14:48    


 


Carbon Dioxide  29 mmol/L (22-30)   01/05/22  14:48    


 


Anion Gap  15 mmol/L  01/05/22  14:48    


 


BUN  9 mg/dL (9-20)   01/05/22  14:48    


 


Creatinine  0.6 mg/dL (0.8-1.3)  L  01/05/22  14:48    


 


Creatinine  0.6 mg/dL (0.8-1.3)  L  01/05/22  14:48    


 


Estimated GFR  > 60 ml/min  01/05/22  14:48    


 


Estimated GFR  > 60 ml/min  01/05/22  14:48    


 


BUN/Creatinine Ratio  15 %  01/05/22  14:48    


 


Glucose  96 mg/dL ()   01/05/22  14:48    


 


POC Glucose  95 mg/dL ()   01/03/22  07:38    


 


Calcium  8.5 mg/dL (8.4-10.2)   01/05/22  14:48    


 


Magnesium  1.70 mg/dL (1.7-2.3)   01/05/22  14:48    


 


Total Bilirubin  1.40 mg/dL (0.1-1.2)  H  12/30/21  23:51    


 


AST  28 units/L (5-40)   12/30/21  23:51    


 


ALT  17 units/L (7-56)   12/30/21  23:51    


 


Alkaline Phosphatase  141 units/L ()  H  12/30/21  23:51    


 


NT-Pro-B Natriuret Pep  1300 pg/mL (0-900)  H  12/30/21  23:51    


 


Total Protein  5.9 g/dL (6.3-8.2)  L  12/30/21  23:51    


 


Albumin  2.5 g/dL (3.9-5)  L  12/30/21  23:51    


 


Albumin/Globulin Ratio  0.7 %  12/30/21  23:51    


 


TSH  2.930 mlU/mL (0.270-4.200)   01/03/22  00:06    


 


Total Cortisol  40.5 mcg/dL (***)   01/03/22  00:06    


 


Urine Color  Maren  (Yellow)   12/31/21  01:00    


 


Urine Turbidity  Slightly-cloudy  (Clear)   12/31/21  01:00    


 


Urine pH  6.0  (5.0-7.0)   12/31/21  01:00    


 


Ur Specific Gravity  1.012  (1.003-1.030)   12/31/21  01:00    


 


Urine Protein  30 mg/dl mg/dL (Negative)   12/31/21  01:00    


 


Urine Glucose (UA)  Neg mg/dL (Negative)   12/31/21  01:00    


 


Urine Ketones  Neg mg/dL (Negative)   12/31/21  01:00    


 


Urine Blood  Neg  (Negative)   12/31/21  01:00    


 


Urine Nitrite  Neg  (Negative)   12/31/21  01:00    


 


Urine Bilirubin  Neg  (Negative)   12/31/21  01:00    


 


Urine Urobilinogen  < 2.0 mg/dL (<2.0)   12/31/21  01:00    


 


Ur Leukocyte Esterase  Neg  (Negative)   12/31/21  01:00    


 


Urine WBC (Auto)  9.0 /HPF (0.0-6.0)  H  12/31/21  01:00    


 


Urine RBC (Auto)  3.0 /HPF (0.0-6.0)   12/31/21  01:00    


 


U Epithel Cells (Auto)  < 1.0 /HPF (0-13.0)   12/31/21  01:00    


 


Hyaline Casts  8 /LPF  12/31/21  01:00    


 


Urine Mucus  Few /HPF  12/31/21  01:00    











[place]osta/IV: 


                                        





Voiding Method                   Condom Catheter











Active Medications





- Current Medications


Current Medications: 














Generic Name Dose Route Start Last Admin





  Trade Name Freq  PRN Reason Stop Dose Admin


 


Acetaminophen  650 mg  12/31/21 03:51  01/02/22 22:07





  Acetaminophen 325 Mg Tab  PO   650 mg





  Q4H PRN   Administration





  Pain MILD(1-3)/Fever >100.5/HA  


 


Albuterol  2.5 mg  01/04/22 12:00 





  Albuterol 2.5 Mg/3 Ml Nebu  IH  





  Q4HRT PRN  





  Shortness Of Breath  


 


Albuterol/Ipratropium  1 ampul  01/04/22 14:00  01/07/22 09:19





  Ipratropium/Albuterol Sulfate 3 Ml Ampul.Neb  IH   Not Given





  TIDRT SHAY  


 


Apixaban  10 mg  01/05/22 10:00  01/07/22 09:20





  Apixaban 5 Mg Tab  PO  01/11/22 22:01  10 mg





  Q12HR SHAY   Administration





  Protocol  


 


Apixaban  5 mg  01/12/22 10:00 





  Apixaban 5 Mg Tab  PO  





  Q12HR SHAY  





  Protocol  


 


Famotidine  10 mg  12/31/21 10:00  01/07/22 09:20





  Famotidine 10 Mg Tab  PO   10 mg





  BID SHAY   Administration


 


Hydromorphone HCl  0.5 mg  12/31/21 03:51 





  Hydromorphone 1 Mg/1 Ml Inj  IV  





  Q3H PRN  





  Pain , Severe (7-10)  


 


Metoprolol Tartrate  12.5 mg  01/03/22 22:00  01/07/22 09:20





  Metoprolol Tartrate 25 Mg Tab  PO   12.5 mg





  BID SHAY   Administration


 


Morphine Sulfate  2 mg  12/31/21 03:51  01/06/22 09:43





  Morphine 2 Mg/1 Ml Inj  IV   2 mg





  Q4H PRN   Administration





  Pain, Moderate (4-6)  


 


Ondansetron HCl  4 mg  12/31/21 03:51 





  Ondansetron 4 Mg/2 Ml Inj  IV  





  Q8H PRN  





  Nausea And Vomiting  


 


Sodium Chloride  10 ml  12/31/21 10:00  01/07/22 09:20





  Sodium Chloride 0.9% 10 Ml Flush Syringe  IV   10 ml





  BID SHAY   Administration


 


Sodium Chloride  10 ml  12/31/21 03:51 





  Sodium Chloride 0.9% 10 Ml Flush Syringe  IV  





  PRN PRN  





  LINE FLUSH  


 


Spironolactone  25 mg  12/31/21 16:00  01/07/22 09:20





  Spironolactone 25 Mg Tab  PO   25 mg





  QDAY SHAY   Administration














Nutrition/Malnutrition Assess





- Dietary Evaluation


Nutrition/Malnutrition Findings: 


                                        





Nutrition Notes                                            Start:  12/31/21 

19:00


Freq:                                                      Status: Active       




Protocol:                                                                       




 Document     01/05/22 14:24  ZACHARY  (Rec: 01/05/22 14:49  ZACHARY  [id number])


 Nutrition Notes


     Need for Assessment generated from:         RN Referral


     Initial or Follow up                        Assessment


     Current Diagnosis                           Decubitus(Pressure Ulcer),


                                                 Hypertension


     Other Pertinent Diagnosis                   Nephrotic syndrome, Anasarca,


                                                 CVA, Hypokalemia.


     Current Diet                                Cardiac Diet (since B 12/31),


                                                 D Suppl (since D 01/05).


     Labs/Tests                                  01/04: K 3.4, Crea 0.7, Ca 7.9


                                                 .


     Pertinent Medications                       01/05: KCl 40 mEq, others


                                                 nutritionally unremarkable.


     Height                                      5 ft 6 in


     Weight                                      78 kg


     Ideal Body Weight (kg)                      64.54


     BMI                                         27.7


     Weight change and time frame                3.647 Kg body weight loss in 5


                                                 days reported.


     Weight Status                               Overweight


     Subjective/Other Information                RD consult for Poor Oral


                                                 Intake assessment.


                                                 No report available on %PO


                                                 intake of meals at the time.


                                                 Malnutrition assessment weill


                                                 be provided at F/U, during


                                                 visit for Nutrition Education.


     Percent of energy/protein needs met:        Prescribed Cardiac Diet


                                                 provides for energy/protein


                                                 needs (2,230 Kcal/85 g) during


                                                 LOS; additionally, Dietary


                                                 Supplements will support wound


                                                 healing processes, and


                                                 compensate for possible Poor


                                                 PO intake of meals with 1,465


                                                 Kcal and 62 g of protein.


     Burn                                        Absent


     Trauma                                      Absent


     GI Symptoms                                 None


     Food Allergy                                No


     Skin Integrity/Comment                      Decubitus sacral pressure


                                                 wounds.


     #1


      Nutrition Diagnosis                        Increased nutrient needs   (


                                                 specify in comment below)


      Comments:                                  Protein to support wound


                                                 healing.


      Etiology                                   Ongoing and concomitant


                                                 chronic metabolic conditions.


      As Evidenced by Signs and Symptoms         Documents at admission.


     Is patient on ventilator?                   No


     Is Patient Ambulatory and/or Out of Bed     No


     [facility]      [phone]


     Kcal/Kg value to use for calculation        19


     Approximate Energy Requirements Using       1482


      kcal/Kg                                    


     Calculation Used for Recommendations        Kcal/kg


     Additional Notes                            Protein: 1-1.2 g/Kg; 78-94 g/


                                                 day.


                                                 Fluids: 1 ml/Kcal, or as per


                                                 MD.


 Nutrition Intervention


     Change Diet Order:                          Continue Cardiac Diet.


     Add Supplement/Snack (indicate name/kcal    8 fl oz Nepro w/Carbsteady;


      /protein )                                 TID.


                                                 28.8 g pkt Arturo; BID.


     Provides kCal:                              1,465


     Provides Protein (gm)                       62


     Goal #1                                     Support, through dietary


                                                 supplementation, wound healing


                                                 processes during LOS.


     Follow-Up By:                               01/07/22


     Additional Comments                         F/U Nutrition Education and


                                                 malnutrition assessment.


                                                 Continue monitoring food


                                                 tolerance, %PO intake of meals


                                                 , and BM.
Assessment and Plan


Assessment and plan: 





76-year-old  male with history of hypertension CVA and right hip fracture who is

presenting with leg swelling.





Anasarca





Left DVT





NSVT





Acute hypoxic respiratory failure





Hypokalemia





CVA





Hypertension





Hyponatremia





Nephrotic syndrome





Hospital Course


12/31: Follow up echo. Will hold off on lasix at this time due to low K. Replete

K with KCL IV (ordered 8 bags). May need albumin/lasix per nephrology. Follow up

diagnostic studies and cardiology recommendations.





01/01/22: Mild diastolic dysfunction, LVEF 55-65% per Echo read. Continue 

diuresis with spironalactone. Strict I/O's. May consider albumin/lasix if 

diuresis not adequate. Will continue to follow nephrology and cardiology 

recommendations. Anticipate dc in next 24-48 hrs. 





01/02/22: administered albumin + lasix 40 IV x 1.





01/03/22: albumin lasix ordered. US of lower extremity demonstrates DVT in 

popliteal vein. Initiated on heparin gtt.





1/4/2022: Transition from heparin drip to oral anticoagulation if okay with 

nephrology.  Hypokalemia improved.  Nephrology following.  At this time 

urinalysis does not show any evidence of blood only 30 mg of protein.  Albumin 

however is 2.5 which is very concerning rule out other causes for malnutrition/?

 Liver disease, bilirubin noted to be elevated





1/5/2022: We will transition from heparin drip to Eliquis.  Nephrology to check 

magnesium and BMP.  Nurse reports patient has very poor p.o. intake.  Will 

consult dietitian for further evaluation.





1/5/2022:  Continue eliquis.  Nutritionist added supplements with Nepro and 

Arturo twice daily.  Acute kidney injury hyponatremia has resolved.  Chest x-ray 

shows no acute findings.  Anticipate discharge in a.m.





History


Interval history: 





No new issues overnight.





Hospitalist Physical





- Constitutional


Vitals: 


                                        











Temp Pulse Resp BP Pulse Ox


 


 98.0 F   121 H  24   107/58   95 


 


 01/06/22 08:31  01/06/22 08:31  01/06/22 08:34  01/06/22 08:31  01/06/22 08:34











General appearance: Present: no acute distress, well-nourished





- EENT


Eyes: Present: PERRL, EOM intact


ENT: hearing intact, clear oral mucosa, dentition normal





- Neck


Neck: Present: supple, normal ROM





- Respiratory


Respiratory effort: normal


Respiratory: bilateral: CTA





- Cardiovascular


Rhythm: regular


Heart Sounds: Present: S1 & S2.  Absent: gallop, rub





- Extremities


Extremities: no ischemia, No edema, Full ROM





- Abdominal


General gastrointestinal: soft, non-tender, non-distended, normal bowel sounds





- Integumentary


Integumentary: Present: clear, warm, dry





- Neurologic


Neurologic: CNII-XII intact, moves all extremities





Results





- Labs


CBC & Chem 7: 


                                01/05/22 Unknown





                                 01/05/22 14:48


Labs: 


                             Laboratory Last Values











WBC  7.3 K/mm3 (4.5-11.0)   01/05/22  Unknown


 


RBC  3.99 M/mm3 (3.65-5.03)   01/05/22  Unknown


 


Hgb  11.0 gm/dl (11.8-15.2)  L  01/05/22  Unknown


 


Hct  34.7 % (35.5-45.6)  L  01/05/22  Unknown


 


MCV  87 fl (84-94)   01/05/22  Unknown


 


MCH  28 pg (28-32)   01/05/22  Unknown


 


MCHC  32 % (32-34)   01/05/22  Unknown


 


RDW  21.0 % (13.2-15.2)  H  01/05/22  Unknown


 


Plt Count  262 K/mm3 (140-440)   01/05/22  Unknown


 


Lymph % (Auto)  Np   01/01/22  11:15    


 


Mono % (Auto)  Np   01/01/22  11:15    


 


Eos % (Auto)  Np   01/01/22  11:15    


 


Baso % (Auto)  Np   01/01/22  11:15    


 


Lymph # (Auto)  Np   01/01/22  11:15    


 


Mono # (Auto)  Np   01/01/22  11:15    


 


Eos # (Auto)  Np   01/01/22  11:15    


 


Baso # (Auto)  Np   01/01/22  11:15    


 


Add Manual Diff  Complete   01/01/22  11:15    


 


Total Counted  100   01/01/22  11:15    


 


Seg Neutrophils %  Np   01/01/22  11:15    


 


Seg Neuts % (Manual)  72.0 % (40.0-70.0)  H  01/01/22  11:15    


 


Lymphocytes % (Manual)  24.0 % (13.4-35.0)   01/01/22  11:15    


 


Monocytes % (Manual)  4.0 % (0.0-7.3)   01/01/22  11:15    


 


Nucleated RBC %  Not Reportable   01/01/22  11:15    


 


Seg Neutrophils #  Np   01/01/22  11:15    


 


Seg Neutrophils # Man  4.2 K/mm3 (1.8-7.7)   01/01/22  11:15    


 


Band Neutrophils #  0.0 K/mm3  01/01/22  11:15    


 


Lymphocytes # (Manual)  1.4 K/mm3 (1.2-5.4)   01/01/22  11:15    


 


Abs React Lymphs (Man)  0.0 K/mm3  01/01/22  11:15    


 


Monocytes # (Manual)  0.2 K/mm3 (0.0-0.8)   01/01/22  11:15    


 


Eosinophils # (Manual)  0.0 K/mm3 (0.0-0.4)   01/01/22  11:15    


 


Basophils # (Manual)  0.0 K/mm3 (0.0-0.1)   01/01/22  11:15    


 


Metamyelocytes #  0.0 K/mm3  01/01/22  11:15    


 


Myelocytes #  0.0 K/mm3  01/01/22  11:15    


 


Promyelocytes #  0.0 K/mm3  01/01/22  11:15    


 


Blast Cells #  0.0 K/mm3  01/01/22  11:15    


 


WBC Morphology  Not Reportable   01/01/22  11:15    


 


Hypersegmented Neuts  Not Reportable   01/01/22  11:15    


 


Hyposegmented Neuts  Not Reportable   01/01/22  11:15    


 


Hypogranular Neuts  Not Reportable   01/01/22  11:15    


 


Smudge Cells  Not Reportable   01/01/22  11:15    


 


Toxic Granulation  Not Reportable   01/01/22  11:15    


 


Toxic Vacuolation  Not Reportable   01/01/22  11:15    


 


Dohle Bodies  Not Reportable   01/01/22  11:15    


 


Pelger-Huet Anomaly  Not Reportable   01/01/22  11:15    


 


Shamir Rods  Not Reportable   01/01/22  11:15    


 


Platelet Estimate  Consistent w auto   01/01/22  11:15    


 


Clumped Platelets  Not Reportable   01/01/22  11:15    


 


Plt Clumps, EDTA  Not Reportable   01/01/22  11:15    


 


Large Platelets  Not Reportable   01/01/22  11:15    


 


Giant Platelets  Not Reportable   01/01/22  11:15    


 


Platelet Satelliting  Not Reportable   01/01/22  11:15    


 


Plt Morphology Comment  Not Reportable   01/01/22  11:15    


 


RBC Morphology  Not Reportable   01/01/22  11:15    


 


Dimorphic RBCs  Not Reportable   01/01/22  11:15    


 


Polychromasia  Not Reportable   01/01/22  11:15    


 


Hypochromasia  Not Reportable   01/01/22  11:15    


 


Poikilocytosis  Not Reportable   01/01/22  11:15    


 


Anisocytosis  Not Reportable   01/01/22  11:15    


 


Microcytosis  Not Reportable   01/01/22  11:15    


 


Macrocytosis  Not Reportable   01/01/22  11:15    


 


Spherocytes  Not Reportable   01/01/22  11:15    


 


Pappenheimer Bodies  Not Reportable   01/01/22  11:15    


 


Sickle Cells  Not Reportable   01/01/22  11:15    


 


Target Cells  Not Reportable   01/01/22  11:15    


 


Tear Drop Cells  Not Reportable   01/01/22  11:15    


 


Ovalocytes  Not Reportable   01/01/22  11:15    


 


Helmet Cells  Not Reportable   01/01/22  11:15    


 


Cuevas-[place] Bodies  Not Reportable   01/01/22  11:15    


 


Cabot Rings  Not Reportable   01/01/22  11:15    


 


Sepideh Cells  Not Reportable   01/01/22  11:15    


 


Bite Cells  Not Reportable   01/01/22  11:15    


 


Crenated Cell  Not Reportable   01/01/22  11:15    


 


Elliptocytes  Not Reportable   01/01/22  11:15    


 


Acanthocytes (Spur)  Not Reportable   01/01/22  11:15    


 


Rouleaux  Not Reportable   01/01/22  11:15    


 


Hemoglobin C Crystals  Not Reportable   01/01/22  11:15    


 


Schistocytes  Not Reportable   01/01/22  11:15    


 


Malaria parasites  Not Reportable   01/01/22  11:15    


 


Shaggy Bodies  Not Reportable   01/01/22  11:15    


 


Hem Pathologist Commnt  No   01/01/22  11:15    


 


PT  14.5 Sec. (12.2-14.9)   01/05/22  14:48    


 


INR  1.02  (0.87-1.13)   01/05/22  14:48    


 


APTT  33.1 Sec. (24.2-36.6)   01/05/22  14:48    


 


Heparin Anti-Xa Level  < 0.10 U.I./ml (0.3-0.7)  L  01/05/22  14:48    


 


Sodium  136 mmol/L (137-145)  L  01/05/22  14:48    


 


Potassium  4.9 mmol/L (3.6-5.0)  D 01/05/22  14:48    


 


Chloride  97.2 mmol/L ()  L  01/05/22  14:48    


 


Carbon Dioxide  29 mmol/L (22-30)   01/05/22  14:48    


 


Anion Gap  15 mmol/L  01/05/22  14:48    


 


BUN  9 mg/dL (9-20)   01/05/22  14:48    


 


Creatinine  0.6 mg/dL (0.8-1.3)  L  01/05/22  14:48    


 


Creatinine  0.6 mg/dL (0.8-1.3)  L  01/05/22  14:48    


 


Estimated GFR  > 60 ml/min  01/05/22  14:48    


 


Estimated GFR  > 60 ml/min  01/05/22  14:48    


 


BUN/Creatinine Ratio  15 %  01/05/22  14:48    


 


Glucose  96 mg/dL ()   01/05/22  14:48    


 


POC Glucose  95 mg/dL ()   01/03/22  07:38    


 


Calcium  8.5 mg/dL (8.4-10.2)   01/05/22  14:48    


 


Magnesium  1.70 mg/dL (1.7-2.3)   01/05/22  14:48    


 


Total Bilirubin  1.40 mg/dL (0.1-1.2)  H  12/30/21  23:51    


 


AST  28 units/L (5-40)   12/30/21  23:51    


 


ALT  17 units/L (7-56)   12/30/21  23:51    


 


Alkaline Phosphatase  141 units/L ()  H  12/30/21  23:51    


 


NT-Pro-B Natriuret Pep  1300 pg/mL (0-900)  H  12/30/21  23:51    


 


Total Protein  5.9 g/dL (6.3-8.2)  L  12/30/21  23:51    


 


Albumin  2.5 g/dL (3.9-5)  L  12/30/21  23:51    


 


Albumin/Globulin Ratio  0.7 %  12/30/21  23:51    


 


TSH  2.930 mlU/mL (0.270-4.200)   01/03/22  00:06    


 


Urine Color  Maren  (Yellow)   12/31/21  01:00    


 


Urine Turbidity  Slightly-cloudy  (Clear)   12/31/21  01:00    


 


Urine pH  6.0  (5.0-7.0)   12/31/21  01:00    


 


Ur Specific Gravity  1.012  (1.003-1.030)   12/31/21  01:00    


 


Urine Protein  30 mg/dl mg/dL (Negative)   12/31/21  01:00    


 


Urine Glucose (UA)  Neg mg/dL (Negative)   12/31/21  01:00    


 


Urine Ketones  Neg mg/dL (Negative)   12/31/21  01:00    


 


Urine Blood  Neg  (Negative)   12/31/21  01:00    


 


Urine Nitrite  Neg  (Negative)   12/31/21  01:00    


 


Urine Bilirubin  Neg  (Negative)   12/31/21  01:00    


 


Urine Urobilinogen  < 2.0 mg/dL (<2.0)   12/31/21  01:00    


 


Ur Leukocyte Esterase  Neg  (Negative)   12/31/21  01:00    


 


Urine WBC (Auto)  9.0 /HPF (0.0-6.0)  H  12/31/21  01:00    


 


Urine RBC (Auto)  3.0 /HPF (0.0-6.0)   12/31/21  01:00    


 


U Epithel Cells (Auto)  < 1.0 /HPF (0-13.0)   12/31/21  01:00    


 


Hyaline Casts  8 /LPF  12/31/21  01:00    


 


Urine Mucus  Few /HPF  12/31/21  01:00    











[place]/IV: 


                                        





Voiding Method                   Condom Catheter











Active Medications





- Current Medications


Current Medications: 














Generic Name Dose Route Start Last Admin





  Trade Name Freq  PRN Reason Stop Dose Admin


 


Acetaminophen  650 mg  12/31/21 03:51  01/02/22 22:07





  Acetaminophen 325 Mg Tab  PO   650 mg





  Q4H PRN   Administration





  Pain MILD(1-3)/Fever >100.5/HA  


 


Albuterol  2.5 mg  01/04/22 12:00 





  Albuterol 2.5 Mg/3 Ml Nebu  IH  





  Q4HRT PRN  





  Shortness Of Breath  


 


Albuterol/Ipratropium  1 ampul  01/04/22 14:00  01/06/22 08:24





  Ipratropium/Albuterol Sulfate 3 Ml Ampul.Neb  IH   1 ampul





  TIDRT SHAY   Administration


 


Apixaban  10 mg  01/05/22 10:00  01/06/22 09:42





  Apixaban 5 Mg Tab  PO  01/11/22 22:01  10 mg





  Q12HR SHAY   Administration





  Protocol  


 


Apixaban  5 mg  01/12/22 10:00 





  Apixaban 5 Mg Tab  PO  





  Q12HR SHAY  





  Protocol  


 


Famotidine  10 mg  12/31/21 10:00  01/06/22 09:43





  Famotidine 10 Mg Tab  PO   10 mg





  BID SHAY   Administration


 


Hydromorphone HCl  0.5 mg  12/31/21 03:51 





  Hydromorphone 1 Mg/1 Ml Inj  IV  





  Q3H PRN  





  Pain , Severe (7-10)  


 


Metoprolol Tartrate  12.5 mg  01/03/22 22:00  01/06/22 09:43





  Metoprolol Tartrate 25 Mg Tab  PO   12.5 mg





  BID SHAY   Administration


 


Morphine Sulfate  2 mg  12/31/21 03:51  01/06/22 09:43





  Morphine 2 Mg/1 Ml Inj  IV   2 mg





  Q4H PRN   Administration





  Pain, Moderate (4-6)  


 


Ondansetron HCl  4 mg  12/31/21 03:51 





  Ondansetron 4 Mg/2 Ml Inj  IV  





  Q8H PRN  





  Nausea And Vomiting  


 


Potassium Chloride  40 meq  01/01/22 17:00  01/06/22 09:42





  Potassium Chloride Er 20 Meq Tab  PO   40 meq





  QDAY SHAY   Administration


 


Sodium Chloride  10 ml  12/31/21 10:00  01/06/22 09:43





  Sodium Chloride 0.9% 10 Ml Flush Syringe  IV   10 ml





  BID SHAY   Administration


 


Sodium Chloride  10 ml  12/31/21 03:51 





  Sodium Chloride 0.9% 10 Ml Flush Syringe  IV  





  PRN PRN  





  LINE FLUSH  


 


Spironolactone  25 mg  12/31/21 16:00  01/06/22 09:42





  Spironolactone 25 Mg Tab  PO   25 mg





  QDAY SHAY   Administration














Nutrition/Malnutrition Assess





- Dietary Evaluation


Nutrition/Malnutrition Findings: 


                                        





Nutrition Notes                                            Start:  12/31/21 

19:00


Freq:                                                      Status: Active       




Protocol:                                                                       




 Document     01/05/22 14:24  ZACHARY  (Rec: 01/05/22 14:49  ZACHARY  [id number])


 Nutrition Notes


     Need for Assessment generated from:         RN Referral


     Initial or Follow up                        Assessment


     Current Diagnosis                           Decubitus(Pressure Ulcer),


                                                 Hypertension


     Other Pertinent Diagnosis                   Nephrotic syndrome, Anasarca,


                                                 CVA, Hypokalemia.


     Current Diet                                Cardiac Diet (since B 12/31),


                                                 D Suppl (since D 01/05).


     Labs/Tests                                  01/04: K 3.4, Crea 0.7, Ca 7.9


                                                 .


     Pertinent Medications                       01/05: KCl 40 mEq, others


                                                 nutritionally unremarkable.


     Height                                      5 ft 6 in


     Weight                                      78 kg


     Ideal Body Weight (kg)                      64.54


     BMI                                         27.7


     Weight change and time frame                3.647 Kg body weight loss in 5


                                                 days reported.


     Weight Status                               Overweight


     Subjective/Other Information                RD consult for Poor Oral


                                                 Intake assessment.


                                                 No report available on %PO


                                                 intake of meals at the time.


                                                 Malnutrition assessment weill


                                                 be provided at F/U, during


                                                 visit for Nutrition Education.


     Percent of energy/protein needs met:        Prescribed Cardiac Diet


                                                 provides for energy/protein


                                                 needs (2,230 Kcal/85 g) during


                                                 LOS; additionally, Dietary


                                                 Supplements will support wound


                                                 healing processes, and


                                                 compensate for possible Poor


                                                 PO intake of meals with 1,465


                                                 Kcal and 62 g of protein.


     Burn                                        Absent


     Trauma                                      Absent


     GI Symptoms                                 None


     Food Allergy                                No


     Skin Integrity/Comment                      Decubitus sacral pressure


                                                 wounds.


     #1


      Nutrition Diagnosis                        Increased nutrient needs   (


                                                 specify in comment below)


      Comments:                                  Protein to support wound


                                                 healing.


      Etiology                                   Ongoing and concomitant


                                                 chronic metabolic conditions.


      As Evidenced by Signs and Symptoms         Documents at admission.


     Is patient on ventilator?                   No


     Is Patient Ambulatory and/or Out of Bed     No


     REE-([facility]-confined to bed)      [phone]


     Kcal/Kg value to use for calculation        19


     Approximate Energy Requirements Using       1482


      kcal/Kg                                    


     Calculation Used for Recommendations        Kcal/kg


     Additional Notes                            Protein: 1-1.2 g/Kg; 78-94 g/


                                                 day.


                                                 Fluids: 1 ml/Kcal, or as per


                                                 MD.


 Nutrition Intervention


     Change Diet Order:                          Continue Cardiac Diet.


     Add Supplement/Snack (indicate name/kcal    8 fl oz Nepro w/Carbsteady;


      /protein )                                 TID.


                                                 28.8 g pkt Arturo; BID.


     Provides kCal:                              1,465


     Provides Protein (gm)                       62


     Goal #1                                     Support, through dietary


                                                 supplementation, wound healing


                                                 processes during LOS.


     Follow-Up By:                               01/07/22


     Additional Comments                         F/U Nutrition Education and


                                                 malnutrition assessment.


                                                 Continue monitoring food


                                                 tolerance, %PO intake of meals


                                                 , and BM.
Assessment and Plan


Assessment and plan: 





76-year-old  male with history of hypertension CVA and right hip fracture who is

presenting with leg swelling.





Anasarca





Left DVT





NSVT





Acute hypoxic respiratory failure





Hypokalemia





CVA





Hypertension





Hyponatremia





Nephrotic syndrome





Hospital Course


12/31: Follow up echo. Will hold off on lasix at this time due to low K. Replete

K with KCL IV (ordered 8 bags). May need albumin/lasix per nephrology. Follow up

diagnostic studies and cardiology recommendations.





01/01/22: Mild diastolic dysfunction, LVEF 55-65% per Echo read. Continue 

diuresis with spironalactone. Strict I/O's. May consider albumin/lasix if 

diuresis not adequate. Will continue to follow nephrology and cardiology 

recommendations. Anticipate dc in next 24-48 hrs. 





01/02/22: administered albumin + lasix 40 IV x 1.





01/03/22: albumin lasix ordered. US of lower extremity demonstrates DVT in 

popliteal vein. Initiated on heparin gtt.





1/4/2022: Transition from heparin drip to oral anticoagulation if okay with 

nephrology.  Hypokalemia improved.  Nephrology following.  At this time 

urinalysis does not show any evidence of blood only 30 mg of protein.  Albumin 

however is 2.5 which is very concerning rule out other causes for malnutrition/?

 Liver disease, bilirubin noted to be elevated





1/5/2022: We will transition from heparin drip to Eliquis.  Nephrology to check 

magnesium and BMP.  Nurse reports patient has very poor p.o. intake.  Will 

consult dietitian for further evaluation.





History


Interval history: 





No new issues overnight.





Hospitalist Physical





- Constitutional


Vitals: 


                                        











Temp Pulse Resp BP Pulse Ox


 


 97.9 F   86   22   110/64   97 


 


 01/05/22 09:15  01/05/22 09:18  01/05/22 09:18  01/05/22 09:15  01/05/22 09:17











General appearance: Present: no acute distress, well-nourished





- EENT


Eyes: Present: PERRL, EOM intact


ENT: hearing intact, clear oral mucosa, dentition normal





- Neck


Neck: Present: supple, normal ROM





- Respiratory


Respiratory effort: normal


Respiratory: bilateral: CTA





- Cardiovascular


Rhythm: regular


Heart Sounds: Present: S1 & S2.  Absent: gallop, rub





- Extremities


Extremities: no ischemia, No edema, Full ROM





- Abdominal


General gastrointestinal: soft, non-tender, non-distended, normal bowel sounds





- Integumentary


Integumentary: Present: clear, warm, dry





- Neurologic


Neurologic: CNII-XII intact, moves all extremities





Results





- Labs


CBC & Chem 7: 


                                 01/05/22 02:23





                                 01/04/22 13:35


Labs: 


                             Laboratory Last Values











WBC  9.0 K/mm3 (4.5-11.0)   01/04/22  13:35    


 


RBC  3.38 M/mm3 (3.65-5.03)  L  01/04/22  13:35    


 


Hgb  9.4 gm/dl (11.8-15.2)  L  01/05/22  02:23    


 


Hct  29.3 % (35.5-45.6)  L  01/05/22  02:23    


 


MCV  85 fl (84-94)   01/04/22  13:35    


 


MCH  28 pg (28-32)   01/04/22  13:35    


 


MCHC  33 % (32-34)   01/04/22  13:35    


 


RDW  20.8 % (13.2-15.2)  H  01/04/22  13:35    


 


Plt Count  261 K/mm3 (140-440)   01/05/22  02:23    


 


Lymph % (Auto)  Np   01/01/22  11:15    


 


Mono % (Auto)  Np   01/01/22  11:15    


 


Eos % (Auto)  Np   01/01/22  11:15    


 


Baso % (Auto)  Np   01/01/22  11:15    


 


Lymph # (Auto)  Np   01/01/22  11:15    


 


Mono # (Auto)  Np   01/01/22  11:15    


 


Eos # (Auto)  Np   01/01/22  11:15    


 


Baso # (Auto)  Np   01/01/22  11:15    


 


Add Manual Diff  Complete   01/01/22  11:15    


 


Total Counted  100   01/01/22  11:15    


 


Seg Neutrophils %  Np   01/01/22  11:15    


 


Seg Neuts % (Manual)  72.0 % (40.0-70.0)  H  01/01/22  11:15    


 


Lymphocytes % (Manual)  24.0 % (13.4-35.0)   01/01/22  11:15    


 


Monocytes % (Manual)  4.0 % (0.0-7.3)   01/01/22  11:15    


 


Nucleated RBC %  Not Reportable   01/01/22  11:15    


 


Seg Neutrophils #  Np   01/01/22  11:15    


 


Seg Neutrophils # Man  4.2 K/mm3 (1.8-7.7)   01/01/22  11:15    


 


Band Neutrophils #  0.0 K/mm3  01/01/22  11:15    


 


Lymphocytes # (Manual)  1.4 K/mm3 (1.2-5.4)   01/01/22  11:15    


 


Abs React Lymphs (Man)  0.0 K/mm3  01/01/22  11:15    


 


Monocytes # (Manual)  0.2 K/mm3 (0.0-0.8)   01/01/22  11:15    


 


Eosinophils # (Manual)  0.0 K/mm3 (0.0-0.4)   01/01/22  11:15    


 


Basophils # (Manual)  0.0 K/mm3 (0.0-0.1)   01/01/22  11:15    


 


Metamyelocytes #  0.0 K/mm3  01/01/22  11:15    


 


Myelocytes #  0.0 K/mm3  01/01/22  11:15    


 


Promyelocytes #  0.0 K/mm3  01/01/22  11:15    


 


Blast Cells #  0.0 K/mm3  01/01/22  11:15    


 


WBC Morphology  Not Reportable   01/01/22  11:15    


 


Hypersegmented Neuts  Not Reportable   01/01/22  11:15    


 


Hyposegmented Neuts  Not Reportable   01/01/22  11:15    


 


Hypogranular Neuts  Not Reportable   01/01/22  11:15    


 


Smudge Cells  Not Reportable   01/01/22  11:15    


 


Toxic Granulation  Not Reportable   01/01/22  11:15    


 


Toxic Vacuolation  Not Reportable   01/01/22  11:15    


 


Dohle Bodies  Not Reportable   01/01/22  11:15    


 


Pelger-Huet Anomaly  Not Reportable   01/01/22  11:15    


 


Shamir Rods  Not Reportable   01/01/22  11:15    


 


Platelet Estimate  Consistent w auto   01/01/22  11:15    


 


Clumped Platelets  Not Reportable   01/01/22  11:15    


 


Plt Clumps, EDTA  Not Reportable   01/01/22  11:15    


 


Large Platelets  Not Reportable   01/01/22  11:15    


 


Giant Platelets  Not Reportable   01/01/22  11:15    


 


Platelet Satelliting  Not Reportable   01/01/22  11:15    


 


Plt Morphology Comment  Not Reportable   01/01/22  11:15    


 


RBC Morphology  Not Reportable   01/01/22  11:15    


 


Dimorphic RBCs  Not Reportable   01/01/22  11:15    


 


Polychromasia  Not Reportable   01/01/22  11:15    


 


Hypochromasia  Not Reportable   01/01/22  11:15    


 


Poikilocytosis  Not Reportable   01/01/22  11:15    


 


Anisocytosis  Not Reportable   01/01/22  11:15    


 


Microcytosis  Not Reportable   01/01/22  11:15    


 


Macrocytosis  Not Reportable   01/01/22  11:15    


 


Spherocytes  Not Reportable   01/01/22  11:15    


 


Pappenheimer Bodies  Not Reportable   01/01/22  11:15    


 


Sickle Cells  Not Reportable   01/01/22  11:15    


 


Target Cells  Not Reportable   01/01/22  11:15    


 


Tear Drop Cells  Not Reportable   01/01/22  11:15    


 


Ovalocytes  Not Reportable   01/01/22  11:15    


 


Helmet Cells  Not Reportable   01/01/22  11:15    


 


[place] Bodies  Not Reportable   01/01/22  11:15    


 


Cabot Rings  Not Reportable   01/01/22  11:15    


 


[place] Cells  Not Reportable   01/01/22  11:15    


 


Bite Cells  Not Reportable   01/01/22  11:15    


 


Crenated Cell  Not Reportable   01/01/22  11:15    


 


Elliptocytes  Not Reportable   01/01/22  11:15    


 


Acanthocytes (Spur)  Not Reportable   01/01/22  11:15    


 


Rouleaux  Not Reportable   01/01/22  11:15    


 


Hemoglobin C Crystals  Not Reportable   01/01/22  11:15    


 


Schistocytes  Not Reportable   01/01/22  11:15    


 


Malaria parasites  Not Reportable   01/01/22  11:15    


 


Shaggy Bodies  Not Reportable   01/01/22  11:15    


 


Hem Pathologist Commnt  No   01/01/22  11:15    


 


PT  15.7 Sec. (12.2-14.9)  H  01/04/22  10:32    


 


INR  1.13  (0.87-1.13)   01/04/22  10:32    


 


APTT  171.6 Sec. (24.2-36.6)  H*  01/04/22  13:49    


 


Heparin Anti-Xa Level  < 0.10 U.I./ml (0.3-0.7)  L  01/05/22  02:23    


 


Sodium  140 mmol/L (137-145)  D 01/04/22  04:48    


 


Potassium  3.4 mmol/L (3.6-5.0)  L  01/04/22  04:48    


 


Chloride  99.6 mmol/L ()   01/04/22  04:48    


 


Carbon Dioxide  28 mmol/L (22-30)  D 01/04/22  04:48    


 


Anion Gap  16 mmol/L  01/04/22  04:48    


 


BUN  11 mg/dL (9-20)   01/04/22  04:48    


 


Creatinine  0.6 mg/dL (0.8-1.3)  L  01/04/22  13:35    


 


Estimated GFR  > 60 ml/min  01/04/22  13:35    


 


BUN/Creatinine Ratio  16 %  01/04/22  04:48    


 


Glucose  84 mg/dL ()   01/04/22  04:48    


 


POC Glucose  95 mg/dL ()   01/03/22  07:38    


 


Calcium  7.9 mg/dL (8.4-10.2)  L  01/04/22  04:48    


 


Magnesium  1.90 mg/dL (1.7-2.3)   01/01/22  11:15    


 


Total Bilirubin  1.40 mg/dL (0.1-1.2)  H  12/30/21  23:51    


 


AST  28 units/L (5-40)   12/30/21  23:51    


 


ALT  17 units/L (7-56)   12/30/21  23:51    


 


Alkaline Phosphatase  141 units/L ()  H  12/30/21  23:51    


 


NT-Pro-B Natriuret Pep  1300 pg/mL (0-900)  H  12/30/21  23:51    


 


Total Protein  5.9 g/dL (6.3-8.2)  L  12/30/21  23:51    


 


Albumin  2.5 g/dL (3.9-5)  L  12/30/21  23:51    


 


Albumin/Globulin Ratio  0.7 %  12/30/21  23:51    


 


TSH  2.930 mlU/mL (0.270-4.200)   01/03/22  00:06    


 


Urine Color  Maren  (Yellow)   12/31/21  01:00    


 


Urine Turbidity  Slightly-cloudy  (Clear)   12/31/21  01:00    


 


Urine pH  6.0  (5.0-7.0)   12/31/21  01:00    


 


Ur Specific Gravity  1.012  (1.003-1.030)   12/31/21  01:00    


 


Urine Protein  30 mg/dl mg/dL (Negative)   12/31/21  01:00    


 


Urine Glucose (UA)  Neg mg/dL (Negative)   12/31/21  01:00    


 


Urine Ketones  Neg mg/dL (Negative)   12/31/21  01:00    


 


Urine Blood  Neg  (Negative)   12/31/21  01:00    


 


Urine Nitrite  Neg  (Negative)   12/31/21  01:00    


 


Urine Bilirubin  Neg  (Negative)   12/31/21  01:00    


 


Urine Urobilinogen  < 2.0 mg/dL (<2.0)   12/31/21  01:00    


 


Ur Leukocyte Esterase  Neg  (Negative)   12/31/21  01:00    


 


Urine WBC (Auto)  9.0 /HPF (0.0-6.0)  H  12/31/21  01:00    


 


Urine RBC (Auto)  3.0 /HPF (0.0-6.0)   12/31/21  01:00    


 


U Epithel Cells (Auto)  < 1.0 /HPF (0-13.0)   12/31/21  01:00    


 


Hyaline Casts  8 /LPF  12/31/21  01:00    


 


Urine Mucus  Few /HPF  12/31/21  01:00    











Acosta/IV: 


                                        





Voiding Method                   Condom Catheter











Active Medications





- Current Medications


Current Medications: 














Generic Name Dose Route Start Last Admin





  Trade Name Freq  PRN Reason Stop Dose Admin


 


Acetaminophen  650 mg  12/31/21 03:51  01/02/22 22:07





  Acetaminophen 325 Mg Tab  PO   650 mg





  Q4H PRN   Administration





  Pain MILD(1-3)/Fever >100.5/HA  


 


Albuterol  2.5 mg  01/04/22 12:00 





  Albuterol 2.5 Mg/3 Ml Nebu  IH  





  Q4HRT PRN  





  Shortness Of Breath  


 


Albuterol/Ipratropium  1 ampul  01/04/22 14:00  01/05/22 09:17





  Ipratropium/Albuterol Sulfate 3 Ml Ampul.Neb  IH   1 ampul





  TIDRT SHAY   Administration


 


Famotidine  10 mg  12/31/21 10:00  01/04/22 22:57





  Famotidine 10 Mg Tab  PO   10 mg





  BID SHAY   Administration


 


Heparin Sodium (Porcine)  3,000 unit  01/04/22 11:30 





  Heparin 10,000 Units/10 Ml Vial  IV  





  Q6H PRN  





  Anti-Xa Assay < 0.1 units/ml  


 


Hydromorphone HCl  0.5 mg  12/31/21 03:51 





  Hydromorphone 1 Mg/1 Ml Inj  IV  





  Q3H PRN  





  Pain , Severe (7-10)  


 


Heparin Sodium/Sodium Chloride  25,000 unit in 500 mls @ 23 mls/hr  01/04/22 

11:30  01/05/22 06:31





  Heparin/ 0.45% Nacl-25,000 Unit/500 Ml  IV   1,350 units/hr





  TITR SHAY   27 mls/hr





    Administration





  Protocol  





  1,150 UNITS/HR  


 


Metoprolol Tartrate  12.5 mg  01/03/22 22:00  01/04/22 22:55





  Metoprolol Tartrate 25 Mg Tab  PO   Not Given





  BID SHAY  


 


Morphine Sulfate  2 mg  12/31/21 03:51  01/03/22 18:25





  Morphine 2 Mg/1 Ml Inj  IV   2 mg





  Q4H PRN   Administration





  Pain, Moderate (4-6)  


 


Ondansetron HCl  4 mg  12/31/21 03:51 





  Ondansetron 4 Mg/2 Ml Inj  IV  





  Q8H PRN  





  Nausea And Vomiting  


 


Potassium Chloride  40 meq  01/01/22 17:00  01/04/22 10:38





  Potassium Chloride Er 20 Meq Tab  PO   40 meq





  QDAY SHAY   Administration


 


Sodium Chloride  10 ml  12/31/21 10:00  01/03/22 21:24





  Sodium Chloride 0.9% 10 Ml Flush Syringe  IV   10 ml





  BID SHAY   Administration


 


Sodium Chloride  10 ml  12/31/21 03:51 





  Sodium Chloride 0.9% 10 Ml Flush Syringe  IV  





  PRN PRN  





  LINE FLUSH  


 


Spironolactone  25 mg  12/31/21 16:00  01/04/22 10:37





  Spironolactone 25 Mg Tab  PO   25 mg





  QDAY SHAY   Administration














Nutrition/Malnutrition Assess





- Dietary Evaluation


Nutrition/Malnutrition Findings: 


                                        





Nutrition Notes                                            Start:  12/31/21 

19:00


Freq:                                                      Status: Active       




Protocol:                                                                       




 Document     12/31/21 19:00  ZACHARY  (Rec: 12/31/21 19:10  ZACHARY  [id number])


 Nutrition Notes


     Need for Assessment generated from:         MD Order,Education


     Initial or Follow up                        Brief Note


     Current Diagnosis                           Hypertension


     Other Pertinent Diagnosis                   Nephrotic syndrome, Anasarca,


                                                 CVA, Hypokalemia, Hyponatremia


                                                 .


     Current Diet                                Cardiac Diet (since B 12/31).


     Height                                      5 ft 6 in


     Weight                                      81.647 kg


     Ideal Body Weight (kg)                      64.54


     BMI                                         29.0


     Weight change and time frame                None reported at admission.


     Weight Status                               Overweight


     Subjective/Other Information                RD consult for nutrition


                                                 education request.


                                                 Pt is not a candidate for


                                                 nutrition education at the


                                                 time. If feasible, nutrition


                                                 education will be provided at


                                                 F/U.


     Percent of energy/protein needs met:        Prescribed Cardiac Diet


                                                 provides for energy/protein


                                                 needs (2,230 Kcal/85 g) during


                                                 LOS.


 Nutrition Intervention


     Change Diet Order:                          Continue Cardiac Diet.


     Follow-Up By:                               01/07/22


     Additional Comments                         Continue monitoring food


                                                 tolerance, %PO intake of meals


                                                 , and BM.
Assessment and Plan


Assessment and plan: 





76-year-old  male with history of hypertension CVA and right hip fracture who is

presenting with leg swelling.





Anasarca





Left DVT





NSVT





Acute hypoxic respiratory failure





Hypokalemia





CVA





Hypertension





Hyponatremia





Nephrotic syndrome





Hospital Course


12/31: Follow up echo. Will hold off on lasix at this time due to low K. Replete

K with KCL IV (ordered 8 bags). May need albumin/lasix per nephrology. Follow up

diagnostic studies and cardiology recommendations.





01/01/22: Mild diastolic dysfunction, LVEF 55-65% per Echo read. Continue 

diuresis with spironalactone. Strict I/O's. May consider albumin/lasix if 

diuresis not adequate. Will continue to follow nephrology and cardiology 

recommendations. Anticipate dc in next 24-48 hrs. 





01/02/22: administered albumin + lasix 40 IV x 1.





01/03/22: albumin lasix ordered. US of lower extremity demonstrates DVT in 

popliteal vein. Initiated on heparin gtt.





1/4/2022: Transition from heparin drip to oral anticoagulation if okay with 

nephrology.  Hypokalemia improved.  Nephrology following.  At this time 

urinalysis does not show any evidence of blood only 30 mg of protein.  Albumin 

however is 2.5 which is very concerning rule out other causes for malnutrition/?

 Liver disease, bilirubin noted to be elevated





History


Interval history: 





No new issues overnight.





Hospitalist Physical





- Constitutional


Vitals: 


                                        











Temp Pulse Resp BP Pulse Ox


 


 98.0 F   99 H  18   124/64   100 


 


 01/04/22 08:00  01/04/22 10:37  01/04/22 09:32  01/04/22 08:00  01/04/22 09:36











General appearance: Present: no acute distress, well-nourished





- EENT


Eyes: Present: PERRL, EOM intact


ENT: hearing intact, clear oral mucosa, dentition normal





- Neck


Neck: Present: supple, normal ROM





- Respiratory


Respiratory effort: normal


Respiratory: bilateral: CTA





- Cardiovascular


Rhythm: regular


Heart Sounds: Present: S1 & S2.  Absent: gallop, rub





- Extremities


Extremities: no ischemia, No edema, Full ROM





- Abdominal


General gastrointestinal: soft, non-tender, non-distended, normal bowel sounds





- Integumentary


Integumentary: Present: clear, warm, dry





- Neurologic


Neurologic: CNII-XII intact, moves all extremities





Results





- Labs


CBC & Chem 7: 


                                 01/03/22 20:38





                                 01/04/22 04:48


Labs: 


                             Laboratory Last Values











WBC  5.8 K/mm3 (4.5-11.0)   01/01/22  11:15    


 


RBC  4.05 M/mm3 (3.65-5.03)   01/01/22  11:15    


 


Hgb  9.0 gm/dl (11.8-15.2)  L  01/03/22  20:38    


 


Hct  28.8 % (35.5-45.6)  L D 01/03/22  20:38    


 


MCV  88 fl (84-94)   01/01/22  11:15    


 


MCH  28 pg (28-32)   01/01/22  11:15    


 


MCHC  32 % (32-34)   01/01/22  11:15    


 


RDW  20.6 % (13.2-15.2)  H  01/01/22  11:15    


 


Plt Count  247 K/mm3 (140-440)   01/03/22  20:38    


 


Lymph % (Auto)  Np   01/01/22  11:15    


 


Mono % (Auto)  Np   01/01/22  11:15    


 


Eos % (Auto)  Np   01/01/22  11:15    


 


Baso % (Auto)  Np   01/01/22  11:15    


 


Lymph # (Auto)  Np   01/01/22  11:15    


 


Mono # (Auto)  Np   01/01/22  11:15    


 


Eos # (Auto)  Np   01/01/22  11:15    


 


Baso # (Auto)  Np   01/01/22  11:15    


 


Add Manual Diff  Complete   01/01/22  11:15    


 


Total Counted  100   01/01/22  11:15    


 


Seg Neutrophils %  Np   01/01/22  11:15    


 


Seg Neuts % (Manual)  72.0 % (40.0-70.0)  H  01/01/22  11:15    


 


Lymphocytes % (Manual)  24.0 % (13.4-35.0)   01/01/22  11:15    


 


Monocytes % (Manual)  4.0 % (0.0-7.3)   01/01/22  11:15    


 


Nucleated RBC %  Not Reportable   01/01/22  11:15    


 


Seg Neutrophils #  Np   01/01/22  11:15    


 


Seg Neutrophils # Man  4.2 K/mm3 (1.8-7.7)   01/01/22  11:15    


 


Band Neutrophils #  0.0 K/mm3  01/01/22  11:15    


 


Lymphocytes # (Manual)  1.4 K/mm3 (1.2-5.4)   01/01/22  11:15    


 


Abs React Lymphs (Man)  0.0 K/mm3  01/01/22  11:15    


 


Monocytes # (Manual)  0.2 K/mm3 (0.0-0.8)   01/01/22  11:15    


 


Eosinophils # (Manual)  0.0 K/mm3 (0.0-0.4)   01/01/22  11:15    


 


Basophils # (Manual)  0.0 K/mm3 (0.0-0.1)   01/01/22  11:15    


 


Metamyelocytes #  0.0 K/mm3  01/01/22  11:15    


 


Myelocytes #  0.0 K/mm3  01/01/22  11:15    


 


Promyelocytes #  0.0 K/mm3  01/01/22  11:15    


 


Blast Cells #  0.0 K/mm3  01/01/22  11:15    


 


WBC Morphology  Not Reportable   01/01/22  11:15    


 


Hypersegmented Neuts  Not Reportable   01/01/22  11:15    


 


Hyposegmented Neuts  Not Reportable   01/01/22  11:15    


 


Hypogranular Neuts  Not Reportable   01/01/22  11:15    


 


Smudge Cells  Not Reportable   01/01/22  11:15    


 


Toxic Granulation  Not Reportable   01/01/22  11:15    


 


Toxic Vacuolation  Not Reportable   01/01/22  11:15    


 


Dohle Bodies  Not Reportable   01/01/22  11:15    


 


Pelger-Huet Anomaly  Not Reportable   01/01/22  11:15    


 


Shamir Rods  Not Reportable   01/01/22  11:15    


 


Platelet Estimate  Consistent w auto   01/01/22  11:15    


 


Clumped Platelets  Not Reportable   01/01/22  11:15    


 


Plt Clumps, EDTA  Not Reportable   01/01/22  11:15    


 


Large Platelets  Not Reportable   01/01/22  11:15    


 


Giant Platelets  Not Reportable   01/01/22  11:15    


 


Platelet Satelliting  Not Reportable   01/01/22  11:15    


 


Plt Morphology Comment  Not Reportable   01/01/22  11:15    


 


RBC Morphology  Not Reportable   01/01/22  11:15    


 


Dimorphic RBCs  Not Reportable   01/01/22  11:15    


 


Polychromasia  Not Reportable   01/01/22  11:15    


 


Hypochromasia  Not Reportable   01/01/22  11:15    


 


Poikilocytosis  Not Reportable   01/01/22  11:15    


 


Anisocytosis  Not Reportable   01/01/22  11:15    


 


Microcytosis  Not Reportable   01/01/22  11:15    


 


Macrocytosis  Not Reportable   01/01/22  11:15    


 


Spherocytes  Not Reportable   01/01/22  11:15    


 


Pappenheimer Bodies  Not Reportable   01/01/22  11:15    


 


Sickle Cells  Not Reportable   01/01/22  11:15    


 


Target Cells  Not Reportable   01/01/22  11:15    


 


Tear Drop Cells  Not Reportable   01/01/22  11:15    


 


Ovalocytes  Not Reportable   01/01/22  11:15    


 


Helmet Cells  Not Reportable   01/01/22  11:15    


 


[place] Bodies  Not Reportable   01/01/22  11:15    


 


Cabot Rings  Not Reportable   01/01/22  11:15    


 


[place] Cells  Not Reportable   01/01/22  11:15    


 


Bite Cells  Not Reportable   01/01/22  11:15    


 


Crenated Cell  Not Reportable   01/01/22  11:15    


 


Elliptocytes  Not Reportable   01/01/22  11:15    


 


Acanthocytes (Spur)  Not Reportable   01/01/22  11:15    


 


Rouleaux  Not Reportable   01/01/22  11:15    


 


Hemoglobin C Crystals  Not Reportable   01/01/22  11:15    


 


Schistocytes  Not Reportable   01/01/22  11:15    


 


Malaria parasites  Not Reportable   01/01/22  11:15    


 


Shaggy Bodies  Not Reportable   01/01/22  11:15    


 


Hem Pathologist Commnt  No   01/01/22  11:15    


 


PT  16.5 Sec. (12.2-14.9)  H  01/03/22  20:38    


 


INR  1.20  (0.87-1.13)  H  01/03/22  20:38    


 


APTT  67.8 Sec. (24.2-36.6)  H*  01/03/22  20:38    


 


Heparin Anti-Xa Level  0.39 U.I./ml (0.3-0.7)   01/04/22  10:32    


 


Sodium  140 mmol/L (137-145)  D 01/04/22  04:48    


 


Potassium  3.4 mmol/L (3.6-5.0)  L  01/04/22  04:48    


 


Chloride  99.6 mmol/L ()   01/04/22  04:48    


 


Carbon Dioxide  28 mmol/L (22-30)  D 01/04/22  04:48    


 


Anion Gap  16 mmol/L  01/04/22  04:48    


 


BUN  11 mg/dL (9-20)   01/04/22  04:48    


 


Creatinine  0.7 mg/dL (0.8-1.3)  L  01/04/22  04:48    


 


Estimated GFR  > 60 ml/min  01/04/22  04:48    


 


BUN/Creatinine Ratio  16 %  01/04/22  04:48    


 


Glucose  84 mg/dL ()   01/04/22  04:48    


 


POC Glucose  95 mg/dL ()   01/03/22  07:38    


 


Calcium  7.9 mg/dL (8.4-10.2)  L  01/04/22  04:48    


 


Magnesium  1.90 mg/dL (1.7-2.3)   01/01/22  11:15    


 


Total Bilirubin  1.40 mg/dL (0.1-1.2)  H  12/30/21  23:51    


 


AST  28 units/L (5-40)   12/30/21  23:51    


 


ALT  17 units/L (7-56)   12/30/21  23:51    


 


Alkaline Phosphatase  141 units/L ()  H  12/30/21  23:51    


 


NT-Pro-B Natriuret Pep  1300 pg/mL (0-900)  H  12/30/21  23:51    


 


Total Protein  5.9 g/dL (6.3-8.2)  L  12/30/21  23:51    


 


Albumin  2.5 g/dL (3.9-5)  L  12/30/21  23:51    


 


Albumin/Globulin Ratio  0.7 %  12/30/21  23:51    


 


TSH  2.930 mlU/mL (0.270-4.200)   01/03/22  00:06    


 


Urine Color  Maren  (Yellow)   12/31/21  01:00    


 


Urine Turbidity  Slightly-cloudy  (Clear)   12/31/21  01:00    


 


Urine pH  6.0  (5.0-7.0)   12/31/21  01:00    


 


Ur Specific Gravity  1.012  (1.003-1.030)   12/31/21  01:00    


 


Urine Protein  30 mg/dl mg/dL (Negative)   12/31/21  01:00    


 


Urine Glucose (UA)  Neg mg/dL (Negative)   12/31/21  01:00    


 


Urine Ketones  Neg mg/dL (Negative)   12/31/21  01:00    


 


Urine Blood  Neg  (Negative)   12/31/21  01:00    


 


Urine Nitrite  Neg  (Negative)   12/31/21  01:00    


 


Urine Bilirubin  Neg  (Negative)   12/31/21  01:00    


 


Urine Urobilinogen  < 2.0 mg/dL (<2.0)   12/31/21  01:00    


 


Ur Leukocyte Esterase  Neg  (Negative)   12/31/21  01:00    


 


Urine WBC (Auto)  9.0 /HPF (0.0-6.0)  H  12/31/21  01:00    


 


Urine RBC (Auto)  3.0 /HPF (0.0-6.0)   12/31/21  01:00    


 


U Epithel Cells (Auto)  < 1.0 /HPF (0-13.0)   12/31/21  01:00    


 


Hyaline Casts  8 /LPF  12/31/21  01:00    


 


Urine Mucus  Few /HPF  12/31/21  01:00    











Microbiology: 


Microbiology





12/31/21 01:00   Urine,Clean Catch   Urine Culture - Final


                            NO GROWTH AFTER 48 HOURS








Acosta/IV: 


                                        





Voiding Method                   Condom Catheter











Active Medications





- Current Medications


Current Medications: 














Generic Name Dose Route Start Last Admin





  Trade Name Freq  PRN Reason Stop Dose Admin


 


Acetaminophen  650 mg  12/31/21 03:51  01/02/22 22:07





  Acetaminophen 325 Mg Tab  PO   650 mg





  Q4H PRN   Administration





  Pain MILD(1-3)/Fever >100.5/HA  


 


Albuterol  2.5 mg  01/04/22 12:00 





  Albuterol 2.5 Mg/3 Ml Nebu  IH  





  Q4HRT PRN  





  Shortness Of Breath  


 


Albuterol/Ipratropium  1 ampul  01/04/22 14:00 





  Ipratropium/Albuterol Sulfate 3 Ml Ampul.Neb  IH  





  TIDRT SHAY  


 


Famotidine  10 mg  12/31/21 10:00  01/04/22 10:38





  Famotidine 10 Mg Tab  PO   10 mg





  BID SHAY   Administration


 


Heparin Sodium (Porcine)  3,100 unit  01/03/22 16:43 





  Heparin 10,000 Units/10 Ml Vial  40 unit/kg (3100 unit)  





  IV  





  Q6H PRN  





  Anti-Xa Assay < 0.1 units/ml  


 


Hydromorphone HCl  0.5 mg  12/31/21 03:51 





  Hydromorphone 1 Mg/1 Ml Inj  IV  





  Q3H PRN  





  Pain , Severe (7-10)  


 


Heparin Sodium/Sodium Chloride  25,000 unit in 500 mls @ 23 mls/hr  01/03/22 

17:00  01/04/22 02:28





  Heparin/ 0.45% Nacl-25,000 Unit/500 Ml  IV   1,250 units/hr





  TITR SHAY   25 mls/hr





    Titration





  Protocol  





  1,150 UNITS/HR  


 


Metoprolol Tartrate  12.5 mg  01/03/22 22:00  01/04/22 10:37





  Metoprolol Tartrate 25 Mg Tab  PO   12.5 mg





  BID SHAY   Administration


 


Morphine Sulfate  2 mg  12/31/21 03:51  01/03/22 18:25





  Morphine 2 Mg/1 Ml Inj  IV   2 mg





  Q4H PRN   Administration





  Pain, Moderate (4-6)  


 


Ondansetron HCl  4 mg  12/31/21 03:51 





  Ondansetron 4 Mg/2 Ml Inj  IV  





  Q8H PRN  





  Nausea And Vomiting  


 


Potassium Chloride  40 meq  01/01/22 17:00  01/04/22 10:38





  Potassium Chloride Er 20 Meq Tab  PO   40 meq





  QDAY SHAY   Administration


 


Sodium Chloride  10 ml  12/31/21 10:00  01/03/22 21:24





  Sodium Chloride 0.9% 10 Ml Flush Syringe  IV   10 ml





  BID SHAY   Administration


 


Sodium Chloride  10 ml  12/31/21 03:51 





  Sodium Chloride 0.9% 10 Ml Flush Syringe  IV  





  PRN PRN  





  LINE FLUSH  


 


Spironolactone  25 mg  12/31/21 16:00  01/04/22 10:37





  Spironolactone 25 Mg Tab  PO   25 mg





  QDAY SHAY   Administration














Nutrition/Malnutrition Assess





- Dietary Evaluation


Nutrition/Malnutrition Findings: 


                                        





Nutrition Notes                                            Start:  12/31/21 

19:00


Freq:                                                      Status: Active       




Protocol:                                                                       




 Document     12/31/21 19:00  ZACHARY  (Rec: 12/31/21 19:10  ZACHARY  [id number])


 Nutrition Notes


     Need for Assessment generated from:         MD Order,Education


     Initial or Follow up                        Brief Note


     Current Diagnosis                           Hypertension


     Other Pertinent Diagnosis                   Nephrotic syndrome, Anasarca,


                                                 CVA, Hypokalemia, Hyponatremia


                                                 .


     Current Diet                                Cardiac Diet (since B 12/31).


     Height                                      5 ft 6 in


     Weight                                      81.647 kg


     Ideal Body Weight (kg)                      64.54


     BMI                                         29.0


     Weight change and time frame                None reported at admission.


     Weight Status                               Overweight


     Subjective/Other Information                RD consult for nutrition


                                                 education request.


                                                 Pt is not a candidate for


                                                 nutrition education at the


                                                 time. If feasible, nutrition


                                                 education will be provided at


                                                 F/U.


     Percent of energy/protein needs met:        Prescribed Cardiac Diet


                                                 provides for energy/protein


                                                 needs (2,230 Kcal/85 g) during


                                                 LOS.


 Nutrition Intervention


     Change Diet Order:                          Continue Cardiac Diet.


     Follow-Up By:                               01/07/22


     Additional Comments                         Continue monitoring food


                                                 tolerance, %PO intake of meals


                                                 , and BM.
Assessment and Plan


Assessment and plan: 





76-year-old  male with history of hypertension CVA and right hip fracture who is

presenting with leg swelling.





Sepsis.  Not present on admission 





anasarca





Left DVT





NSVT





Acute hypoxic respiratory failure





Hypokalemia





CVA





Hypertension





Hyponatremia





Nephrotic syndrome





Hospital Course


12/31: Follow up echo. Will hold off on lasix at this time due to low K. Replete

K with KCL IV (ordered 8 bags). May need albumin/lasix per nephrology. Follow up

diagnostic studies and cardiology recommendations.





01/01/22: Mild diastolic dysfunction, LVEF 55-65% per Echo read. Continue 

diuresis with spironalactone. Strict I/O's. May consider albumin/lasix if 

diuresis not adequate. Will continue to follow nephrology and cardiology 

recommendations. Anticipate dc in next 24-48 hrs. 





01/02/22: administered albumin + lasix 40 IV x 1.





01/03/22: albumin lasix ordered. US of lower extremity demonstrates DVT in 

popliteal vein. Initiated on heparin gtt.





1/4/2022: Transition from heparin drip to oral anticoagulation if okay with 

nephrology.  Hypokalemia improved.  Nephrology following.  At this time 

urinalysis does not show any evidence of blood only 30 mg of protein.  Albumin 

however is 2.5 which is very concerning rule out other causes for malnutrition/?

 Liver disease, bilirubin noted to be elevated





1/5/2022: We will transition from heparin drip to Eliquis.  Nephrology to check 

magnesium and BMP.  Nurse reports patient has very poor p.o. intake.  Will 

consult dietitian for further evaluation.





1/5/2022:  Continue eliquis.  Nutritionist added supplements with Nepro and 

Arturo twice daily.  Acute kidney injury hyponatremia has resolved.  Chest x-ray 

shows no acute findings.  Anticipate discharge in a.m.





1/6/2022.  Patient's oxygen requirement has been increased to 15 L on Venturi 

mask with FiO2 of 50%.  We will check CTA of the chest to rule out PE especially

given left DVT and respiratory compromise.  Patient was on 2 L satting at 97% 

yesterday.  Pulmonary consultation





1/7/2022.  CT of the chest revealed right upper lobe and middle lobe pneumonia 

that may represent aspiration.  We will consult speech therapy for further 

evaluation.  Continue O2 to maintain sats greater than 92%.  We will start Zosyn

IV and consult ID for further evaluation





1/8/2022.  1/8/2022.  Continue IV antibiotics of Zosyn and follow-up cultures.  

Continue O2 to maintain sats greater than 92%.  Speech therapy evaluation 

pending.  Patient with sepsis not present on admission as evident by 

leukocytosis, tachycardia, tachypnea and diagnosis of probable aspiration 

pneumonia.





1/9/2022.  Patient currently requiring 3 L O2 via nasal cannula.  Patient much 

more alert and oriented this morning.  Leukocytosis resolved.  Patient appears 

to be tolerating diet this morning but I informed the nurse to discontinue diet 

for now and have formal speech evaluation.  Keep patient n.p.o. until cleared by

speech.  Patient may be silently aspirating.





1/10/2022.  Patient currently requiring 3 L O2 via nasal cannula.  Continue IV 

antibiotics for probable aspiration pneumonia.  Await speech therapy evaluation.

 PT evaluation.





History


Interval history: 





No new issues overnight.





Hospitalist Physical





- Constitutional


Vitals: 


                                        











Temp Pulse Resp BP Pulse Ox


 


 98.3 F   78   20   129/80   100 


 


 01/10/22 08:54  01/10/22 08:54  01/10/22 08:54  01/10/22 08:54  01/10/22 08:54











General appearance: Present: no acute distress, well-nourished





- EENT


Eyes: Present: PERRL, EOM intact


ENT: hearing intact, clear oral mucosa, dentition normal





- Neck


Neck: Present: supple, normal ROM





- Respiratory


Respiratory effort: normal


Respiratory: bilateral: CTA





- Cardiovascular


Rhythm: regular


Heart Sounds: Present: S1 & S2.  Absent: gallop, rub





- Extremities


Extremities: no ischemia, No edema, Full ROM





- Abdominal


General gastrointestinal: soft, non-tender, non-distended, normal bowel sounds





- Integumentary


Integumentary: Present: clear, warm, dry





- Neurologic


Neurologic: CNII-XII intact, moves all extremities





Results





- Labs


CBC & Chem 7: 


                                 01/09/22 07:41





                                 01/09/22 13:11


Labs: 


                             Laboratory Last Values











WBC  8.9 K/mm3 (4.5-11.0)   01/09/22  07:41    


 


RBC  3.56 M/mm3 (3.65-5.03)  L  01/09/22  07:41    


 


Hgb  10.0 gm/dl (11.8-15.2)  L  01/09/22  07:41    


 


Hct  31.7 % (35.5-45.6)  L  01/09/22  07:41    


 


MCV  89 fl (84-94)   01/09/22  07:41    


 


MCH  28 pg (28-32)   01/09/22  07:41    


 


MCHC  32 % (32-34)   01/09/22  07:41    


 


RDW  20.7 % (13.2-15.2)  H  01/09/22  07:41    


 


Plt Count  222 K/mm3 (140-440)   01/09/22  07:41    


 


Lymph % (Auto)  Np   01/01/22  11:15    


 


Mono % (Auto)  Np   01/01/22  11:15    


 


Eos % (Auto)  Np   01/01/22  11:15    


 


Baso % (Auto)  Np   01/01/22  11:15    


 


Lymph # (Auto)  Np   01/01/22  11:15    


 


Mono # (Auto)  Np   01/01/22  11:15    


 


Eos # (Auto)  Np   01/01/22  11:15    


 


Baso # (Auto)  Np   01/01/22  11:15    


 


Add Manual Diff  Complete   01/01/22  11:15    


 


Total Counted  100   01/01/22  11:15    


 


Seg Neutrophils %  Np   01/01/22  11:15    


 


Seg Neuts % (Manual)  72.0 % (40.0-70.0)  H  01/01/22  11:15    


 


Lymphocytes % (Manual)  24.0 % (13.4-35.0)   01/01/22  11:15    


 


Monocytes % (Manual)  4.0 % (0.0-7.3)   01/01/22  11:15    


 


Nucleated RBC %  Not Reportable   01/01/22  11:15    


 


Seg Neutrophils #  Np   01/01/22  11:15    


 


Seg Neutrophils # Man  4.2 K/mm3 (1.8-7.7)   01/01/22  11:15    


 


Band Neutrophils #  0.0 K/mm3  01/01/22  11:15    


 


Lymphocytes # (Manual)  1.4 K/mm3 (1.2-5.4)   01/01/22  11:15    


 


Abs React Lymphs (Man)  0.0 K/mm3  01/01/22  11:15    


 


Monocytes # (Manual)  0.2 K/mm3 (0.0-0.8)   01/01/22  11:15    


 


Eosinophils # (Manual)  0.0 K/mm3 (0.0-0.4)   01/01/22  11:15    


 


Basophils # (Manual)  0.0 K/mm3 (0.0-0.1)   01/01/22  11:15    


 


Metamyelocytes #  0.0 K/mm3  01/01/22  11:15    


 


Myelocytes #  0.0 K/mm3  01/01/22  11:15    


 


Promyelocytes #  0.0 K/mm3  01/01/22  11:15    


 


Blast Cells #  0.0 K/mm3  01/01/22  11:15    


 


WBC Morphology  Not Reportable   01/01/22  11:15    


 


Hypersegmented Neuts  Not Reportable   01/01/22  11:15    


 


Hyposegmented Neuts  Not Reportable   01/01/22  11:15    


 


Hypogranular Neuts  Not Reportable   01/01/22  11:15    


 


Smudge Cells  Not Reportable   01/01/22  11:15    


 


Toxic Granulation  Not Reportable   01/01/22  11:15    


 


Toxic Vacuolation  Not Reportable   01/01/22  11:15    


 


Dohle Bodies  Not Reportable   01/01/22  11:15    


 


Pelger-Huet Anomaly  Not Reportable   01/01/22  11:15    


 


Shamir Rods  Not Reportable   01/01/22  11:15    


 


Platelet Estimate  Consistent w auto   01/01/22  11:15    


 


Clumped Platelets  Not Reportable   01/01/22  11:15    


 


Plt Clumps, EDTA  Not Reportable   01/01/22  11:15    


 


Large Platelets  Not Reportable   01/01/22  11:15    


 


Giant Platelets  Not Reportable   01/01/22  11:15    


 


Platelet Satelliting  Not Reportable   01/01/22  11:15    


 


Plt Morphology Comment  Not Reportable   01/01/22  11:15    


 


RBC Morphology  Not Reportable   01/01/22  11:15    


 


Dimorphic RBCs  Not Reportable   01/01/22  11:15    


 


Polychromasia  Not Reportable   01/01/22  11:15    


 


Hypochromasia  Not Reportable   01/01/22  11:15    


 


Poikilocytosis  Not Reportable   01/01/22  11:15    


 


Anisocytosis  Not Reportable   01/01/22  11:15    


 


Microcytosis  Not Reportable   01/01/22  11:15    


 


Macrocytosis  Not Reportable   01/01/22  11:15    


 


Spherocytes  Not Reportable   01/01/22  11:15    


 


Pappenheimer Bodies  Not Reportable   01/01/22  11:15    


 


Sickle Cells  Not Reportable   01/01/22  11:15    


 


Target Cells  Not Reportable   01/01/22  11:15    


 


Tear Drop Cells  Not Reportable   01/01/22  11:15    


 


Ovalocytes  Not Reportable   01/01/22  11:15    


 


Helmet Cells  Not Reportable   01/01/22  11:15    


 


[place] Bodies  Not Reportable   01/01/22  11:15    


 


Cabot Rings  Not Reportable   01/01/22  11:15    


 


[place] Cells  Not Reportable   01/01/22  11:15    


 


Bite Cells  Not Reportable   01/01/22  11:15    


 


Crenated Cell  Not Reportable   01/01/22  11:15    


 


Elliptocytes  Not Reportable   01/01/22  11:15    


 


Acanthocytes (Spur)  Not Reportable   01/01/22  11:15    


 


Rouleaux  Not Reportable   01/01/22  11:15    


 


Hemoglobin C Crystals  Not Reportable   01/01/22  11:15    


 


Schistocytes  Not Reportable   01/01/22  11:15    


 


Malaria parasites  Not Reportable   01/01/22  11:15    


 


Shaggy Bodies  Not Reportable   01/01/22  11:15    


 


Hem Pathologist Commnt  No   01/01/22  11:15    


 


PT  14.5 Sec. (12.2-14.9)   01/05/22  14:48    


 


INR  1.02  (0.87-1.13)   01/05/22  14:48    


 


APTT  33.1 Sec. (24.2-36.6)   01/05/22  14:48    


 


Heparin Anti-Xa Level  < 0.10 U.I./ml (0.3-0.7)  L  01/05/22  14:48    


 


ABG pH  7.431 pH Units (7.350-7.450)   01/06/22  12:30    


 


ABG pCO2  49.5 mm Hg  01/06/22  12:30    


 


ABG pO2  25.2 mm Hg (80.0-90.0)  L*  01/06/22  12:30    


 


ABG HCO3  32.2 mmol/L (20.0-26.0)  H  01/06/22  12:30    


 


ABG O2 Saturation  41.1 % (95.0-99.0)  L  01/06/22  12:30    


 


ABG O2 Content  9.4  (0.0-44)   01/06/22  12:30    


 


ABG Base Excess  6.6 mmol/L (-2.0-3.0)  H  01/06/22  12:30    


 


ABG Hemoglobin  10.2 gm/dl (14.0-18.0)  L  01/06/22  12:30    


 


ABG Carboxyhemoglobin  1.7 % (0.0-5.0)   01/06/22  12:30    


 


ABG Methemoglobin  0.8 % (0.0-1.5)   01/06/22  12:30    


 


Oxyhemoglobin  40.1 % (95.0-99.0)  L  01/06/22  12:30    


 


FiO2  28 %  01/06/22  12:30    


 


Sodium  144 mmol/L (137-145)  D 01/09/22  13:11    


 


Potassium  5.4 mmol/L (3.6-5.0)  H  01/09/22  13:11    


 


Chloride  103.9 mmol/L ()   01/09/22  13:11    


 


Carbon Dioxide  22 mmol/L (22-30)  D 01/09/22  13:11    


 


Anion Gap  24 mmol/L  01/09/22  13:11    


 


BUN  13 mg/dL (9-20)   01/09/22  13:11    


 


Creatinine  0.8 mg/dL (0.8-1.3)  D 01/09/22  13:11    


 


Estimated GFR  > 60 ml/min  01/09/22  13:11    


 


BUN/Creatinine Ratio  16 %  01/09/22  13:11    


 


Glucose  52 mg/dL ()  L  01/09/22  13:11    


 


POC Glucose  80 mg/dL ()   01/09/22  19:10    


 


Calcium  8.6 mg/dL (8.4-10.2)   01/09/22  13:11    


 


Magnesium  1.70 mg/dL (1.7-2.3)   01/05/22  14:48    


 


Total Bilirubin  1.10 mg/dL (0.1-1.2)   01/09/22  13:11    


 


AST  23 units/L (5-40)   01/09/22  13:11    


 


ALT  15 units/L (7-56)   01/09/22  13:11    


 


Alkaline Phosphatase  129 units/L ()   01/09/22  13:11    


 


NT-Pro-B Natriuret Pep  1300 pg/mL (0-900)  H  12/30/21  23:51    


 


Total Protein  5.7 g/dL (6.3-8.2)  L  01/09/22  13:11    


 


Albumin  2.5 g/dL (3.9-5)  L  01/09/22  13:11    


 


Albumin/Globulin Ratio  0.8 %  01/09/22  13:11    


 


TSH  2.930 mlU/mL (0.270-4.200)   01/03/22  00:06    


 


Total Cortisol  40.5 mcg/dL (***)   01/03/22  00:06    


 


Urine Color  Maren  (Yellow)   12/31/21  01:00    


 


Urine Turbidity  Slightly-cloudy  (Clear)   12/31/21  01:00    


 


Urine pH  6.0  (5.0-7.0)   12/31/21  01:00    


 


Ur Specific Gravity  1.012  (1.003-1.030)   12/31/21  01:00    


 


Urine Protein  30 mg/dl mg/dL (Negative)   12/31/21  01:00    


 


Urine Glucose (UA)  Neg mg/dL (Negative)   12/31/21  01:00    


 


Urine Ketones  Neg mg/dL (Negative)   12/31/21  01:00    


 


Urine Blood  Neg  (Negative)   12/31/21  01:00    


 


Urine Nitrite  Neg  (Negative)   12/31/21  01:00    


 


Urine Bilirubin  Neg  (Negative)   12/31/21  01:00    


 


Urine Urobilinogen  < 2.0 mg/dL (<2.0)   12/31/21  01:00    


 


Ur Leukocyte Esterase  Neg  (Negative)   12/31/21  01:00    


 


Urine WBC (Auto)  9.0 /HPF (0.0-6.0)  H  12/31/21  01:00    


 


Urine RBC (Auto)  3.0 /HPF (0.0-6.0)   12/31/21  01:00    


 


U Epithel Cells (Auto)  < 1.0 /HPF (0-13.0)   12/31/21  01:00    


 


Hyaline Casts  8 /LPF  12/31/21  01:00    


 


Urine Mucus  Few /HPF  12/31/21  01:00    











[place]/IV: 


                                        





Voiding Method                   Condom Catheter











Active Medications





- Current Medications


Current Medications: 














Generic Name Dose Route Start Last Admin





  Trade Name Freq  PRN Reason Stop Dose Admin


 


Acetaminophen  650 mg  12/31/21 03:51  01/02/22 22:07





  Acetaminophen 325 Mg Tab  PO   650 mg





  Q4H PRN   Administration





  Pain MILD(1-3)/Fever >100.5/HA  


 


Albuterol  2.5 mg  01/04/22 12:00 





  Albuterol 2.5 Mg/3 Ml Nebu  IH  





  Q4HRT PRN  





  Shortness Of Breath  


 


Albuterol/Ipratropium  1 ampul  01/04/22 14:00  01/10/22 09:46





  Ipratropium/Albuterol Sulfate 3 Ml Ampul.Neb  IH   1 ampul





  TIDRT SHAY   Administration


 


Apixaban  10 mg  01/05/22 10:00  01/10/22 09:34





  Apixaban 5 Mg Tab  PO  01/11/22 22:01  10 mg





  Q12HR SHAY   Administration





  Protocol  


 


Apixaban  5 mg  01/12/22 10:00 





  Apixaban 5 Mg Tab  PO  





  Q12HR SHAY  





  Protocol  


 


Famotidine  10 mg  12/31/21 10:00  01/10/22 09:33





  Famotidine 10 Mg Tab  PO   10 mg





  BID SHAY   Administration


 


Hydromorphone HCl  0.5 mg  12/31/21 03:51 





  Hydromorphone 1 Mg/1 Ml Inj  IV  





  Q3H PRN  





  Pain , Severe (7-10)  


 


Piperacillin Sod/Tazobactam Sod  4.5 gm in 100 mls @ 200 mls/hr  01/08/22 10:00 

01/10/22 09:39





  Zosyn/Ns 4.5gm/100ml  IV   200 mls/hr





  Q8H SHAY   Administration


 


Dextrose/Sodium Chloride  1,000 mls @ 75 mls/hr  01/09/22 20:00  01/09/22 21:45





  D5ns  IV   75 mls/hr





  AS DIRECT SHAY   Administration


 


Metoprolol Tartrate  12.5 mg  01/03/22 22:00  01/10/22 09:34





  Metoprolol Tartrate 25 Mg Tab  PO   12.5 mg





  BID SHAY   Administration


 


Morphine Sulfate  2 mg  12/31/21 03:51  01/07/22 14:59





  Morphine 2 Mg/1 Ml Inj  IV   2 mg





  Q4H PRN   Administration





  Pain, Moderate (4-6)  


 


Ondansetron HCl  4 mg  12/31/21 03:51 





  Ondansetron 4 Mg/2 Ml Inj  IV  





  Q8H PRN  





  Nausea And Vomiting  


 


Sodium Chloride  10 ml  12/31/21 10:00  01/10/22 09:34





  Sodium Chloride 0.9% 10 Ml Flush Syringe  IV   10 ml





  BID SHAY   Administration


 


Sodium Chloride  10 ml  12/31/21 03:51 





  Sodium Chloride 0.9% 10 Ml Flush Syringe  IV  





  PRN PRN  





  LINE FLUSH  


 


Spironolactone  25 mg  12/31/21 16:00  01/10/22 09:34





  Spironolactone 25 Mg Tab  PO   25 mg





  QDAY SHAY   Administration














Nutrition/Malnutrition Assess





- Dietary Evaluation


Nutrition/Malnutrition Findings: 


                                        





Nutrition Notes                                            Start:  12/31/21 

19:00


Freq:                                                      Status: Active       




Protocol:                                                                       




 Document     01/07/22 14:49  ZACHARY  (Rec: 01/07/22 15:01  ZACHARY  [id number])


 Nutrition Notes


     Initial or Follow up                        Brief Note


     Current Diet                                Cardiac Diet (since B 12/31),


                                                 D Suppl (since D 01/05).


     Height                                      5 ft 6 in


     Weight                                      78 kg


     Ideal Body Weight (kg)                      64.54


     BMI                                         27.7


     Weight change and time frame                No body weight change reported


                                                 .


     Weight Status                               Overweight


     Subjective/Other Information                RD consult for routine F/U on


                                                 dietary assessment.


                                                 No report available on PO


                                                 intake of meals at the time, I


                                                 spoke with RN over the phone


                                                 and told me that Pt is


                                                 lethargic and not eating or


                                                 drinking, while requiring more


                                                 oxigen.


                                                 I recommended advance to TF,


                                                 she will follow with MD.


                                                 Pt is not a candidate for


                                                 nutrition education at the


                                                 time.


     Percent of energy/protein needs met:        Prescribed Cardiac Diet


                                                 provides for energy/protein


                                                 needs (2,230 Kcal/85 g) during


                                                 LOS; additionally, Dietary


                                                 Supplements will support wound


                                                 healing processes, and


                                                 compensate for possible Poor


                                                 PO intake of meals with 1,465


                                                 Kcal and 62 g of protein.


     #1


      Nutrition Diagnosis                        Increased nutrient needs   (


                                                 specify in comment below)


      Diagnosis Progress(for reassessment        Continues


       documentation)                            


 Nutrition Intervention


     Follow-Up By:                               01/10/22


     Additional Comments                         Continue monitoring food


                                                 tolerance, %PO intake of meals


                                                 , and BM.


                                                 Possible advance to TF.
Assessment and Plan


Assessment and plan: 





76-year-old  male with history of hypertension CVA and right hip fracture who is

presenting with leg swelling.





Sepsis.  Not present on admission 





anasarca





Left DVT





NSVT





Acute hypoxic respiratory failure





Hypokalemia





CVA





Hypertension





Hyponatremia





Nephrotic syndrome





Hospital Course


12/31: Follow up echo. Will hold off on lasix at this time due to low K. Replete

K with KCL IV (ordered 8 bags). May need albumin/lasix per nephrology. Follow up

diagnostic studies and cardiology recommendations.





01/01/22: Mild diastolic dysfunction, LVEF 55-65% per Echo read. Continue 

diuresis with spironalactone. Strict I/O's. May consider albumin/lasix if 

diuresis not adequate. Will continue to follow nephrology and cardiology 

recommendations. Anticipate dc in next 24-48 hrs. 





01/02/22: administered albumin + lasix 40 IV x 1.





01/03/22: albumin lasix ordered. US of lower extremity demonstrates DVT in 

popliteal vein. Initiated on heparin gtt.





1/4/2022: Transition from heparin drip to oral anticoagulation if okay with 

nephrology.  Hypokalemia improved.  Nephrology following.  At this time 

urinalysis does not show any evidence of blood only 30 mg of protein.  Albumin 

however is 2.5 which is very concerning rule out other causes for malnutrition/?

 Liver disease, bilirubin noted to be elevated





1/5/2022: We will transition from heparin drip to Eliquis.  Nephrology to check 

magnesium and BMP.  Nurse reports patient has very poor p.o. intake.  Will 

consult dietitian for further evaluation.





1/5/2022:  Continue eliquis.  Nutritionist added supplements with Nepro and 

Arturo twice daily.  Acute kidney injury hyponatremia has resolved.  Chest x-ray 

shows no acute findings.  Anticipate discharge in a.m.





1/6/2022.  Patient's oxygen requirement has been increased to 15 L on Venturi 

mask with FiO2 of 50%.  We will check CTA of the chest to rule out PE especially

given left DVT and respiratory compromise.  Patient was on 2 L satting at 97% 

yesterday.  Pulmonary consultation





1/7/2022.  CT of the chest revealed right upper lobe and middle lobe pneumonia 

that may represent aspiration.  We will consult speech therapy for further 

evaluation.  Continue O2 to maintain sats greater than 92%.  We will start Zosyn

IV and consult ID for further evaluation





1/8/2022.  1/8/2022.  Continue IV antibiotics of Zosyn and follow-up cultures.  

Continue O2 to maintain sats greater than 92%.  Speech therapy evaluation 

pending.  Patient with sepsis not present on admission as evident by 

leukocytosis, tachycardia, tachypnea and diagnosis of probable aspiration 

pneumonia.





1/9/2022.  Patient currently requiring 3 L O2 via nasal cannula.  Patient much 

more alert and oriented this morning.  Leukocytosis resolved.  Patient appears 

to be tolerating diet this morning but I informed the nurse to discontinue diet 

for now and have formal speech evaluation.  Keep patient n.p.o. until cleared by

speech.  Patient may be silently aspirating.





History


Interval history: 





No new issues overnight.





Hospitalist Physical





- Constitutional


Vitals: 


                                        











Temp Pulse Resp BP Pulse Ox


 


 98.0 F   87   16   130/53   94 


 


 01/09/22 08:23  01/09/22 08:23  01/09/22 08:23  01/09/22 08:23  01/09/22 08:23











General appearance: Present: no acute distress, well-nourished





- EENT


Eyes: Present: PERRL, EOM intact


ENT: hearing intact, clear oral mucosa, dentition normal





- Neck


Neck: Present: supple, normal ROM





- Respiratory


Respiratory effort: normal


Respiratory: bilateral: CTA





- Cardiovascular


Rhythm: regular


Heart Sounds: Present: S1 & S2.  Absent: gallop, rub





- Extremities


Extremities: no ischemia, No edema, Full ROM





- Abdominal


General gastrointestinal: soft, non-tender, non-distended, normal bowel sounds





- Integumentary


Integumentary: Present: clear, warm, dry





- Neurologic


Neurologic: CNII-XII intact, moves all extremities





Results





- Labs


CBC & Chem 7: 


                                 01/09/22 07:41





                                 01/08/22 08:28


Labs: 


                             Laboratory Last Values











WBC  8.9 K/mm3 (4.5-11.0)   01/09/22  07:41    


 


RBC  3.56 M/mm3 (3.65-5.03)  L  01/09/22  07:41    


 


Hgb  10.0 gm/dl (11.8-15.2)  L  01/09/22  07:41    


 


Hct  31.7 % (35.5-45.6)  L  01/09/22  07:41    


 


MCV  89 fl (84-94)   01/09/22  07:41    


 


MCH  28 pg (28-32)   01/09/22  07:41    


 


MCHC  32 % (32-34)   01/09/22  07:41    


 


RDW  20.7 % (13.2-15.2)  H  01/09/22  07:41    


 


Plt Count  222 K/mm3 (140-440)   01/09/22  07:41    


 


Lymph % (Auto)  Np   01/01/22  11:15    


 


Mono % (Auto)  Np   01/01/22  11:15    


 


Eos % (Auto)  Np   01/01/22  11:15    


 


Baso % (Auto)  Np   01/01/22  11:15    


 


Lymph # (Auto)  Np   01/01/22  11:15    


 


Mono # (Auto)  Np   01/01/22  11:15    


 


Eos # (Auto)  Np   01/01/22  11:15    


 


Baso # (Auto)  Np   01/01/22  11:15    


 


Add Manual Diff  Complete   01/01/22  11:15    


 


Total Counted  100   01/01/22  11:15    


 


Seg Neutrophils %  Np   01/01/22  11:15    


 


Seg Neuts % (Manual)  72.0 % (40.0-70.0)  H  01/01/22  11:15    


 


Lymphocytes % (Manual)  24.0 % (13.4-35.0)   01/01/22  11:15    


 


Monocytes % (Manual)  4.0 % (0.0-7.3)   01/01/22  11:15    


 


Nucleated RBC %  Not Reportable   01/01/22  11:15    


 


Seg Neutrophils #  Np   01/01/22  11:15    


 


Seg Neutrophils # Man  4.2 K/mm3 (1.8-7.7)   01/01/22  11:15    


 


Band Neutrophils #  0.0 K/mm3  01/01/22  11:15    


 


Lymphocytes # (Manual)  1.4 K/mm3 (1.2-5.4)   01/01/22  11:15    


 


Abs React Lymphs (Man)  0.0 K/mm3  01/01/22  11:15    


 


Monocytes # (Manual)  0.2 K/mm3 (0.0-0.8)   01/01/22  11:15    


 


Eosinophils # (Manual)  0.0 K/mm3 (0.0-0.4)   01/01/22  11:15    


 


Basophils # (Manual)  0.0 K/mm3 (0.0-0.1)   01/01/22  11:15    


 


Metamyelocytes #  0.0 K/mm3  01/01/22  11:15    


 


Myelocytes #  0.0 K/mm3  01/01/22  11:15    


 


Promyelocytes #  0.0 K/mm3  01/01/22  11:15    


 


Blast Cells #  0.0 K/mm3  01/01/22  11:15    


 


WBC Morphology  Not Reportable   01/01/22  11:15    


 


Hypersegmented Neuts  Not Reportable   01/01/22  11:15    


 


Hyposegmented Neuts  Not Reportable   01/01/22  11:15    


 


Hypogranular Neuts  Not Reportable   01/01/22  11:15    


 


Smudge Cells  Not Reportable   01/01/22  11:15    


 


Toxic Granulation  Not Reportable   01/01/22  11:15    


 


Toxic Vacuolation  Not Reportable   01/01/22  11:15    


 


Dohle Bodies  Not Reportable   01/01/22  11:15    


 


Pelger-Huet Anomaly  Not Reportable   01/01/22  11:15    


 


Shamir Rods  Not Reportable   01/01/22  11:15    


 


Platelet Estimate  Consistent w auto   01/01/22  11:15    


 


Clumped Platelets  Not Reportable   01/01/22  11:15    


 


Plt Clumps, EDTA  Not Reportable   01/01/22  11:15    


 


Large Platelets  Not Reportable   01/01/22  11:15    


 


Giant Platelets  Not Reportable   01/01/22  11:15    


 


Platelet Satelliting  Not Reportable   01/01/22  11:15    


 


Plt Morphology Comment  Not Reportable   01/01/22  11:15    


 


RBC Morphology  Not Reportable   01/01/22  11:15    


 


Dimorphic RBCs  Not Reportable   01/01/22  11:15    


 


Polychromasia  Not Reportable   01/01/22  11:15    


 


Hypochromasia  Not Reportable   01/01/22  11:15    


 


Poikilocytosis  Not Reportable   01/01/22  11:15    


 


Anisocytosis  Not Reportable   01/01/22  11:15    


 


Microcytosis  Not Reportable   01/01/22  11:15    


 


Macrocytosis  Not Reportable   01/01/22  11:15    


 


Spherocytes  Not Reportable   01/01/22  11:15    


 


Pappenheimer Bodies  Not Reportable   01/01/22  11:15    


 


Sickle Cells  Not Reportable   01/01/22  11:15    


 


Target Cells  Not Reportable   01/01/22  11:15    


 


Tear Drop Cells  Not Reportable   01/01/22  11:15    


 


Ovalocytes  Not Reportable   01/01/22  11:15    


 


Helmet Cells  Not Reportable   01/01/22  11:15    


 


Cuevas-[place] Bodies  Not Reportable   01/01/22  11:15    


 


Cabot Rings  Not Reportable   01/01/22  11:15    


 


Sepideh Cells  Not Reportable   01/01/22  11:15    


 


Bite Cells  Not Reportable   01/01/22  11:15    


 


Crenated Cell  Not Reportable   01/01/22  11:15    


 


Elliptocytes  Not Reportable   01/01/22  11:15    


 


Acanthocytes (Spur)  Not Reportable   01/01/22  11:15    


 


Rouleaux  Not Reportable   01/01/22  11:15    


 


Hemoglobin C Crystals  Not Reportable   01/01/22  11:15    


 


Schistocytes  Not Reportable   01/01/22  11:15    


 


Malaria parasites  Not Reportable   01/01/22  11:15    


 


Shaggy Bodies  Not Reportable   01/01/22  11:15    


 


Hem Pathologist Commnt  No   01/01/22  11:15    


 


PT  14.5 Sec. (12.2-14.9)   01/05/22  14:48    


 


INR  1.02  (0.87-1.13)   01/05/22  14:48    


 


APTT  33.1 Sec. (24.2-36.6)   01/05/22  14:48    


 


Heparin Anti-Xa Level  < 0.10 U.I./ml (0.3-0.7)  L  01/05/22  14:48    


 


ABG pH  7.431 pH Units (7.350-7.450)   01/06/22  12:30    


 


ABG pCO2  49.5 mm Hg  01/06/22  12:30    


 


ABG pO2  25.2 mm Hg (80.0-90.0)  L*  01/06/22  12:30    


 


ABG HCO3  32.2 mmol/L (20.0-26.0)  H  01/06/22  12:30    


 


ABG O2 Saturation  41.1 % (95.0-99.0)  L  01/06/22  12:30    


 


ABG O2 Content  9.4  (0.0-44)   01/06/22  12:30    


 


ABG Base Excess  6.6 mmol/L (-2.0-3.0)  H  01/06/22  12:30    


 


ABG Hemoglobin  10.2 gm/dl (14.0-18.0)  L  01/06/22  12:30    


 


ABG Carboxyhemoglobin  1.7 % (0.0-5.0)   01/06/22  12:30    


 


ABG Methemoglobin  0.8 % (0.0-1.5)   01/06/22  12:30    


 


Oxyhemoglobin  40.1 % (95.0-99.0)  L  01/06/22  12:30    


 


FiO2  28 %  01/06/22  12:30    


 


Sodium  136 mmol/L (137-145)  L  01/05/22  14:48    


 


Potassium  4.9 mmol/L (3.6-5.0)  D 01/05/22  14:48    


 


Chloride  97.2 mmol/L ()  L  01/05/22  14:48    


 


Carbon Dioxide  29 mmol/L (22-30)   01/05/22  14:48    


 


Anion Gap  15 mmol/L  01/05/22  14:48    


 


BUN  9 mg/dL (9-20)   01/05/22  14:48    


 


Creatinine  0.5 mg/dL (0.8-1.3)  L  01/08/22  08:28    


 


Estimated GFR  > 60 ml/min  01/08/22  08:28    


 


BUN/Creatinine Ratio  15 %  01/05/22  14:48    


 


Glucose  96 mg/dL ()   01/05/22  14:48    


 


POC Glucose  95 mg/dL ()   01/03/22  07:38    


 


Calcium  8.5 mg/dL (8.4-10.2)   01/05/22  14:48    


 


Magnesium  1.70 mg/dL (1.7-2.3)   01/05/22  14:48    


 


Total Bilirubin  1.40 mg/dL (0.1-1.2)  H  12/30/21  23:51    


 


AST  28 units/L (5-40)   12/30/21  23:51    


 


ALT  17 units/L (7-56)   12/30/21  23:51    


 


Alkaline Phosphatase  141 units/L ()  H  12/30/21  23:51    


 


NT-Pro-B Natriuret Pep  1300 pg/mL (0-900)  H  12/30/21  23:51    


 


Total Protein  5.9 g/dL (6.3-8.2)  L  12/30/21  23:51    


 


Albumin  2.5 g/dL (3.9-5)  L  12/30/21  23:51    


 


Albumin/Globulin Ratio  0.7 %  12/30/21  23:51    


 


TSH  2.930 mlU/mL (0.270-4.200)   01/03/22  00:06    


 


Total Cortisol  40.5 mcg/dL (***)   01/03/22  00:06    


 


Urine Color  Maren  (Yellow)   12/31/21  01:00    


 


Urine Turbidity  Slightly-cloudy  (Clear)   12/31/21  01:00    


 


Urine pH  6.0  (5.0-7.0)   12/31/21  01:00    


 


Ur Specific Gravity  1.012  (1.003-1.030)   12/31/21  01:00    


 


Urine Protein  30 mg/dl mg/dL (Negative)   12/31/21  01:00    


 


Urine Glucose (UA)  Neg mg/dL (Negative)   12/31/21  01:00    


 


Urine Ketones  Neg mg/dL (Negative)   12/31/21  01:00    


 


Urine Blood  Neg  (Negative)   12/31/21  01:00    


 


Urine Nitrite  Neg  (Negative)   12/31/21  01:00    


 


Urine Bilirubin  Neg  (Negative)   12/31/21  01:00    


 


Urine Urobilinogen  < 2.0 mg/dL (<2.0)   12/31/21  01:00    


 


Ur Leukocyte Esterase  Neg  (Negative)   12/31/21  01:00    


 


Urine WBC (Auto)  9.0 /HPF (0.0-6.0)  H  12/31/21  01:00    


 


Urine RBC (Auto)  3.0 /HPF (0.0-6.0)   12/31/21  01:00    


 


U Epithel Cells (Auto)  < 1.0 /HPF (0-13.0)   12/31/21  01:00    


 


Hyaline Casts  8 /LPF  12/31/21  01:00    


 


Urine Mucus  Few /HPF  12/31/21  01:00    











Acosta/IV: 


                                        





Voiding Method                   Condom Catheter











Active Medications





- Current Medications


Current Medications: 














Generic Name Dose Route Start Last Admin





  Trade Name Freq  PRN Reason Stop Dose Admin


 


Acetaminophen  650 mg  12/31/21 03:51  01/02/22 22:07





  Acetaminophen 325 Mg Tab  PO   650 mg





  Q4H PRN   Administration





  Pain MILD(1-3)/Fever >100.5/HA  


 


Albuterol  2.5 mg  01/04/22 12:00 





  Albuterol 2.5 Mg/3 Ml Nebu  IH  





  Q4HRT PRN  





  Shortness Of Breath  


 


Albuterol/Ipratropium  1 ampul  01/04/22 14:00  01/08/22 15:09





  Ipratropium/Albuterol Sulfate 3 Ml Ampul.Neb  IH   1 ampul





  TIDRT SHAY   Administration


 


Apixaban  10 mg  01/05/22 10:00  01/08/22 21:35





  Apixaban 5 Mg Tab  PO  01/11/22 22:01  10 mg





  Q12HR SHAY   Administration





  Protocol  


 


Apixaban  5 mg  01/12/22 10:00 





  Apixaban 5 Mg Tab  PO  





  Q12HR SHAY  





  Protocol  


 


Famotidine  10 mg  12/31/21 10:00  01/08/22 21:36





  Famotidine 10 Mg Tab  PO   10 mg





  BID SHAY   Administration


 


Hydromorphone HCl  0.5 mg  12/31/21 03:51 





  Hydromorphone 1 Mg/1 Ml Inj  IV  





  Q3H PRN  





  Pain , Severe (7-10)  


 


Piperacillin Sod/Tazobactam Sod  4.5 gm in 100 mls @ 200 mls/hr  01/08/22 10:00 

01/09/22 02:33





  Zosyn/Ns 4.5gm/100ml  IV   200 mls/hr





  Q8H SHAY   Administration


 


Metoprolol Tartrate  12.5 mg  01/03/22 22:00  01/08/22 21:36





  Metoprolol Tartrate 25 Mg Tab  PO   12.5 mg





  BID SHAY   Administration


 


Morphine Sulfate  2 mg  12/31/21 03:51  01/07/22 14:59





  Morphine 2 Mg/1 Ml Inj  IV   2 mg





  Q4H PRN   Administration





  Pain, Moderate (4-6)  


 


Ondansetron HCl  4 mg  12/31/21 03:51 





  Ondansetron 4 Mg/2 Ml Inj  IV  





  Q8H PRN  





  Nausea And Vomiting  


 


Sodium Chloride  10 ml  12/31/21 10:00  01/08/22 21:36





  Sodium Chloride 0.9% 10 Ml Flush Syringe  IV   10 ml





  BID SHAY   Administration


 


Sodium Chloride  10 ml  12/31/21 03:51 





  Sodium Chloride 0.9% 10 Ml Flush Syringe  IV  





  PRN PRN  





  LINE FLUSH  


 


Spironolactone  25 mg  12/31/21 16:00  01/08/22 10:40





  Spironolactone 25 Mg Tab  PO   25 mg





  QDAY SHAY   Administration














Nutrition/Malnutrition Assess





- Dietary Evaluation


Nutrition/Malnutrition Findings: 


                                        





Nutrition Notes                                            Start:  12/31/21 

19:00


Freq:                                                      Status: Active       




Protocol:                                                                       




 Document     01/07/22 14:49  ZACHARY  (Rec: 01/07/22 15:01  ZACHARY  [id number])


 Nutrition Notes


     Initial or Follow up                        Brief Note


     Current Diet                                Cardiac Diet (since B 12/31),


                                                 D Suppl (since D 01/05).


     Height                                      5 ft 6 in


     Weight                                      78 kg


     Ideal Body Weight (kg)                      64.54


     BMI                                         27.7


     Weight change and time frame                No body weight change reported


                                                 .


     Weight Status                               Overweight


     Subjective/Other Information                RD consult for routine F/U on


                                                 dietary assessment.


                                                 No report available on PO


                                                 intake of meals at the time, I


                                                 spoke with RN over the phone


                                                 and told me that Pt is


                                                 lethargic and not eating or


                                                 drinking, while requiring more


                                                 oxigen.


                                                 I recommended advance to TF,


                                                 she will follow with MD.


                                                 Pt is not a candidate for


                                                 nutrition education at the


                                                 time.


     Percent of energy/protein needs met:        Prescribed Cardiac Diet


                                                 provides for energy/protein


                                                 needs (2,230 Kcal/85 g) during


                                                 LOS; additionally, Dietary


                                                 Supplements will support wound


                                                 healing processes, and


                                                 compensate for possible Poor


                                                 PO intake of meals with 1,465


                                                 Kcal and 62 g of protein.


     #1


      Nutrition Diagnosis                        Increased nutrient needs   (


                                                 specify in comment below)


      Diagnosis Progress(for reassessment        Continues


       documentation)                            


 Nutrition Intervention


     Follow-Up By:                               01/10/22


     Additional Comments                         Continue monitoring food


                                                 tolerance, %PO intake of meals


                                                 , and BM.


                                                 Possible advance to TF.
Assessment and Plan


Assessment and plan: 





History of present illness: 





76-year-old  male with history of hypertension CVA and right hip fracture who is

presenting with leg swelling.





Hospital Course


12/31: Follow up echo. Will hold off on lasix at this time due to low K. Replete

K with KCL IV (ordered 8 bags). May need albumin/lasix per nephrology. Follow up

diagnostic studies and cardiology recommendations.





01/01/22: Mild diastolic dysfunction, LVEF 55-65% per Echo read. Continue 

diuresis with spironalactone. Strict I/O's. May consider albumin/lasix if 

diuresis not adequate. Will continue to follow nephrology and cardiology 

recommendations. Anticipate dc in next 24-48 hrs. 





01/02/22: administered albumin + lasix 40 IV x 1.





01/03/22: albumin lasix ordered. US of lower extremity demonstrates DVT in 

popliteal vein. Initiated on heparin gtt.





Assessment and Plan


(1) Anasarca


Current Visit: Yes   Status: Acute   


Plan to address problem: 


Admit the patient to the medical floor.


Oxygen per nasal cannula 3 to per minute.  


DuoNeb by nebulizer every 4 hours.  


spironalactone 25 mg po daily started


 Echocardiogram: EF preserved, cardiology doubts cardiogenic source of edema


Nephrology following:





(2) NSVT


Current Visit: Yes   Status: Acute   


Plan to address problem: 


noted on telemetry


Cardiology recommends low dose BB.





(3) Acute Respiratory Failure with Hypoxia


Current Visit: Yes   Status: Acute   


Plan to address problem: 


Secondary to fluid overload


on 3l NC


Lasix as above





(4) DVT


Current Visit: Yes   Status: Acute   


Plan to address problem: 


doppler ultrasound demonstrates DVT in left popliteal vein.


initiated on heparin gtt.





(4) Hypokalemia (improving)


Current Visit: Yes   Status: Acute   


Plan to address problem: 


Potassium is supplemented.  KCl 40 mEq p.o. every 4 hours x2 dose.  Recheck BMP 

in the morning


KCl iv


Trend on serial bmp.








(3) CVA (cerebral vascular accident)


Current Visit: Yes   Status: Acute   


Plan to address problem: 


Stable.  Continue home medication.  Outpatient follow-up with neurology








(4) Hypertension


Current Visit: Yes   Status: Acute   


Plan to address problem: 


Hydralazine 10 mg IV every 6 hours as needed.  We will continue the home 

medication.  We will monitor the patient closely








(5) Hyponatremia


Current Visit: Yes   Status: Acute   


Plan to address problem: 


We will monitor the sodium closely.  Reconsult nephrology for further evaluation

.  Recheck BMP in the morning








(6) Nephrotic syndrome


Current Visit: Yes   Status: Acute   


Plan to address problem: 


Clinical picture could be secondary to nephrotic syndrome.  


Will consult nephrology for evaluation.  


Recheck BMP in the morning








(7) DVT prophylaxis


Current Visit: Yes   Status: Acute   


Plan to address problem: 


Heparin 5000 units subcu every 12 hours for DVT prophylaxis.  Pepcid 20 mg p.o. 

twice daily for GI prophylaxis.  Patient is a full code





History


Interval history: 





No overnight events. Patient had adequate urinary output response to lasix 

/albumin yesterday per patient.





Hospitalist Physical





- Physical exam


Narrative exam: 





Physical Exam:


VITAL SIGNS:  Reviewed.    


GENERAL:  The patient appears normally developed, Vital signs as documented.


HEAD:  No signs of head trauma.


EYES:  Pupils are equal.  Extraocular motions intact.  


EARS:  Hearing grossly intact.


MOUTH:  Oropharynx is normal. 


NECK:  No adenopathy, no JVD.  


CHEST:  Chest with clear breath sounds bilaterally.  No wheezes, rales, or 

rhonchi.  


CARDIAC:  Regular rate and rhythm.  S1 and S2, without murmurs, gallops, or 

rubs.


VASCULAR:  Diffuse bl lower ext edema. Peripheral pulses normal and equal in all

extremities.


ABDOMEN:  Soft, non tender and non distended.  No   rebound or guarding, and no 

masses palpated.   Bowel Sounds normal.


MUSCULOSKELETAL:  Good range of motion of all major joints. Extremities without 

clubbing, cyanosis or edema.  


NEUROLOGIC EXAM:  Alert and oriented x 4. no focal sensory or strength deficits.

 


PSYCHIATRIC:  Mood normal.


SKIN:  detail exam as documented in skin assessment





- Constitutional


Vitals: 


                                        











Temp Pulse Resp BP Pulse Ox


 


 97.6 F   91 H  18   102/61   96 


 


 01/03/22 04:47  01/03/22 04:47  01/03/22 04:47  01/03/22 04:47  01/03/22 04:47











General appearance: Present: no acute distress, well-nourished





Results





- Labs


CBC & Chem 7: 


                                 01/01/22 11:15





                                 01/03/22 00:06


Labs: 


                             Laboratory Last Values











WBC  5.8 K/mm3 (4.5-11.0)   01/01/22  11:15    


 


RBC  4.05 M/mm3 (3.65-5.03)   01/01/22  11:15    


 


Hgb  11.2 gm/dl (11.8-15.2)  L  01/01/22  11:15    


 


Hct  35.4 % (35.5-45.6)  L  01/01/22  11:15    


 


MCV  88 fl (84-94)   01/01/22  11:15    


 


MCH  28 pg (28-32)   01/01/22  11:15    


 


MCHC  32 % (32-34)   01/01/22  11:15    


 


RDW  20.6 % (13.2-15.2)  H  01/01/22  11:15    


 


Plt Count  231 K/mm3 (140-440)   01/01/22  11:15    


 


Lymph % (Auto)  Np   01/01/22  11:15    


 


Mono % (Auto)  Np   01/01/22  11:15    


 


Eos % (Auto)  Np   01/01/22  11:15    


 


Baso % (Auto)  Np   01/01/22  11:15    


 


Lymph # (Auto)  Np   01/01/22  11:15    


 


Mono # (Auto)  Np   01/01/22  11:15    


 


Eos # (Auto)  Np   01/01/22  11:15    


 


Baso # (Auto)  Np   01/01/22  11:15    


 


Add Manual Diff  Complete   01/01/22  11:15    


 


Total Counted  100   01/01/22  11:15    


 


Seg Neutrophils %  Np   01/01/22  11:15    


 


Seg Neuts % (Manual)  72.0 % (40.0-70.0)  H  01/01/22  11:15    


 


Lymphocytes % (Manual)  24.0 % (13.4-35.0)   01/01/22  11:15    


 


Monocytes % (Manual)  4.0 % (0.0-7.3)   01/01/22  11:15    


 


Nucleated RBC %  Not Reportable   01/01/22  11:15    


 


Seg Neutrophils #  Np   01/01/22  11:15    


 


Seg Neutrophils # Man  4.2 K/mm3 (1.8-7.7)   01/01/22  11:15    


 


Band Neutrophils #  0.0 K/mm3  01/01/22  11:15    


 


Lymphocytes # (Manual)  1.4 K/mm3 (1.2-5.4)   01/01/22  11:15    


 


Abs React Lymphs (Man)  0.0 K/mm3  01/01/22  11:15    


 


Monocytes # (Manual)  0.2 K/mm3 (0.0-0.8)   01/01/22  11:15    


 


Eosinophils # (Manual)  0.0 K/mm3 (0.0-0.4)   01/01/22  11:15    


 


Basophils # (Manual)  0.0 K/mm3 (0.0-0.1)   01/01/22  11:15    


 


Metamyelocytes #  0.0 K/mm3  01/01/22  11:15    


 


Myelocytes #  0.0 K/mm3  01/01/22  11:15    


 


Promyelocytes #  0.0 K/mm3  01/01/22  11:15    


 


Blast Cells #  0.0 K/mm3  01/01/22  11:15    


 


WBC Morphology  Not Reportable   01/01/22  11:15    


 


Hypersegmented Neuts  Not Reportable   01/01/22  11:15    


 


Hyposegmented Neuts  Not Reportable   01/01/22  11:15    


 


Hypogranular Neuts  Not Reportable   01/01/22  11:15    


 


Smudge Cells  Not Reportable   01/01/22  11:15    


 


Toxic Granulation  Not Reportable   01/01/22  11:15    


 


Toxic Vacuolation  Not Reportable   01/01/22  11:15    


 


Dohle Bodies  Not Reportable   01/01/22  11:15    


 


Pelger-Huet Anomaly  Not Reportable   01/01/22  11:15    


 


Shamir Rods  Not Reportable   01/01/22  11:15    


 


Platelet Estimate  Consistent w auto   01/01/22  11:15    


 


Clumped Platelets  Not Reportable   01/01/22  11:15    


 


Plt Clumps, EDTA  Not Reportable   01/01/22  11:15    


 


Large Platelets  Not Reportable   01/01/22  11:15    


 


Giant Platelets  Not Reportable   01/01/22  11:15    


 


Platelet Satelliting  Not Reportable   01/01/22  11:15    


 


Plt Morphology Comment  Not Reportable   01/01/22  11:15    


 


RBC Morphology  Not Reportable   01/01/22  11:15    


 


Dimorphic RBCs  Not Reportable   01/01/22  11:15    


 


Polychromasia  Not Reportable   01/01/22  11:15    


 


Hypochromasia  Not Reportable   01/01/22  11:15    


 


Poikilocytosis  Not Reportable   01/01/22  11:15    


 


Anisocytosis  Not Reportable   01/01/22  11:15    


 


Microcytosis  Not Reportable   01/01/22  11:15    


 


Macrocytosis  Not Reportable   01/01/22  11:15    


 


Spherocytes  Not Reportable   01/01/22  11:15    


 


Pappenheimer Bodies  Not Reportable   01/01/22  11:15    


 


Sickle Cells  Not Reportable   01/01/22  11:15    


 


Target Cells  Not Reportable   01/01/22  11:15    


 


Tear Drop Cells  Not Reportable   01/01/22  11:15    


 


Ovalocytes  Not Reportable   01/01/22  11:15    


 


Helmet Cells  Not Reportable   01/01/22  11:15    


 


Cuevas-Portis Bodies  Not Reportable   01/01/22  11:15    


 


Cabot Rings  Not Reportable   01/01/22  11:15    


 


[place] Cells  Not Reportable   01/01/22  11:15    


 


Bite Cells  Not Reportable   01/01/22  11:15    


 


Crenated Cell  Not Reportable   01/01/22  11:15    


 


Elliptocytes  Not Reportable   01/01/22  11:15    


 


Acanthocytes (Spur)  Not Reportable   01/01/22  11:15    


 


Rouleaux  Not Reportable   01/01/22  11:15    


 


Hemoglobin C Crystals  Not Reportable   01/01/22  11:15    


 


Schistocytes  Not Reportable   01/01/22  11:15    


 


Malaria parasites  Not Reportable   01/01/22  11:15    


 


Shaggy Bodies  Not Reportable   01/01/22  11:15    


 


Hem Pathologist Commnt  No   01/01/22  11:15    


 


Sodium  132 mmol/L (137-145)  L  01/03/22  00:06    


 


Potassium  3.4 mmol/L (3.6-5.0)  L  01/03/22  00:06    


 


Chloride  95.1 mmol/L ()  L  01/03/22  00:06    


 


Carbon Dioxide  21 mmol/L (22-30)  L  01/03/22  00:06    


 


Anion Gap  19 mmol/L  01/03/22  00:06    


 


BUN  13 mg/dL (9-20)   01/03/22  00:06    


 


Creatinine  0.8 mg/dL (0.8-1.3)   01/03/22  00:06    


 


Estimated GFR  > 60 ml/min  01/03/22  00:06    


 


BUN/Creatinine Ratio  16 %  01/03/22  00:06    


 


Glucose  109 mg/dL ()  H  01/03/22  00:06    


 


POC Glucose  95 mg/dL ()   01/03/22  07:38    


 


Calcium  8.1 mg/dL (8.4-10.2)  L  01/03/22  00:06    


 


Magnesium  1.90 mg/dL (1.7-2.3)   01/01/22  11:15    


 


Total Bilirubin  1.40 mg/dL (0.1-1.2)  H  12/30/21  23:51    


 


AST  28 units/L (5-40)   12/30/21  23:51    


 


ALT  17 units/L (7-56)   12/30/21  23:51    


 


Alkaline Phosphatase  141 units/L ()  H  12/30/21  23:51    


 


NT-Pro-B Natriuret Pep  1300 pg/mL (0-900)  H  12/30/21  23:51    


 


Total Protein  5.9 g/dL (6.3-8.2)  L  12/30/21  23:51    


 


Albumin  2.5 g/dL (3.9-5)  L  12/30/21  23:51    


 


Albumin/Globulin Ratio  0.7 %  12/30/21  23:51    


 


TSH  2.930 mlU/mL (0.270-4.200)   01/03/22  00:06    


 


Urine Color  Maren  (Yellow)   12/31/21  01:00    


 


Urine Turbidity  Slightly-cloudy  (Clear)   12/31/21  01:00    


 


Urine pH  6.0  (5.0-7.0)   12/31/21  01:00    


 


Ur Specific Gravity  1.012  (1.003-1.030)   12/31/21  01:00    


 


Urine Protein  30 mg/dl mg/dL (Negative)   12/31/21  01:00    


 


Urine Glucose (UA)  Neg mg/dL (Negative)   12/31/21  01:00    


 


Urine Ketones  Neg mg/dL (Negative)   12/31/21  01:00    


 


Urine Blood  Neg  (Negative)   12/31/21  01:00    


 


Urine Nitrite  Neg  (Negative)   12/31/21  01:00    


 


Urine Bilirubin  Neg  (Negative)   12/31/21  01:00    


 


Urine Urobilinogen  < 2.0 mg/dL (<2.0)   12/31/21  01:00    


 


Ur Leukocyte Esterase  Neg  (Negative)   12/31/21  01:00    


 


Urine WBC (Auto)  9.0 /HPF (0.0-6.0)  H  12/31/21  01:00    


 


Urine RBC (Auto)  3.0 /HPF (0.0-6.0)   12/31/21  01:00    


 


U Epithel Cells (Auto)  < 1.0 /HPF (0-13.0)   12/31/21  01:00    


 


Hyaline Casts  8 /LPF  12/31/21  01:00    


 


Urine Mucus  Few /HPF  12/31/21  01:00    











Microbiology: 


Microbiology





12/31/21 01:00   Urine,Clean Catch   Urine Culture - Preliminary


                            NO GROWTH AFTER 24 HOURS








Acosta/IV: 


                                        





Voiding Method                   Condom Catheter











Active Medications





- Current Medications


Current Medications: 














Generic Name Dose Route Start Last Admin





  Trade Name Freq  PRN Reason Stop Dose Admin


 


Acetaminophen  650 mg  12/31/21 03:51  01/02/22 22:07





  Acetaminophen 325 Mg Tab  PO   650 mg





  Q4H PRN   Administration





  Pain MILD(1-3)/Fever >100.5/HA  


 


Albuterol  2.5 mg  12/31/21 03:51 





  Albuterol 2.5 Mg/3 Ml Nebu  IH  





  Q4HRT PRN  





  Shortness Of Breath  


 


Albuterol/Ipratropium  1 ampul  12/31/21 08:00  01/02/22 22:48





  Ipratropium/Albuterol Sulfate 3 Ml Ampul.Neb  IH   1 ampul





  Q6HRT SHAY   Administration


 


Famotidine  10 mg  12/31/21 10:00  01/02/22 22:05





  Famotidine 10 Mg Tab  PO   10 mg





  BID SHAY   Administration


 


Heparin Sodium (Porcine)  5,000 unit  12/31/21 10:00  01/02/22 22:05





  Heparin 5,000 Unit/1 Ml Vial  SUB-Q   5,000 unit





  Q12HR SHAY   Administration


 


Hydromorphone HCl  0.5 mg  12/31/21 03:51 





  Hydromorphone 1 Mg/1 Ml Inj  IV  





  Q3H PRN  





  Pain , Severe (7-10)  


 


Morphine Sulfate  2 mg  12/31/21 03:51  01/03/22 04:51





  Morphine 2 Mg/1 Ml Inj  IV   2 mg





  Q4H PRN   Administration





  Pain, Moderate (4-6)  


 


Ondansetron HCl  4 mg  12/31/21 03:51 





  Ondansetron 4 Mg/2 Ml Inj  IV  





  Q8H PRN  





  Nausea And Vomiting  


 


Potassium Chloride  40 meq  01/01/22 17:00  01/02/22 13:09





  Potassium Chloride Er 20 Meq Tab  PO   40 meq





  QDAY SHAY   Administration


 


Sodium Chloride  10 ml  12/31/21 10:00  01/02/22 22:06





  Sodium Chloride 0.9% 10 Ml Flush Syringe  IV   10 ml





  BID SHAY   Administration


 


Sodium Chloride  10 ml  12/31/21 03:51 





  Sodium Chloride 0.9% 10 Ml Flush Syringe  IV  





  PRN PRN  





  LINE FLUSH  


 


Spironolactone  25 mg  12/31/21 16:00  01/02/22 13:09





  Spironolactone 25 Mg Tab  PO   25 mg





  QDAY SHAY   Administration














Nutrition/Malnutrition Assess





- Dietary Evaluation


Nutrition/Malnutrition Findings: 


                                        





Nutrition Notes                                            Start:  12/31/21 

19:00


Freq:                                                      Status: Active       




Protocol:                                                                       




 Document     12/31/21 19:00  ZACHARY  (Rec: 12/31/21 19:10  ZACHARY  [id number])


 Nutrition Notes


     Need for Assessment generated from:         MD Order,Education


     Initial or Follow up                        Brief Note


     Current Diagnosis                           Hypertension


     Other Pertinent Diagnosis                   Nephrotic syndrome, Anasarca,


                                                 CVA, Hypokalemia, Hyponatremia


                                                 .


     Current Diet                                Cardiac Diet (since B 12/31).


     Height                                      5 ft 6 in


     Weight                                      81.647 kg


     Ideal Body Weight (kg)                      64.54


     BMI                                         29.0


     Weight change and time frame                None reported at admission.


     Weight Status                               Overweight


     Subjective/Other Information                RD consult for nutrition


                                                 education request.


                                                 Pt is not a candidate for


                                                 nutrition education at the


                                                 time. If feasible, nutrition


                                                 education will be provided at


                                                 F/U.


     Percent of energy/protein needs met:        Prescribed Cardiac Diet


                                                 provides for energy/protein


                                                 needs (2,230 Kcal/85 g) during


                                                 LOS.


 Nutrition Intervention


     Change Diet Order:                          Continue Cardiac Diet.


     Follow-Up By:                               01/07/22


     Additional Comments                         Continue monitoring food


                                                 tolerance, %PO intake of meals


                                                 , and BM.
Assessment and Plan


Assessment and plan: 





History of present illness: 





76-year-old  male with history of hypertension CVA and right hip fracture who is

presenting with leg swelling.





Hospital Course


12/31: Follow up echo. Will hold off on lasix at this time due to low K. Replete

K with KCL IV (ordered 8 bags). May need albumin/lasix per nephrology. Follow up

diagnostic studies and cardiology recommendations.





01/01/22: Mild diastolic dysfunction, LVEF 55-65% per Echo read. Continue 

diuresis with spironalactone. Strict I/O's. May consider albumin/lasix if 

diuresis not adequate. Will continue to follow nephrology and cardiology 

recommendations. Anticipate dc in next 24-48 hrs. 





Assessment and Plan


(1) Anasarca


Current Visit: Yes   Status: Acute   


Plan to address problem: 


Admit the patient to the medical floor.


Oxygen per nasal cannula 3 to per minute.  


DuoNeb by nebulizer every 4 hours.  


dc Lasix 40 mg IV daily. 


spironalactone 25 mg po daily started


 Echocardiogram.  


Cardiology and nephrology evaluation





(1) Acute Respiratory Failure with Hypoxia


Current Visit: Yes   Status: Acute   


Plan to address problem: 


Secondary to fluid overload


on 3l NC


Lasix as above





(2) Hypokalemia


Current Visit: Yes   Status: Acute   


Plan to address problem: 


Potassium is supplemented.  KCl 40 mEq p.o. every 4 hours x2 dose.  Recheck BMP 

in the morning


KCl iv


Trend on serial bmp.





(3) CVA (cerebral vascular accident)


Current Visit: Yes   Status: Acute   


Plan to address problem: 


Stable.  Continue home medication.  Outpatient follow-up with neurology








(4) Hypertension


Current Visit: Yes   Status: Acute   


Plan to address problem: 


Hydralazine 10 mg IV every 6 hours as needed.  We will continue the home 

medication.  We will monitor the patient closely








(5) Hyponatremia


Current Visit: Yes   Status: Acute   


Plan to address problem: 


We will monitor the sodium closely.  Reconsult nephrology for further 

evaluation.  Recheck BMP in the morning








(6) Nephrotic syndrome


Current Visit: Yes   Status: Acute   


Plan to address problem: 


Clinical picture could be secondary to nephrotic syndrome.  


Will consult nephrology for evaluation.  


Recheck BMP in the morning








(7) DVT prophylaxis


Current Visit: Yes   Status: Acute   


Plan to address problem: 


Heparin 5000 units subcu every 12 hours for DVT prophylaxis.  Pepcid 20 mg p.o. 

twice daily for GI prophylaxis.  Patient is a full code





History


Interval history: 





No acute complaints this morning. patient states he is urinating frequently. 

endorses some improvement in lower extremity edema.





Hospitalist Physical





- Physical exam


Narrative exam: 





Physical Exam:


VITAL SIGNS:  Reviewed.    


GENERAL:  The patient appears normally developed, Vital signs as documented.


HEAD:  No signs of head trauma.


EYES:  Pupils are equal.  Extraocular motions intact.  


EARS:  Hearing grossly intact.


MOUTH:  Oropharynx is normal. 


NECK:  No adenopathy, no JVD.  


CHEST:  Chest with clear breath sounds bilaterally.  No wheezes, rales, or 

rhonchi.  


CARDIAC:  Regular rate and rhythm.  S1 and S2, without murmurs, gallops, or 

rubs.


VASCULAR:  Diffuse bl lower ext edema. Peripheral pulses normal and equal in all

extremities.


ABDOMEN:  Soft, non tender and non distended.  No   rebound or guarding, and no 

masses palpated.   Bowel Sounds normal.


MUSCULOSKELETAL:  Good range of motion of all major joints. Extremities without 

clubbing, cyanosis or edema.  


NEUROLOGIC EXAM:  Alert and oriented x 4. no focal sensory or strength deficits.

 


PSYCHIATRIC:  Mood normal.


SKIN:  detail exam as documented in skin assessment





- Constitutional


Vitals: 


                                        











Temp Pulse Resp BP Pulse Ox


 


 97.5 F L  85   18   116/70   99 


 


 01/01/22 03:48  01/01/22 03:48  01/01/22 03:48  01/01/22 03:48  01/01/22 03:48











General appearance: Present: no acute distress, well-nourished





Results





- Labs


CBC & Chem 7: 


                                 01/01/22 11:15





                                 01/01/22 11:15


Labs: 


                             Laboratory Last Values











WBC  10.6 K/mm3 (4.5-11.0)   12/30/21  23:51    


 


RBC  4.19 M/mm3 (3.65-5.03)   12/30/21  23:51    


 


Hgb  11.4 gm/dl (11.8-15.2)  L  12/30/21  23:51    


 


Hct  35.3 % (35.5-45.6)  L  12/30/21  23:51    


 


MCV  84 fl (84-94)   12/30/21  23:51    


 


MCH  27 pg (28-32)  L  12/30/21  23:51    


 


MCHC  32 % (32-34)   12/30/21  23:51    


 


RDW  19.9 % (13.2-15.2)  H  12/30/21  23:51    


 


Plt Count  264 K/mm3 (140-440)   12/30/21  23:51    


 


Lymph % (Auto)  11.4 % (13.4-35.0)  L  12/30/21  23:51    


 


Mono % (Auto)  13.3 % (0.0-7.3)  H  12/30/21  23:51    


 


Eos % (Auto)  0.0 % (0.0-4.3)   12/30/21  23:51    


 


Baso % (Auto)  0.1 % (0.0-1.8)   12/30/21  23:51    


 


Lymph # (Auto)  1.2 K/mm3 (1.2-5.4)   12/30/21  23:51    


 


Mono # (Auto)  1.4 K/mm3 (0.0-0.8)  H  12/30/21  23:51    


 


Eos # (Auto)  0.0 K/mm3 (0.0-0.4)   12/30/21  23:51    


 


Baso # (Auto)  0.0 K/mm3 (0.0-0.1)   12/30/21  23:51    


 


Seg Neutrophils %  75.2 % (40.0-70.0)  H  12/30/21  23:51    


 


Seg Neutrophils #  8.0 K/mm3 (1.8-7.7)  H  12/30/21  23:51    


 


Sodium  127 mmol/L (137-145)  L  12/30/21  23:51    


 


Potassium  2.0 mmol/L (3.6-5.0)  L*  12/30/21  23:51    


 


Chloride  86.1 mmol/L ()  L  12/30/21  23:51    


 


Carbon Dioxide  25 mmol/L (22-30)   12/30/21  23:51    


 


Anion Gap  18 mmol/L  12/30/21  23:51    


 


BUN  15 mg/dL (9-20)   12/30/21  23:51    


 


Creatinine  1.3 mg/dL (0.8-1.3)   12/30/21  23:51    


 


Estimated GFR  > 60 ml/min  12/30/21  23:51    


 


BUN/Creatinine Ratio  12 %  12/30/21  23:51    


 


Glucose  124 mg/dL ()  H  12/30/21  23:51    


 


Calcium  8.1 mg/dL (8.4-10.2)  L  12/30/21  23:51    


 


Total Bilirubin  1.40 mg/dL (0.1-1.2)  H  12/30/21  23:51    


 


AST  28 units/L (5-40)   12/30/21  23:51    


 


ALT  17 units/L (7-56)   12/30/21  23:51    


 


Alkaline Phosphatase  141 units/L ()  H  12/30/21  23:51    


 


NT-Pro-B Natriuret Pep  1300 pg/mL (0-900)  H  12/30/21  23:51    


 


Total Protein  5.9 g/dL (6.3-8.2)  L  12/30/21  23:51    


 


Albumin  2.5 g/dL (3.9-5)  L  12/30/21  23:51    


 


Albumin/Globulin Ratio  0.7 %  12/30/21  23:51    


 


Urine Color  Maren  (Yellow)   12/31/21  01:00    


 


Urine Turbidity  Slightly-cloudy  (Clear)   12/31/21  01:00    


 


Urine pH  6.0  (5.0-7.0)   12/31/21  01:00    


 


Ur Specific Gravity  1.012  (1.003-1.030)   12/31/21  01:00    


 


Urine Protein  30 mg/dl mg/dL (Negative)   12/31/21  01:00    


 


Urine Glucose (UA)  Neg mg/dL (Negative)   12/31/21  01:00    


 


Urine Ketones  Neg mg/dL (Negative)   12/31/21  01:00    


 


Urine Blood  Neg  (Negative)   12/31/21  01:00    


 


Urine Nitrite  Neg  (Negative)   12/31/21  01:00    


 


Urine Bilirubin  Neg  (Negative)   12/31/21  01:00    


 


Urine Urobilinogen  < 2.0 mg/dL (<2.0)   12/31/21  01:00    


 


Ur Leukocyte Esterase  Neg  (Negative)   12/31/21  01:00    


 


Urine WBC (Auto)  9.0 /HPF (0.0-6.0)  H  12/31/21  01:00    


 


Urine RBC (Auto)  3.0 /HPF (0.0-6.0)   12/31/21  01:00    


 


U Epithel Cells (Auto)  < 1.0 /HPF (0-13.0)   12/31/21  01:00    


 


Hyaline Casts  8 /LPF  12/31/21  01:00    


 


Urine Mucus  Few /HPF  12/31/21  01:00    











Ac[place]/IV: 


                                        





Voiding Method                   Condom Catheter











Active Medications





- Current Medications


Current Medications: 














Generic Name Dose Route Start Last Admin





  Trade Name Freq  PRN Reason Stop Dose Admin


 


Acetaminophen  650 mg  12/31/21 03:51  12/31/21 16:09





  Acetaminophen 325 Mg Tab  PO   650 mg





  Q4H PRN   Administration





  Pain MILD(1-3)/Fever >100.5/HA  


 


Albuterol  2.5 mg  12/31/21 03:51 





  Albuterol 2.5 Mg/3 Ml Nebu  IH  





  Q4HRT PRN  





  Shortness Of Breath  


 


Albuterol/Ipratropium  1 ampul  12/31/21 08:00  01/01/22 03:07





  Ipratropium/Albuterol Sulfate 3 Ml Ampul.Neb  IH   Not Given





  Q6HRT SHAY  


 


Famotidine  10 mg  12/31/21 10:00  12/31/21 22:34





  Famotidine 10 Mg Tab  PO   10 mg





  BID SHAY   Administration


 


Heparin Sodium (Porcine)  5,000 unit  12/31/21 10:00  12/31/21 22:33





  Heparin 5,000 Unit/1 Ml Vial  SUB-Q   5,000 unit





  Q12HR SHAY   Administration


 


Hydromorphone HCl  0.5 mg  12/31/21 03:51 





  Hydromorphone 1 Mg/1 Ml Inj  IV  





  Q3H PRN  





  Pain , Severe (7-10)  


 


Morphine Sulfate  2 mg  12/31/21 03:51 





  Morphine 2 Mg/1 Ml Inj  IV  





  Q4H PRN  





  Pain, Moderate (4-6)  


 


Ondansetron HCl  4 mg  12/31/21 03:51 





  Ondansetron 4 Mg/2 Ml Inj  IV  





  Q8H PRN  





  Nausea And Vomiting  


 


Sodium Chloride  10 ml  12/31/21 10:00  12/31/21 22:34





  Sodium Chloride 0.9% 10 Ml Flush Syringe  IV   10 ml





  BID SHAY   Administration


 


Sodium Chloride  10 ml  12/31/21 03:51 





  Sodium Chloride 0.9% 10 Ml Flush Syringe  IV  





  PRN PRN  





  LINE FLUSH  


 


Spironolactone  25 mg  12/31/21 16:00  12/31/21 17:36





  Spironolactone 25 Mg Tab  PO   25 mg





  QDAY SHAY   Administration














Nutrition/Malnutrition Assess





- Dietary Evaluation


Nutrition/Malnutrition Findings: 


                                        





Nutrition Notes                                            Start:  12/31/21 

19:00


Freq:                                                      Status: Active       




Protocol:                                                                       




 Document     12/31/21 19:00  ZACHARY  (Rec: 12/31/21 19:10  ZACHARY  [id number])


 Nutrition Notes


     Need for Assessment generated from:         MD Order,Education


     Initial or Follow up                        Brief Note


     Current Diagnosis                           Hypertension


     Other Pertinent Diagnosis                   Nephrotic syndrome, Anasarca,


                                                 CVA, Hypokalemia, Hyponatremia


                                                 .


     Current Diet                                Cardiac Diet (since B 12/31).


     Height                                      5 ft 6 in


     Weight                                      81.647 kg


     Ideal Body Weight (kg)                      64.54


     BMI                                         29.0


     Weight change and time frame                None reported at admission.


     Weight Status                               Overweight


     Subjective/Other Information                RD consult for nutrition


                                                 education request.


                                                 Pt is not a candidate for


                                                 nutrition education at the


                                                 time. If feasible, nutrition


                                                 education will be provided at


                                                 F/U.


     Percent of energy/protein needs met:        Prescribed Cardiac Diet


                                                 provides for energy/protein


                                                 needs (2,230 Kcal/85 g) during


                                                 LOS.


 Nutrition Intervention


     Change Diet Order:                          Continue Cardiac Diet.


     Follow-Up By:                               01/07/22


     Additional Comments                         Continue monitoring food


                                                 tolerance, %PO intake of meals


                                                 , and BM.
Assessment and Plan


Assessment and plan: 





History of present illness: 





76-year-old  male with history of hypertension CVA and right hip fracture who is

presenting with leg swelling.





Hospital Course


12/31: Follow up echo. Will hold off on lasix at this time due to low K. Replete

K with KCL IV (ordered 8 bags). May need albumin/lasix per nephrology. Follow up

diagnostic studies and cardiology recommendations.





01/01/22: Mild diastolic dysfunction, LVEF 55-65% per Echo read. Continue 

diuresis with spironalactone. Strict I/O's. May consider albumin/lasix if 

diuresis not adequate. Will continue to follow nephrology and cardiology 

recommendations. Anticipate dc in next 24-48 hrs. 





Assessment and Plan


(1) Anasarca


Current Visit: Yes   Status: Acute   


Plan to address problem: 


Admit the patient to the medical floor.


Oxygen per nasal cannula 3 to per minute.  


DuoNeb by nebulizer every 4 hours.  


dc Lasix 40 mg IV daily. 


spironalactone 25 mg po daily started


 Echocardiogram.  


Cardiology and nephrology evaluation





(1) Acute Respiratory Failure with Hypoxia


Current Visit: Yes   Status: Acute   


Plan to address problem: 


Secondary to fluid overload


on 3l NC


Lasix as above





(2) Hypokalemia


Current Visit: Yes   Status: Acute   


Plan to address problem: 


Potassium is supplemented.  KCl 40 mEq p.o. every 4 hours x2 dose.  Recheck BMP 

in the morning


KCl iv


Trend on serial bmp.





(3) CVA (cerebral vascular accident)


Current Visit: Yes   Status: Acute   


Plan to address problem: 


Stable.  Continue home medication.  Outpatient follow-up with neurology








(4) Hypertension


Current Visit: Yes   Status: Acute   


Plan to address problem: 


Hydralazine 10 mg IV every 6 hours as needed.  We will continue the home 

medication.  We will monitor the patient closely








(5) Hyponatremia


Current Visit: Yes   Status: Acute   


Plan to address problem: 


We will monitor the sodium closely.  Reconsult nephrology for further 

evaluation.  Recheck BMP in the morning








(6) Nephrotic syndrome


Current Visit: Yes   Status: Acute   


Plan to address problem: 


Clinical picture could be secondary to nephrotic syndrome.  


Will consult nephrology for evaluation.  


Recheck BMP in the morning








(7) DVT prophylaxis


Current Visit: Yes   Status: Acute   


Plan to address problem: 


Heparin 5000 units subcu every 12 hours for DVT prophylaxis.  Pepcid 20 mg p.o. 

twice daily for GI prophylaxis.  Patient is a full code





History


Interval history: 





No acute complaints this morning. patient states he is urinating frequently. 

endorses some improvement in lower extremity edema.





Hospitalist Physical





- Physical exam


Narrative exam: 





Physical Exam:


VITAL SIGNS:  Reviewed.    


GENERAL:  The patient appears normally developed, Vital signs as documented.


HEAD:  No signs of head trauma.


EYES:  Pupils are equal.  Extraocular motions intact.  


EARS:  Hearing grossly intact.


MOUTH:  Oropharynx is normal. 


NECK:  No adenopathy, no JVD.  


CHEST:  Chest with clear breath sounds bilaterally.  No wheezes, rales, or 

rhonchi.  


CARDIAC:  Regular rate and rhythm.  S1 and S2, without murmurs, gallops, or 

rubs.


VASCULAR:  Diffuse bl lower ext edema. Peripheral pulses normal and equal in all

extremities.


ABDOMEN:  Soft, non tender and non distended.  No   rebound or guarding, and no 

masses palpated.   Bowel Sounds normal.


MUSCULOSKELETAL:  Good range of motion of all major joints. Extremities without 

clubbing, cyanosis or edema.  


NEUROLOGIC EXAM:  Alert and oriented x 4. no focal sensory or strength deficits.

 


PSYCHIATRIC:  Mood normal.


SKIN:  detail exam as documented in skin assessment





- Constitutional


Vitals: 


                                        











Temp Pulse Resp BP Pulse Ox


 


 97.8 F   80   18   111/63   87 


 


 01/02/22 03:11  01/02/22 03:11  01/02/22 03:11  01/02/22 03:11  01/02/22 03:11











General appearance: Present: no acute distress, well-nourished





Results





- Labs


CBC & Chem 7: 


                                 01/01/22 11:15





                                 01/03/22 00:06


Labs: 


                             Laboratory Last Values











WBC  5.8 K/mm3 (4.5-11.0)   01/01/22  11:15    


 


RBC  4.05 M/mm3 (3.65-5.03)   01/01/22  11:15    


 


Hgb  11.2 gm/dl (11.8-15.2)  L  01/01/22  11:15    


 


Hct  35.4 % (35.5-45.6)  L  01/01/22  11:15    


 


MCV  88 fl (84-94)   01/01/22  11:15    


 


MCH  28 pg (28-32)   01/01/22  11:15    


 


MCHC  32 % (32-34)   01/01/22  11:15    


 


RDW  20.6 % (13.2-15.2)  H  01/01/22  11:15    


 


Plt Count  231 K/mm3 (140-440)   01/01/22  11:15    


 


Lymph % (Auto)  Np   01/01/22  11:15    


 


Mono % (Auto)  Np   01/01/22  11:15    


 


Eos % (Auto)  Np   01/01/22  11:15    


 


Baso % (Auto)  Np   01/01/22  11:15    


 


Lymph # (Auto)  Np   01/01/22  11:15    


 


Mono # (Auto)  Np   01/01/22  11:15    


 


Eos # (Auto)  Np   01/01/22  11:15    


 


Baso # (Auto)  Np   01/01/22  11:15    


 


Add Manual Diff  Complete   01/01/22  11:15    


 


Total Counted  100   01/01/22  11:15    


 


Seg Neutrophils %  Np   01/01/22  11:15    


 


Seg Neuts % (Manual)  72.0 % (40.0-70.0)  H  01/01/22  11:15    


 


Lymphocytes % (Manual)  24.0 % (13.4-35.0)   01/01/22  11:15    


 


Monocytes % (Manual)  4.0 % (0.0-7.3)   01/01/22  11:15    


 


Nucleated RBC %  Not Reportable   01/01/22  11:15    


 


Seg Neutrophils #  Np   01/01/22  11:15    


 


Seg Neutrophils # Man  4.2 K/mm3 (1.8-7.7)   01/01/22  11:15    


 


Band Neutrophils #  0.0 K/mm3  01/01/22  11:15    


 


Lymphocytes # (Manual)  1.4 K/mm3 (1.2-5.4)   01/01/22  11:15    


 


Abs React Lymphs (Man)  0.0 K/mm3  01/01/22  11:15    


 


Monocytes # (Manual)  0.2 K/mm3 (0.0-0.8)   01/01/22  11:15    


 


Eosinophils # (Manual)  0.0 K/mm3 (0.0-0.4)   01/01/22  11:15    


 


Basophils # (Manual)  0.0 K/mm3 (0.0-0.1)   01/01/22  11:15    


 


Metamyelocytes #  0.0 K/mm3  01/01/22  11:15    


 


Myelocytes #  0.0 K/mm3  01/01/22  11:15    


 


Promyelocytes #  0.0 K/mm3  01/01/22  11:15    


 


Blast Cells #  0.0 K/mm3  01/01/22  11:15    


 


WBC Morphology  Not Reportable   01/01/22  11:15    


 


Hypersegmented Neuts  Not Reportable   01/01/22  11:15    


 


Hyposegmented Neuts  Not Reportable   01/01/22  11:15    


 


Hypogranular Neuts  Not Reportable   01/01/22  11:15    


 


Smudge Cells  Not Reportable   01/01/22  11:15    


 


Toxic Granulation  Not Reportable   01/01/22  11:15    


 


Toxic Vacuolation  Not Reportable   01/01/22  11:15    


 


Dohle Bodies  Not Reportable   01/01/22  11:15    


 


Pelger-Huet Anomaly  Not Reportable   01/01/22  11:15    


 


Shamir Rods  Not Reportable   01/01/22  11:15    


 


Platelet Estimate  Consistent w auto   01/01/22  11:15    


 


Clumped Platelets  Not Reportable   01/01/22  11:15    


 


Plt Clumps, EDTA  Not Reportable   01/01/22  11:15    


 


Large Platelets  Not Reportable   01/01/22  11:15    


 


Giant Platelets  Not Reportable   01/01/22  11:15    


 


Platelet Satelliting  Not Reportable   01/01/22  11:15    


 


Plt Morphology Comment  Not Reportable   01/01/22  11:15    


 


RBC Morphology  Not Reportable   01/01/22  11:15    


 


Dimorphic RBCs  Not Reportable   01/01/22  11:15    


 


Polychromasia  Not Reportable   01/01/22  11:15    


 


Hypochromasia  Not Reportable   01/01/22  11:15    


 


Poikilocytosis  Not Reportable   01/01/22  11:15    


 


Anisocytosis  Not Reportable   01/01/22  11:15    


 


Microcytosis  Not Reportable   01/01/22  11:15    


 


Macrocytosis  Not Reportable   01/01/22  11:15    


 


Spherocytes  Not Reportable   01/01/22  11:15    


 


Pappenheimer Bodies  Not Reportable   01/01/22  11:15    


 


Sickle Cells  Not Reportable   01/01/22  11:15    


 


Target Cells  Not Reportable   01/01/22  11:15    


 


Tear Drop Cells  Not Reportable   01/01/22  11:15    


 


Ovalocytes  Not Reportable   01/01/22  11:15    


 


Helmet Cells  Not Reportable   01/01/22  11:15    


 


[place] Bodies  Not Reportable   01/01/22  11:15    


 


Cabot Rings  Not Reportable   01/01/22  11:15    


 


Sepideh Cells  Not Reportable   01/01/22  11:15    


 


Bite Cells  Not Reportable   01/01/22  11:15    


 


Crenated Cell  Not Reportable   01/01/22  11:15    


 


Elliptocytes  Not Reportable   01/01/22  11:15    


 


Acanthocytes (Spur)  Not Reportable   01/01/22  11:15    


 


Rouleaux  Not Reportable   01/01/22  11:15    


 


Hemoglobin C Crystals  Not Reportable   01/01/22  11:15    


 


Schistocytes  Not Reportable   01/01/22  11:15    


 


Malaria parasites  Not Reportable   01/01/22  11:15    


 


Shaggy Bodies  Not Reportable   01/01/22  11:15    


 


Hem Pathologist Commnt  No   01/01/22  11:15    


 


Sodium  129 mmol/L (137-145)  L  01/01/22  11:15    


 


Potassium  3.3 mmol/L (3.6-5.0)  L D 01/01/22  11:15    


 


Chloride  93.5 mmol/L ()  L  01/01/22  11:15    


 


Carbon Dioxide  22 mmol/L (22-30)   01/01/22  11:15    


 


Anion Gap  17 mmol/L  01/01/22  11:15    


 


BUN  15 mg/dL (9-20)   01/01/22  11:15    


 


Creatinine  1.0 mg/dL (0.8-1.3)   01/01/22  11:15    


 


Estimated GFR  > 60 ml/min  01/01/22  11:15    


 


BUN/Creatinine Ratio  15 %  01/01/22  11:15    


 


Glucose  81 mg/dL ()   01/01/22  11:15    


 


POC Glucose  73 mg/dL ()   01/01/22  15:39    


 


Calcium  7.9 mg/dL (8.4-10.2)  L  01/01/22  11:15    


 


Magnesium  1.90 mg/dL (1.7-2.3)   01/01/22  11:15    


 


Total Bilirubin  1.40 mg/dL (0.1-1.2)  H  12/30/21  23:51    


 


AST  28 units/L (5-40)   12/30/21  23:51    


 


ALT  17 units/L (7-56)   12/30/21  23:51    


 


Alkaline Phosphatase  141 units/L ()  H  12/30/21  23:51    


 


NT-Pro-B Natriuret Pep  1300 pg/mL (0-900)  H  12/30/21  23:51    


 


Total Protein  5.9 g/dL (6.3-8.2)  L  12/30/21  23:51    


 


Albumin  2.5 g/dL (3.9-5)  L  12/30/21  23:51    


 


Albumin/Globulin Ratio  0.7 %  12/30/21  23:51    


 


Urine Color  Maren  (Yellow)   12/31/21  01:00    


 


Urine Turbidity  Slightly-cloudy  (Clear)   12/31/21  01:00    


 


Urine pH  6.0  (5.0-7.0)   12/31/21  01:00    


 


Ur Specific Gravity  1.012  (1.003-1.030)   12/31/21  01:00    


 


Urine Protein  30 mg/dl mg/dL (Negative)   12/31/21  01:00    


 


Urine Glucose (UA)  Neg mg/dL (Negative)   12/31/21  01:00    


 


Urine Ketones  Neg mg/dL (Negative)   12/31/21  01:00    


 


Urine Blood  Neg  (Negative)   12/31/21  01:00    


 


Urine Nitrite  Neg  (Negative)   12/31/21  01:00    


 


Urine Bilirubin  Neg  (Negative)   12/31/21  01:00    


 


Urine Urobilinogen  < 2.0 mg/dL (<2.0)   12/31/21  01:00    


 


Ur Leukocyte Esterase  Neg  (Negative)   12/31/21  01:00    


 


Urine WBC (Auto)  9.0 /HPF (0.0-6.0)  H  12/31/21  01:00    


 


Urine RBC (Auto)  3.0 /HPF (0.0-6.0)   12/31/21  01:00    


 


U Epithel Cells (Auto)  < 1.0 /HPF (0-13.0)   12/31/21  01:00    


 


Hyaline Casts  8 /LPF  12/31/21  01:00    


 


Urine Mucus  Few /HPF  12/31/21  01:00    











[place]/IV: 


                                        





Voiding Method                   Condom Catheter











Active Medications





- Current Medications


Current Medications: 














Generic Name Dose Route Start Last Admin





  Trade Name Freq  PRN Reason Stop Dose Admin


 


Acetaminophen  650 mg  12/31/21 03:51  12/31/21 16:09





  Acetaminophen 325 Mg Tab  PO   650 mg





  Q4H PRN   Administration





  Pain MILD(1-3)/Fever >100.5/HA  


 


Albuterol  2.5 mg  12/31/21 03:51 





  Albuterol 2.5 Mg/3 Ml Nebu  IH  





  Q4HRT PRN  





  Shortness Of Breath  


 


Albuterol/Ipratropium  1 ampul  12/31/21 08:00  01/01/22 23:16





  Ipratropium/Albuterol Sulfate 3 Ml Ampul.Neb  IH   Not Given





  Q6HRT SHAY  


 


Famotidine  10 mg  12/31/21 10:00  01/01/22 22:11





  Famotidine 10 Mg Tab  PO   10 mg





  BID SHAY   Administration


 


Heparin Sodium (Porcine)  5,000 unit  12/31/21 10:00  01/01/22 22:12





  Heparin 5,000 Unit/1 Ml Vial  SUB-Q   5,000 unit





  Q12HR SHAY   Administration


 


Hydromorphone HCl  0.5 mg  12/31/21 03:51 





  Hydromorphone 1 Mg/1 Ml Inj  IV  





  Q3H PRN  





  Pain , Severe (7-10)  


 


Morphine Sulfate  2 mg  12/31/21 03:51 





  Morphine 2 Mg/1 Ml Inj  IV  





  Q4H PRN  





  Pain, Moderate (4-6)  


 


Ondansetron HCl  4 mg  12/31/21 03:51 





  Ondansetron 4 Mg/2 Ml Inj  IV  





  Q8H PRN  





  Nausea And Vomiting  


 


Potassium Chloride  40 meq  01/01/22 17:00  01/01/22 22:11





  Potassium Chloride Er 20 Meq Tab  PO   40 meq





  QDAY SHAY   Administration


 


Sodium Chloride  10 ml  12/31/21 10:00  01/01/22 22:15





  Sodium Chloride 0.9% 10 Ml Flush Syringe  IV   10 ml





  BID SHAY   Administration


 


Sodium Chloride  10 ml  12/31/21 03:51 





  Sodium Chloride 0.9% 10 Ml Flush Syringe  IV  





  PRN PRN  





  LINE FLUSH  


 


Spironolactone  25 mg  12/31/21 16:00  01/01/22 10:02





  Spironolactone 25 Mg Tab  PO   25 mg





  QDAY SHAY   Administration














Nutrition/Malnutrition Assess





- Dietary Evaluation


Nutrition/Malnutrition Findings: 


                                        





Nutrition Notes                                            Start:  12/31/21 

19:00


Freq:                                                      Status: Active       




Protocol:                                                                       




 Document     12/31/21 19:00  ZACHARY  (Rec: 12/31/21 19:10  ZACHARY  [id number])


 Nutrition Notes


     Need for Assessment generated from:         MD Order,Education


     Initial or Follow up                        Brief Note


     Current Diagnosis                           Hypertension


     Other Pertinent Diagnosis                   Nephrotic syndrome, Anasarca,


                                                 CVA, Hypokalemia, Hyponatremia


                                                 .


     Current Diet                                Cardiac Diet (since B 12/31).


     Height                                      5 ft 6 in


     Weight                                      81.647 kg


     Ideal Body Weight (kg)                      64.54


     BMI                                         29.0


     Weight change and time frame                None reported at admission.


     Weight Status                               Overweight


     Subjective/Other Information                RD consult for nutrition


                                                 education request.


                                                 Pt is not a candidate for


                                                 nutrition education at the


                                                 time. If feasible, nutrition


                                                 education will be provided at


                                                 F/U.


     Percent of energy/protein needs met:        Prescribed Cardiac Diet


                                                 provides for energy/protein


                                                 needs (2,230 Kcal/85 g) during


                                                 LOS.


 Nutrition Intervention


     Change Diet Order:                          Continue Cardiac Diet.


     Follow-Up By:                               01/07/22


     Additional Comments                         Continue monitoring food


                                                 tolerance, %PO intake of meals


                                                 , and BM.
Assessment and Plan


Assessment and plan: 





History of present illness: 





76-year-old  male with history of hypertension CVA and right hip fracture who is

presenting with leg swelling.





Hospital Course


12/31: Follow up echo. Will hold off on lasix at this time due to low K. Replete

K with KCL IV (ordered 8 bags). May need albumin/lasix per nephrology. Follow up

diagnostic studies and cardiology recommendations.





Assessment and Plan


(1) Anasarca


Current Visit: Yes   Status: Acute   


Plan to address problem: 


Admit the patient to the medical floor.


Oxygen per nasal cannula 3 to per minute.  


DuoNeb by nebulizer every 4 hours.  


dc Lasix 40 mg IV daily. 


spironalactone 25 mg po daily started


 Echocardiogram.  


Cardiology and nephrology evaluation





(1) Acute Respiratory Failure with Hypoxia


Current Visit: Yes   Status: Acute   


Plan to address problem: 


Secondary to fluid overload


on 3l NC


Lasix as above





(2) Hypokalemia


Current Visit: Yes   Status: Acute   


Plan to address problem: 


Potassium is supplemented.  KCl 40 mEq p.o. every 4 hours x2 dose.  Recheck BMP 

in the morning


KCl iv


Trend on serial bmp.





(3) CVA (cerebral vascular accident)


Current Visit: Yes   Status: Acute   


Plan to address problem: 


Stable.  Continue home medication.  Outpatient follow-up with neurology








(4) Hypertension


Current Visit: Yes   Status: Acute   


Plan to address problem: 


Hydralazine 10 mg IV every 6 hours as needed.  We will continue the home 

medication.  We will monitor the patient closely








(5) Hyponatremia


Current Visit: Yes   Status: Acute   


Plan to address problem: 


We will monitor the sodium closely.  Reconsult nephrology for further 

evaluation.  Recheck BMP in the morning








(6) Nephrotic syndrome


Current Visit: Yes   Status: Acute   


Plan to address problem: 


Clinical picture could be secondary to nephrotic syndrome.  


Will consult nephrology for evaluation.  


Recheck BMP in the morning








(7) DVT prophylaxis


Current Visit: Yes   Status: Acute   


Plan to address problem: 


Heparin 5000 units subcu every 12 hours for DVT prophylaxis.  Pepcid 20 mg p.o. 

twice daily for GI prophylaxis.  Patient is a full code





History


Interval history: 





NO acute complaints this AM. Denied shortness of breath or chest pain but still 

has persisting edema. 





Hospitalist Physical





- Physical exam


Narrative exam: 





Physical Exam:


VITAL SIGNS:  Reviewed.    


GENERAL:  The patient appears normally developed, Vital signs as documented.


HEAD:  No signs of head trauma.


EYES:  Pupils are equal.  Extraocular motions intact.  


EARS:  Hearing grossly intact.


MOUTH:  Oropharynx is normal. 


NECK:  No adenopathy, no JVD.  


CHEST:  Chest with clear breath sounds bilaterally.  No wheezes, rales, or 

rhonchi.  


CARDIAC:  Regular rate and rhythm.  S1 and S2, without murmurs, gallops, or 

rubs.


VASCULAR:  Diffuse bl lower ext edema. Peripheral pulses normal and equal in all

extremities.


ABDOMEN:  Soft, non tender and non distended.  No   rebound or guarding, and no 

masses palpated.   Bowel Sounds normal.


MUSCULOSKELETAL:  Good range of motion of all major joints. Extremities without 

clubbing, cyanosis or edema.  


NEUROLOGIC EXAM:  Alert and oriented x 4. no focal sensory or strength deficits.

 


PSYCHIATRIC:  Mood normal.


SKIN:  detail exam as documented in skin assessment





- Constitutional


Vitals: 


                                        











Temp Pulse Resp BP Pulse Ox


 


 97.8 F   63   20   97/54   99 


 


 12/30/21 22:39  12/31/21 06:37  12/31/21 06:37  12/31/21 06:37  12/31/21 06:37











General appearance: Present: no acute distress, well-nourished





Results





- Labs


CBC & Chem 7: 


                                 12/30/21 23:51





                                 12/30/21 23:51


Labs: 


                             Laboratory Last Values











WBC  10.6 K/mm3 (4.5-11.0)   12/30/21  23:51    


 


RBC  4.19 M/mm3 (3.65-5.03)   12/30/21  23:51    


 


Hgb  11.4 gm/dl (11.8-15.2)  L  12/30/21  23:51    


 


Hct  35.3 % (35.5-45.6)  L  12/30/21  23:51    


 


MCV  84 fl (84-94)   12/30/21  23:51    


 


MCH  27 pg (28-32)  L  12/30/21  23:51    


 


MCHC  32 % (32-34)   12/30/21  23:51    


 


RDW  19.9 % (13.2-15.2)  H  12/30/21  23:51    


 


Plt Count  264 K/mm3 (140-440)   12/30/21  23:51    


 


Lymph % (Auto)  11.4 % (13.4-35.0)  L  12/30/21  23:51    


 


Mono % (Auto)  13.3 % (0.0-7.3)  H  12/30/21  23:51    


 


Eos % (Auto)  0.0 % (0.0-4.3)   12/30/21  23:51    


 


Baso % (Auto)  0.1 % (0.0-1.8)   12/30/21  23:51    


 


Lymph # (Auto)  1.2 K/mm3 (1.2-5.4)   12/30/21  23:51    


 


Mono # (Auto)  1.4 K/mm3 (0.0-0.8)  H  12/30/21  23:51    


 


Eos # (Auto)  0.0 K/mm3 (0.0-0.4)   12/30/21  23:51    


 


Baso # (Auto)  0.0 K/mm3 (0.0-0.1)   12/30/21  23:51    


 


Seg Neutrophils %  75.2 % (40.0-70.0)  H  12/30/21  23:51    


 


Seg Neutrophils #  8.0 K/mm3 (1.8-7.7)  H  12/30/21  23:51    


 


Sodium  127 mmol/L (137-145)  L  12/30/21  23:51    


 


Potassium  2.0 mmol/L (3.6-5.0)  L*  12/30/21  23:51    


 


Chloride  86.1 mmol/L ()  L  12/30/21  23:51    


 


Carbon Dioxide  25 mmol/L (22-30)   12/30/21  23:51    


 


Anion Gap  18 mmol/L  12/30/21  23:51    


 


BUN  15 mg/dL (9-20)   12/30/21  23:51    


 


Creatinine  1.3 mg/dL (0.8-1.3)   12/30/21  23:51    


 


Estimated GFR  > 60 ml/min  12/30/21  23:51    


 


BUN/Creatinine Ratio  12 %  12/30/21  23:51    


 


Glucose  124 mg/dL ()  H  12/30/21  23:51    


 


Calcium  8.1 mg/dL (8.4-10.2)  L  12/30/21  23:51    


 


Total Bilirubin  1.40 mg/dL (0.1-1.2)  H  12/30/21  23:51    


 


AST  28 units/L (5-40)   12/30/21  23:51    


 


ALT  17 units/L (7-56)   12/30/21  23:51    


 


Alkaline Phosphatase  141 units/L ()  H  12/30/21  23:51    


 


NT-Pro-B Natriuret Pep  1300 pg/mL (0-900)  H  12/30/21  23:51    


 


Total Protein  5.9 g/dL (6.3-8.2)  L  12/30/21  23:51    


 


Albumin  2.5 g/dL (3.9-5)  L  12/30/21  23:51    


 


Albumin/Globulin Ratio  0.7 %  12/30/21  23:51    


 


Urine Color  Maren  (Yellow)   12/31/21  01:00    


 


Urine Turbidity  Slightly-cloudy  (Clear)   12/31/21  01:00    


 


Urine pH  6.0  (5.0-7.0)   12/31/21  01:00    


 


Ur Specific Gravity  1.012  (1.003-1.030)   12/31/21  01:00    


 


Urine Protein  30 mg/dl mg/dL (Negative)   12/31/21  01:00    


 


Urine Glucose (UA)  Neg mg/dL (Negative)   12/31/21  01:00    


 


Urine Ketones  Neg mg/dL (Negative)   12/31/21  01:00    


 


Urine Blood  Neg  (Negative)   12/31/21  01:00    


 


Urine Nitrite  Neg  (Negative)   12/31/21  01:00    


 


Urine Bilirubin  Neg  (Negative)   12/31/21  01:00    


 


Urine Urobilinogen  < 2.0 mg/dL (<2.0)   12/31/21  01:00    


 


Ur Leukocyte Esterase  Neg  (Negative)   12/31/21  01:00    


 


Urine WBC (Auto)  9.0 /HPF (0.0-6.0)  H  12/31/21  01:00    


 


Urine RBC (Auto)  3.0 /HPF (0.0-6.0)   12/31/21  01:00    


 


U Epithel Cells (Auto)  < 1.0 /HPF (0-13.0)   12/31/21  01:00    


 


Hyaline Casts  8 /LPF  12/31/21  01:00    


 


Urine Mucus  Few /HPF  12/31/21  01:00    














Active Medications





- Current Medications


Current Medications: 














Generic Name Dose Route Start Last Admin





  Trade Name Freq  PRN Reason Stop Dose Admin


 


Acetaminophen  650 mg  12/31/21 03:51 





  Acetaminophen 325 Mg Tab  PO  





  Q4H PRN  





  Pain MILD(1-3)/Fever >100.5/HA  


 


Albuterol  2.5 mg  12/31/21 03:51 





  Albuterol 2.5 Mg/3 Ml Nebu  IH  





  Q4HRT PRN  





  Shortness Of Breath  


 


Albuterol/Ipratropium  1 ampul  12/31/21 08:00 





  Ipratropium/Albuterol Sulfate 3 Ml Ampul.Neb  IH  





  Q6HRT SHAY  


 


Famotidine  10 mg  12/31/21 10:00 





  Famotidine 10 Mg Tab  PO  





  BID SHAY  


 


Furosemide  20 mg  12/31/21 10:00 





  Furosemide 20 Mg/2 Ml Inj  IV  





  QDAY SHAY  


 


Heparin Sodium (Porcine)  5,000 unit  12/31/21 10:00 





  Heparin 5,000 Unit/1 Ml Vial  SUB-Q  





  Q12HR SHAY  


 


Hydromorphone HCl  0.5 mg  12/31/21 03:51 





  Hydromorphone 1 Mg/1 Ml Inj  IV  





  Q3H PRN  





  Pain , Severe (7-10)  


 


Morphine Sulfate  2 mg  12/31/21 03:51 





  Morphine 2 Mg/1 Ml Inj  IV  





  Q4H PRN  





  Pain, Moderate (4-6)  


 


Ondansetron HCl  4 mg  12/31/21 03:51 





  Ondansetron 4 Mg/2 Ml Inj  IV  





  Q8H PRN  





  Nausea And Vomiting  


 


Sodium Chloride  10 ml  12/31/21 10:00 





  Sodium Chloride 0.9% 10 Ml Flush Syringe  IV  





  BID SHAY  


 


Sodium Chloride  10 ml  12/31/21 03:51 





  Sodium Chloride 0.9% 10 Ml Flush Syringe  IV  





  PRN PRN  





  LINE FLUSH
Assessment and Plan


Impression 


* Acute kidney injury 


* Volume overload 


* Hyponatremia 


* Hypokalemia 





Recommendations


His renal function has improved and back to normal


Patient is clinically still volume overloaded


Continue diuretic as needed


Hyponatremia has been corrected.  Continue free water restriction


Hypokalemia has been corrected as well


Shall follow patient peripherally











Subjective


Date of service: 01/07/22


Interval history: 


Patient is currently on Ventimask with 50% FiO2.  Not answering questions.








Objective





- Vital Signs


Vital signs: 


                               Vital Signs - 12hr











  01/06/22 01/06/22 01/07/22





  22:00 23:41 04:47


 


Temperature  97.6 F 97.5 F L


 


Pulse Rate  89 


 


Respiratory 24 18 18





Rate   


 


Blood Pressure  108/65 115/56


 


O2 Sat by Pulse 96 99 





Oximetry   














  01/07/22 01/07/22





  08:00 09:00


 


Temperature  98.0 F


 


Pulse Rate  101 H


 


Respiratory 24 18





Rate  


 


Blood Pressure  128/56


 


O2 Sat by Pulse 96 96





Oximetry  














- General Appearance


General appearance: well-developed, well-nourished, appears stated age


EENT: PERRL, mucous membranes moist


Neck: no JVD, no thyromegaly, no carotid bruit, supple


Respiratory: Present: Decreased Breath Sounds (At the bases)


Cardiology: regular, normal heart rate


Gastrointestinal: normal, normoactive bowel sounds


Integumentary: other (1+ edema.  More on the right side.  Chronic skin changes 

noted in both lower extremities)





- Lab





                                 01/07/22 10:07





                                 01/07/22 10:07


                             Most recent lab results











ABG pH  7.431 pH Units (7.350-7.450)   01/06/22  12:30    


 


ABG pCO2  49.5 mm Hg  01/06/22  12:30    


 


ABG pO2  25.2 mm Hg (80.0-90.0)  L*  01/06/22  12:30    


 


ABG HCO3  32.2 mmol/L (20.0-26.0)  H  01/06/22  12:30    


 


ABG O2 Saturation  41.1 % (95.0-99.0)  L  01/06/22  12:30    


 


Calcium  8.5 mg/dL (8.4-10.2)   01/05/22  14:48    


 


Magnesium  1.70 mg/dL (1.7-2.3)   01/05/22  14:48    














Medications & Allergies





- Medications


Allergies/Adverse Reactions: 


                                    Allergies





No Known Allergies Allergy (Verified 12/30/21 22:39)


   








Active Medications: 














Generic Name Dose Route Start Last Admin





  Trade Name Freq  PRN Reason Stop Dose Admin


 


Acetaminophen  650 mg  12/31/21 03:51  01/02/22 22:07





  Acetaminophen 325 Mg Tab  PO   650 mg





  Q4H PRN   Administration





  Pain MILD(1-3)/Fever >100.5/HA  


 


Albuterol  2.5 mg  01/04/22 12:00 





  Albuterol 2.5 Mg/3 Ml Nebu  IH  





  Q4HRT PRN  





  Shortness Of Breath  


 


Albuterol/Ipratropium  1 ampul  01/04/22 14:00  01/07/22 09:19





  Ipratropium/Albuterol Sulfate 3 Ml Ampul.Neb  IH   Not Given





  TIDRT SHAY  


 


Apixaban  10 mg  01/05/22 10:00  01/07/22 09:20





  Apixaban 5 Mg Tab  PO  01/11/22 22:01  10 mg





  Q12HR SHAY   Administration





  Protocol  


 


Apixaban  5 mg  01/12/22 10:00 





  Apixaban 5 Mg Tab  PO  





  Q12HR SHAY  





  Protocol  


 


Famotidine  10 mg  12/31/21 10:00  01/07/22 09:20





  Famotidine 10 Mg Tab  PO   10 mg





  BID SHAY   Administration


 


Hydromorphone HCl  0.5 mg  12/31/21 03:51 





  Hydromorphone 1 Mg/1 Ml Inj  IV  





  Q3H PRN  





  Pain , Severe (7-10)  


 


Metoprolol Tartrate  12.5 mg  01/03/22 22:00  01/07/22 09:20





  Metoprolol Tartrate 25 Mg Tab  PO   12.5 mg





  BID SHAY   Administration


 


Morphine Sulfate  2 mg  12/31/21 03:51  01/06/22 09:43





  Morphine 2 Mg/1 Ml Inj  IV   2 mg





  Q4H PRN   Administration





  Pain, Moderate (4-6)  


 


Ondansetron HCl  4 mg  12/31/21 03:51 





  Ondansetron 4 Mg/2 Ml Inj  IV  





  Q8H PRN  





  Nausea And Vomiting  


 


Sodium Chloride  10 ml  12/31/21 10:00  01/07/22 09:20





  Sodium Chloride 0.9% 10 Ml Flush Syringe  IV   10 ml





  BID SHAY   Administration


 


Sodium Chloride  10 ml  12/31/21 03:51 





  Sodium Chloride 0.9% 10 Ml Flush Syringe  IV  





  PRN PRN  





  LINE FLUSH  


 


Spironolactone  25 mg  12/31/21 16:00  01/07/22 09:20





  Spironolactone 25 Mg Tab  PO   25 mg





  QDAY SHAY   Administration
Subjective


Date of service: 01/14/22


Principal diagnosis: hypokalemia


Interval history: 





Impression 


* Acute kidney injury 


* Volume overload 


* Hyponatremia 


* hypomagnsemia


* Hypokalemia 





Recommendations


His renal function has improved and back to normal


replete k and mag prn


daily lytes


strict i/os


Shall follow patient peripherally











Subjective





Interval history: 


Patient is currently on Ventimask with 50% FiO2.  Not answering questions.








Objective





- General Appearance


General appearance: well-developed, well-nourished, appears stated age


EENT: PERRL, mucous membranes moist


Neck: no JVD, no thyromegaly, no carotid bruit, supple


Respiratory: Present: Decreased Breath Sounds (At the bases)


Cardiology: regular, normal heart rate


Gastrointestinal: normal, normoactive bowel sounds


Integumentary: other (1+ edema.  More on the right side.  Chronic skin changes 

noted in both lower extremities)








Objective





- Vital Signs


Vital signs: 


                               Vital Signs - 12hr











  01/14/22 01/14/22 01/14/22





  08:35 09:19 09:21


 


Temperature   98.3 F


 


Pulse Rate 71  71


 


Pulse Rate [  63 





Anterior   





Bilateral   





Throughout]   


 


Pulse Rate [   





Radial]   


 


Respiratory   20





Rate   


 


Respiratory  18 





Rate [Anterior   





Bilateral   





Throughout]   


 


Blood Pressure   133/67


 


O2 Sat by Pulse   98





Oximetry   














  01/14/22 01/14/22 01/14/22





  11:00 15:07 15:40


 


Temperature   97.3 F L


 


Pulse Rate   76


 


Pulse Rate [  68 





Anterior   





Bilateral   





Throughout]   


 


Pulse Rate [ 71  





Radial]   


 


Respiratory 18  18





Rate   


 


Respiratory  18 





Rate [Anterior   





Bilateral   





Throughout]   


 


Blood Pressure   112/52


 


O2 Sat by Pulse 95  94





Oximetry   














- Lab





                                 01/12/22 07:53





                                 01/14/22 09:02


                             Most recent lab results











ABG pH  7.431 pH Units (7.350-7.450)   01/06/22  12:30    


 


ABG pCO2  49.5 mm Hg  01/06/22  12:30    


 


ABG pO2  25.2 mm Hg (80.0-90.0)  L*  01/06/22  12:30    


 


ABG HCO3  32.2 mmol/L (20.0-26.0)  H  01/06/22  12:30    


 


ABG O2 Saturation  41.1 % (95.0-99.0)  L  01/06/22  12:30    


 


Calcium  8.1 mg/dL (8.4-10.2)  L  01/14/22  09:02    


 


Magnesium  1.50 mg/dL (1.7-2.3)  L  01/14/22  09:02    














Medications & Allergies





- Medications


Allergies/Adverse Reactions: 


                                    Allergies





No Known Allergies Allergy (Verified 12/30/21 22:39)


   








Home Medications: 


                                Home Medications











 Medication  Instructions  Recorded  Confirmed  Last Taken  Type


 


Acetaminophen [Aphen] 650 mg PO Q6H 01/12/22 01/12/22 Unknown History


 


Apixaban [Eliquis] 2.5 mg PO BID 01/12/22 01/12/22 Unknown History


 


AtorvaSTATin [Lipitor] 40 mg PO QHS 01/12/22 01/12/22 Unknown History


 


Baclofen 5 mg PO TID 01/12/22 01/12/22 Unknown History


 


Clopidogrel [Plavix] 75 mg PO QDAY 01/12/22 01/12/22 Unknown History


 


Dicyclomine [Bentyl] 10 mg PO TID 01/12/22 01/12/22 Unknown History


 


Ferrous Sulfate [Iron 325 MG] 325 mg PO BID 01/12/22 01/12/22 Unknown History


 


Mv-Mn/Iron/Folic Acid/Herb 190 1 tab PO QDAY 01/12/22 01/12/22 Unknown History





[Vitamin D3 Complete Caplet]     


 


Ondansetron (Nf) [Zofran TAB] 8 mg PO Q8HR PRN 01/12/22 01/12/22 Unknown History


 


Pantoprazole [Protonix] 40 mg PO QAM 01/12/22 01/12/22 Unknown History


 


Prochlorperazine Maleate 10 mg PO QID 01/12/22 01/12/22 Unknown History





[Compazine]     


 


Tamsulosin [Flomax] 0.4 mg PO QDAY 01/12/22 01/12/22 Unknown History


 


amLODIPine [Norvasc] 5 mg PO DAILY 01/12/22 01/12/22 Unknown History


 


lisinopriL [Lisinopril] 20 mg PO QDAY 01/12/22 01/12/22 Unknown History











Active Medications: 














Generic Name Dose Route Start Last Admin





  Trade Name Freq  PRN Reason Stop Dose Admin


 


Acetaminophen  650 mg  12/31/21 03:51  01/02/22 22:07





  Acetaminophen 325 Mg Tab  PO   650 mg





  Q4H PRN   Administration





  Pain MILD(1-3)/Fever >100.5/HA  


 


Albuterol  2.5 mg  01/04/22 12:00 





  Albuterol 2.5 Mg/3 Ml Nebu  IH  





  Q4HRT PRN  





  Shortness Of Breath  


 


Albuterol/Ipratropium  1 ampul  01/04/22 14:00  01/14/22 15:07





  Ipratropium/Albuterol Sulfate 3 Ml Ampul.Neb  IH   1 ampul





  TIDRT SHAY   Administration


 


Apixaban  5 mg  01/12/22 10:00  01/14/22 09:34





  Apixaban 5 Mg Tab  PO   5 mg





  Q12HR SHAY   Administration





  Protocol  


 


Famotidine  10 mg  12/31/21 10:00  01/14/22 09:34





  Famotidine 10 Mg Tab  PO   10 mg





  BID SHAY   Administration


 


Potassium Chloride 20 meq/  1,010 mls @ 100 mls/hr  01/14/22 11:00  01/14/22 

11:31





  Dextrose  IV   100 mls/hr





  AS DIRECT SHAY   Administration


 


Metoprolol Tartrate  12.5 mg  01/03/22 22:00  01/14/22 09:34





  Metoprolol Tartrate 25 Mg Tab  PO   12.5 mg





  BID SHAY   Administration


 


Ondansetron HCl  4 mg  12/31/21 03:51 





  Ondansetron 4 Mg/2 Ml Inj  IV  





  Q8H PRN  





  Nausea And Vomiting  


 


Sodium Chloride  10 ml  12/31/21 10:00  01/14/22 09:35





  Sodium Chloride 0.9% 10 Ml Flush Syringe  IV   10 ml





  BID SHAY   Administration


 


Sodium Chloride  10 ml  12/31/21 03:51 





  Sodium Chloride 0.9% 10 Ml Flush Syringe  IV  





  PRN PRN  





  LINE FLUSH  


 


Spironolactone  25 mg  12/31/21 16:00  01/14/22 09:34





  Spironolactone 25 Mg Tab  PO   25 mg





  QDAY SHAY   Administration
Subjective


Date of service: 01/15/22


Principal diagnosis: hypokalemia


Interval history: 





Impression 


* Acute kidney injury 


* Volume overload 


* Hyponatremia 


* hypomagnsemia


* Hypokalemia 





Recommendations


His renal function has improved and back to normal


replete k and mag prn


add kcl to d5w


daily lytes


strict i/os


Shall follow patient peripherally











Subjective





Interval history: 


Patient is currently on Ventimask with 50% FiO2.  Not answering questions.








Objective





- General Appearance


General appearance: well-developed, well-nourished, appears stated age


EENT: PERRL, mucous membranes moist


Neck: no JVD, no thyromegaly, no carotid bruit, supple


Respiratory: Present: Decreased Breath Sounds (At the bases)


Cardiology: regular, normal heart rate


Gastrointestinal: normal, normoactive bowel sounds


Integumentary: other (1+ edema.  More on the right side.  Chronic skin changes 

noted in both lower extremities)








Objective





- Vital Signs


Vital signs: 


                               Vital Signs - 12hr











  01/14/22 01/14/22 01/14/22





  22:00 23:00 23:12


 


Temperature  97.2 F L 


 


Pulse Rate 79 76 


 


Pulse Rate [   





Anterior   





Bilateral   





Throughout]   


 


Respiratory  18 





Rate   


 


Respiratory   





Rate [Anterior   





Bilateral   





Throughout]   


 


Blood Pressure  146/69 


 


O2 Sat by Pulse  97 96





Oximetry   














  01/15/22 01/15/22 01/15/22





  03:28 08:11 08:47


 


Temperature 97.1 F L 98.7 F 


 


Pulse Rate 72 76 


 


Pulse Rate [   





Anterior   





Bilateral   





Throughout]   


 


Respiratory 18 20 





Rate   


 


Respiratory   





Rate [Anterior   





Bilateral   





Throughout]   


 


Blood Pressure 141/74 153/82 


 


O2 Sat by Pulse 77 L 91 100





Oximetry   














  01/15/22





  08:48


 


Temperature 


 


Pulse Rate 


 


Pulse Rate [ 74





Anterior 





Bilateral 





Throughout] 


 


Respiratory 





Rate 


 


Respiratory 18





Rate [Anterior 





Bilateral 





Throughout] 


 


Blood Pressure 


 


O2 Sat by Pulse 





Oximetry 














- Lab





                                 01/12/22 07:53





                                 01/14/22 09:02


                             Most recent lab results











ABG pH  7.431 pH Units (7.350-7.450)   01/06/22  12:30    


 


ABG pCO2  49.5 mm Hg  01/06/22  12:30    


 


ABG pO2  25.2 mm Hg (80.0-90.0)  L*  01/06/22  12:30    


 


ABG HCO3  32.2 mmol/L (20.0-26.0)  H  01/06/22  12:30    


 


ABG O2 Saturation  41.1 % (95.0-99.0)  L  01/06/22  12:30    


 


Calcium  8.1 mg/dL (8.4-10.2)  L  01/14/22  09:02    


 


Magnesium  1.50 mg/dL (1.7-2.3)  L  01/14/22  09:02    














Medications & Allergies





- Medications


Allergies/Adverse Reactions: 


                                    Allergies





No Known Allergies Allergy (Verified 12/30/21 22:39)


   








Home Medications: 


                                Home Medications











 Medication  Instructions  Recorded  Confirmed  Last Taken  Type


 


Acetaminophen [Aphen] 650 mg PO Q6H 01/12/22 01/12/22 Unknown History


 


Apixaban [Eliquis] 2.5 mg PO BID 01/12/22 01/12/22 Unknown History


 


AtorvaSTATin [Lipitor] 40 mg PO QHS 01/12/22 01/12/22 Unknown History


 


Baclofen 5 mg PO TID 01/12/22 01/12/22 Unknown History


 


Clopidogrel [Plavix] 75 mg PO QDAY 01/12/22 01/12/22 Unknown History


 


Dicyclomine [Bentyl] 10 mg PO TID 01/12/22 01/12/22 Unknown History


 


Ferrous Sulfate [Iron 325 MG] 325 mg PO BID 01/12/22 01/12/22 Unknown History


 


Mv-Mn/Iron/Folic Acid/Herb 190 1 tab PO QDAY 01/12/22 01/12/22 Unknown History





[Vitamin D3 Complete Caplet]     


 


Ondansetron (Nf) [Zofran TAB] 8 mg PO Q8HR PRN 01/12/22 01/12/22 Unknown History


 


Pantoprazole [Protonix] 40 mg PO QAM 01/12/22 01/12/22 Unknown History


 


Prochlorperazine Maleate 10 mg PO QID 01/12/22 01/12/22 Unknown History





[Compazine]     


 


Tamsulosin [Flomax] 0.4 mg PO QDAY 01/12/22 01/12/22 Unknown History


 


amLODIPine [Norvasc] 5 mg PO DAILY 01/12/22 01/12/22 Unknown History


 


lisinopriL [Lisinopril] 20 mg PO QDAY 01/12/22 01/12/22 Unknown History











Active Medications: 














Generic Name Dose Route Start Last Admin





  Trade Name Freq  PRN Reason Stop Dose Admin


 


Acetaminophen  650 mg  12/31/21 03:51  01/02/22 22:07





  Acetaminophen 325 Mg Tab  PO   650 mg





  Q4H PRN   Administration





  Pain MILD(1-3)/Fever >100.5/HA  


 


Albuterol  2.5 mg  01/04/22 12:00 





  Albuterol 2.5 Mg/3 Ml Nebu  IH  





  Q4HRT PRN  





  Shortness Of Breath  


 


Albuterol/Ipratropium  1 ampul  01/04/22 14:00  01/15/22 08:48





  Ipratropium/Albuterol Sulfate 3 Ml Ampul.Neb  IH   1 ampul





  TIDRT SHAY   Administration


 


Apixaban  5 mg  01/12/22 10:00  01/15/22 09:09





  Apixaban 5 Mg Tab  PO   5 mg





  Q12HR SHAY   Administration





  Protocol  


 


Famotidine  10 mg  12/31/21 10:00  01/15/22 09:09





  Famotidine 10 Mg Tab  PO   10 mg





  BID SHAY   Administration


 


Potassium Chloride 20 meq/  1,010 mls @ 100 mls/hr  01/14/22 11:00  01/15/22 04

:22





  Dextrose  IV   100 mls/hr





  AS DIRECT SHAY   Administration


 


Metoprolol Tartrate  12.5 mg  01/03/22 22:00  01/15/22 09:09





  Metoprolol Tartrate 25 Mg Tab  PO   12.5 mg





  BID SHAY   Administration


 


Ondansetron HCl  4 mg  12/31/21 03:51 





  Ondansetron 4 Mg/2 Ml Inj  IV  





  Q8H PRN  





  Nausea And Vomiting  


 


Sodium Chloride  10 ml  12/31/21 10:00  01/15/22 09:09





  Sodium Chloride 0.9% 10 Ml Flush Syringe  IV   10 ml





  BID SHAY   Administration


 


Sodium Chloride  10 ml  12/31/21 03:51 





  Sodium Chloride 0.9% 10 Ml Flush Syringe  IV  





  PRN PRN  





  LINE FLUSH  


 


Spironolactone  25 mg  12/31/21 16:00  01/15/22 09:09





  Spironolactone 25 Mg Tab  PO   25 mg





  QDAY SHAY   Administration
Subjective


Interval history: 


Patient was seen today for follow-up of multiple renal related issues


No complaints of any chest pain pressure or shortness of breath


Interdisciplinary notes that also reviewed


Events of 24 hours vitals labs intake output medications were reviewed








Past medical history: Reviewed


Family history: Reviewed


Social history: Reviewed


Allergies: Reviewed








Physical examination:


Vitals: Reviewed


HEENT: No pallor or icterus oral mucosa moist 


Neck: Supple no JVD no thyromegaly


Chest: Bilateral clear to auscultation anteriorly


Heart: Regular rate and rhythm S1-S2 heard no S3-S4


Abdomen: Soft nontender no voluntary guarding rigidity rebound


Extremity: Dry skin less than 1+ peripheral edema


Psychiatric: No evidence of agitation and aggression noted


Dermatology: No petechial rashes





Labs and x-rays: Reviewed from today





Assessment and allie


#Hypokalemia, severe, improving,


Need to make sure she does not have any renal potassium wasting,


Patient was also having issues with diarrhea,


Continue to replace electrolytes, check urine potassium


At this time urinalysis does not show any evidence of blood only 30 mg of prot

ein,


Albumin however is 2.5 which is very concerning rule out other causes for 

malnutrition/?  Liver disease, bilirubin noted to be elevated,?  Compromised 

synthetic function, AST ALT normal will follow up


Check coagulation studies


#Proteinuria, follow-up on protein creatinine ratio moderate protein 

restriction,


Will need ARB, Aldactone for now


Fluid restriction 1200 cc per day


There is no emergent indication for kidney biopsy,





#Hyponatremia: Improving in the setting of elevated proBNP and volume overload





#Heart failure, need to rule out any possibility of right-sided heart 

failure/pulmonary hypertension etc.





#Patient does have multiple risk factors for underlying chronic kidney disease i

ncluding prior history of hypertension CVA,





We'll continue to follow and make recommendation for renal standpoint











Objective





- Vital Signs


Vital signs: 


                               Vital Signs - 12hr











  01/02/22 01/02/22 01/03/22





  21:59 22:00 01:21


 


Temperature 98.7 F  


 


Pulse Rate 115 H  


 


Pulse Rate [  107 H 





Anterior   





Bilateral   





Throughout]   


 


Respiratory 18  





Rate   


 


Respiratory  16 





Rate [Anterior   





Bilateral   





Throughout]   


 


Blood Pressure 105/5  





[Right]   


 


O2 Sat by Pulse 100 97 95





Oximetry   














  01/03/22





  04:47


 


Temperature 97.6 F


 


Pulse Rate 91 H


 


Pulse Rate [ 





Anterior 





Bilateral 





Throughout] 


 


Respiratory 18





Rate 


 


Respiratory 





Rate [Anterior 





Bilateral 





Throughout] 


 


Blood Pressure 102/61





[Right] 


 


O2 Sat by Pulse 96





Oximetry 














- Lab





                                 01/03/22 20:38





                                 01/03/22 00:06


                             Most recent lab results











Calcium  8.1 mg/dL (8.4-10.2)  L  01/03/22  00:06    


 


Magnesium  1.90 mg/dL (1.7-2.3)   01/01/22  11:15    














Medications & Allergies





- Medications


Allergies/Adverse Reactions: 


                                    Allergies





No Known Allergies Allergy (Verified 12/30/21 22:39)


   








Active Medications: 














Generic Name Dose Route Start Last Admin





  Trade Name Freq  PRN Reason Stop Dose Admin


 


Acetaminophen  650 mg  12/31/21 03:51  01/02/22 22:07





  Acetaminophen 325 Mg Tab  PO   650 mg





  Q4H PRN   Administration





  Pain MILD(1-3)/Fever >100.5/HA  


 


Albuterol  2.5 mg  12/31/21 03:51 





  Albuterol 2.5 Mg/3 Ml Nebu  IH  





  Q4HRT PRN  





  Shortness Of Breath  


 


Albuterol/Ipratropium  1 ampul  12/31/21 08:00  01/03/22 08:29





  Ipratropium/Albuterol Sulfate 3 Ml Ampul.Neb  IH   1 ampul





  Q6HRT SHAY   Administration


 


Famotidine  10 mg  12/31/21 10:00  01/02/22 22:05





  Famotidine 10 Mg Tab  PO   10 mg





  BID SHAY   Administration


 


Heparin Sodium (Porcine)  5,000 unit  12/31/21 10:00  01/02/22 22:05





  Heparin 5,000 Unit/1 Ml Vial  SUB-Q   5,000 unit





  Q12HR SHAY   Administration


 


Hydromorphone HCl  0.5 mg  12/31/21 03:51 





  Hydromorphone 1 Mg/1 Ml Inj  IV  





  Q3H PRN  





  Pain , Severe (7-10)  


 


Morphine Sulfate  2 mg  12/31/21 03:51  01/03/22 04:51





  Morphine 2 Mg/1 Ml Inj  IV   2 mg





  Q4H PRN   Administration





  Pain, Moderate (4-6)  


 


Ondansetron HCl  4 mg  12/31/21 03:51 





  Ondansetron 4 Mg/2 Ml Inj  IV  





  Q8H PRN  





  Nausea And Vomiting  


 


Potassium Chloride  40 meq  01/01/22 17:00  01/02/22 13:09





  Potassium Chloride Er 20 Meq Tab  PO   40 meq





  QDAY SHAY   Administration


 


Sodium Chloride  10 ml  12/31/21 10:00  01/02/22 22:06





  Sodium Chloride 0.9% 10 Ml Flush Syringe  IV   10 ml





  BID SHAY   Administration


 


Sodium Chloride  10 ml  12/31/21 03:51 





  Sodium Chloride 0.9% 10 Ml Flush Syringe  IV  





  PRN PRN  





  LINE FLUSH  


 


Spironolactone  25 mg  12/31/21 16:00  01/02/22 13:09





  Spironolactone 25 Mg Tab  PO   25 mg





  QDAY SHAY   Administration
Subjective


Interval history: 


Patient was seen today for follow-up of multiple renal related issues


No complaints of any chest pain pressure or shortness of breath


no basic metabolic profile her magnesium done today


Interdisciplinary notes that also reviewed


Events of 24 hours vitals labs intake output medications were reviewed








Past medical history: Reviewed


Family history: Reviewed


Social history: Reviewed


Allergies: Reviewed








Physical examination:


Vitals: Reviewed


HEENT: No pallor or icterus oral mucosa moist 


Neck: Supple no JVD no thyromegaly


Chest: Bilateral clear to auscultation anteriorly


Heart: Regular rate and rhythm S1-S2 heard no S3-S4


Abdomen: Soft nontender no voluntary guarding rigidity rebound


Extremity: Dry skin less than 1+ peripheral edema


Psychiatric: No evidence of agitation and aggression noted


Dermatology: No petechial rashes





Labs and x-rays: Reviewed from today





Assessment and plan





Will order for basic metabolic profile as well as magnesium level for follow-up


follow-up on the pending labs


Overall stable from renal standpoint


We'll continue to follow and make recommendation for renal standpoint





Objective





- Vital Signs


Vital signs: 


                               Vital Signs - 12hr











  01/04/22 01/04/22 01/04/22





  22:00 23:22 23:25


 


Temperature 97.7 F  


 


Pulse Rate 69  


 


Pulse Rate [   64





Anterior   





Bilateral   





Throughout]   


 


Respiratory 18  





Rate   


 


Respiratory   20





Rate [Anterior   





Bilateral   





Throughout]   


 


Blood Pressure 85/56  





[Right]   


 


O2 Sat by Pulse 95 100 





Oximetry   














  01/05/22 01/05/22





  01:00 06:00


 


Temperature 98.5 F 97.9 F


 


Pulse Rate 87 78


 


Pulse Rate [  





Anterior  





Bilateral  





Throughout]  


 


Respiratory 18 18





Rate  


 


Respiratory  





Rate [Anterior  





Bilateral  





Throughout]  


 


Blood Pressure 111/59 110/60





[Right]  


 


O2 Sat by Pulse 90 95





Oximetry  














- Lab





                                01/05/22 Unknown





                                 01/05/22 14:48


                             Most recent lab results











Calcium  7.9 mg/dL (8.4-10.2)  L  01/04/22  04:48    


 


Magnesium  1.90 mg/dL (1.7-2.3)   01/01/22  11:15    














Medications & Allergies





- Medications


Allergies/Adverse Reactions: 


                                    Allergies





No Known Allergies Allergy (Verified 12/30/21 22:39)


   








Active Medications: 














Generic Name Dose Route Start Last Admin





  Trade Name Freq  PRN Reason Stop Dose Admin


 


Acetaminophen  650 mg  12/31/21 03:51  01/02/22 22:07





  Acetaminophen 325 Mg Tab  PO   650 mg





  Q4H PRN   Administration





  Pain MILD(1-3)/Fever >100.5/HA  


 


Albuterol  2.5 mg  01/04/22 12:00 





  Albuterol 2.5 Mg/3 Ml Nebu  IH  





  Q4HRT PRN  





  Shortness Of Breath  


 


Albuterol/Ipratropium  1 ampul  01/04/22 14:00  01/04/22 23:25





  Ipratropium/Albuterol Sulfate 3 Ml Ampul.Neb  IH   1 ampul





  TIDRT SHAY   Administration


 


Famotidine  10 mg  12/31/21 10:00  01/04/22 22:57





  Famotidine 10 Mg Tab  PO   10 mg





  BID SHAY   Administration


 


Heparin Sodium (Porcine)  3,000 unit  01/04/22 11:30 





  Heparin 10,000 Units/10 Ml Vial  IV  





  Q6H PRN  





  Anti-Xa Assay < 0.1 units/ml  


 


Hydromorphone HCl  0.5 mg  12/31/21 03:51 





  Hydromorphone 1 Mg/1 Ml Inj  IV  





  Q3H PRN  





  Pain , Severe (7-10)  


 


Heparin Sodium/Sodium Chloride  25,000 unit in 500 mls @ 23 mls/hr  01/04/22 

11:30  01/05/22 06:31





  Heparin/ 0.45% Nacl-25,000 Unit/500 Ml  IV   1,350 units/hr





  TITR SHAY   27 mls/hr





    Administration





  Protocol  





  1,150 UNITS/HR  


 


Metoprolol Tartrate  12.5 mg  01/03/22 22:00  01/04/22 22:55





  Metoprolol Tartrate 25 Mg Tab  PO   Not Given





  BID [facility]  


 


Morphine Sulfate  2 mg  12/31/21 03:51  01/03/22 18:25





  Morphine 2 Mg/1 Ml Inj  IV   2 mg





  Q4H PRN   Administration





  Pain, Moderate (4-6)  


 


Ondansetron HCl  4 mg  12/31/21 03:51 





  Ondansetron 4 Mg/2 Ml Inj  IV  





  Q8H PRN  





  Nausea And Vomiting  


 


Potassium Chloride  40 meq  01/01/22 17:00  01/04/22 10:38





  Potassium Chloride Er 20 Meq Tab  PO   40 meq





  QDAY SHAY   Administration


 


Sodium Chloride  10 ml  12/31/21 10:00  01/03/22 21:24





  Sodium Chloride 0.9% 10 Ml Flush Syringe  IV   10 ml





  BID SHAY   Administration


 


Sodium Chloride  10 ml  12/31/21 03:51 





  Sodium Chloride 0.9% 10 Ml Flush Syringe  IV  





  PRN PRN  





  LINE FLUSH  


 


Spironolactone  25 mg  12/31/21 16:00  01/04/22 10:37





  Spironolactone 25 Mg Tab  PO   25 mg





  QDAY SHAY   Administration
Subjective


Interval history: 


Patient was seen today for follow-up of multiple renal related issues


resting comfortably in bed


Events of 24 hours vitals labs intake output medications were reviewed








Past medical history: Reviewed


Family history: Reviewed


Social history: Reviewed


Allergies: Reviewed








Physical examination:


Vitals: Reviewed


HEENT: No pallor or icterus oral mucosa moist 


Neck: Supple no JVD no thyromegaly


Chest: Bilateral clear to auscultation anteriorly


Heart: Regular rate and rhythm S1-S2 heard no S3-S4


Abdomen: Soft nontender no voluntary guarding rigidity rebound


Extremity: Dry skin less than 1+ peripheral edema


Psychiatric: No evidence of agitation and aggression noted


Dermatology: No petechial rashes





Labs and x-rays: Reviewed from today





Assessment and allie


#Hypokalemia, severe, improving,


potassium is much better at this time


Follow-up on the labs and urinary studies


At this time urinalysis does not show any evidence of blood only 30 mg of 

protein,


Albumin however is 2.5 which is very concerning rule out other causes for 

malnutrition/?  Liver disease, bilirubin noted to be elevated,?  Compromised 

synthetic function, AST ALT normal will follow up


Check coagulation studies





#Proteinuria, follow-up on protein creatinine ratio moderate protein 

restriction,


Will need ARB, Aldactone for now


Fluid restriction 1200 cc per day


There is no emergent indication for kidney biopsy,





#Hyponatremia: Improving in the setting of elevated proBNP and volume overload





#Heart failure, need to rule out any possibility of right-sided heart 

failure/pulmonary hypertension etc.





#Patient does have multiple risk factors for underlying chronic kidney disease 

including prior history of hypertension CVA,





We'll continue to follow and make recommendation for renal standpoint











Objective





- Vital Signs


Vital signs: 


                               Vital Signs - 12hr











  01/03/22 01/03/22 01/04/22





  22:29 23:25 03:45


 


Temperature  98.8 F 


 


Pulse Rate 94 H 93 H 


 


Pulse Rate [   94 H





Anterior   





Bilateral   





Throughout]   


 


Respiratory  18 





Rate   


 


Respiratory   18





Rate [Anterior   





Bilateral   





Throughout]   


 


Blood Pressure  103/59 


 


O2 Sat by Pulse  95 





Oximetry   














  01/04/22 01/04/22 01/04/22





  04:17 06:11 08:00


 


Temperature 97.6 F  98.0 F


 


Pulse Rate 76  99 H


 


Pulse Rate [   





Anterior   





Bilateral   





Throughout]   


 


Respiratory 18  18





Rate   


 


Respiratory   





Rate [Anterior   





Bilateral   





Throughout]   


 


Blood Pressure 112/59  124/64


 


O2 Sat by Pulse 98 95 94





Oximetry   














  01/04/22 01/04/22 01/04/22





  09:31 09:32 09:36


 


Temperature   


 


Pulse Rate   


 


Pulse Rate [  93 H 





Anterior   





Bilateral   





Throughout]   


 


Respiratory   





Rate   


 


Respiratory  18 





Rate [Anterior   





Bilateral   





Throughout]   


 


Blood Pressure   


 


O2 Sat by Pulse 100  100





Oximetry   














- Lab





                                 01/03/22 20:38





                                 01/04/22 04:48


                             Most recent lab results











Calcium  7.9 mg/dL (8.4-10.2)  L  01/04/22  04:48    


 


Magnesium  1.90 mg/dL (1.7-2.3)   01/01/22  11:15    














Medications & Allergies





- Medications


Allergies/Adverse Reactions: 


                                    Allergies





No Known Allergies Allergy (Verified 12/30/21 22:39)


   








Active Medications: 














Generic Name Dose Route Start Last Admin





  Trade Name Freq  PRN Reason Stop Dose Admin


 


Acetaminophen  650 mg  12/31/21 03:51  01/02/22 22:07





  Acetaminophen 325 Mg Tab  PO   650 mg





  Q4H PRN   Administration





  Pain MILD(1-3)/Fever >100.5/HA  


 


Albuterol  2.5 mg  01/04/22 12:00 





  Albuterol 2.5 Mg/3 Ml Nebu  IH  





  Q4HRT PRN  





  Shortness Of Breath  


 


Albuterol/Ipratropium  1 ampul  01/04/22 14:00 





  Ipratropium/Albuterol Sulfate 3 Ml Ampul.Neb  IH  





  TIDRT SHAY  


 


Famotidine  10 mg  12/31/21 10:00  01/03/22 21:24





  Famotidine 10 Mg Tab  PO   10 mg





  BID SHAY   Administration


 


Heparin Sodium (Porcine)  3,100 unit  01/03/22 16:43 





  Heparin 10,000 Units/10 Ml Vial  40 unit/kg (3100 unit)  





  IV  





  Q6H PRN  





  Anti-Xa Assay < 0.1 units/ml  


 


Hydromorphone HCl  0.5 mg  12/31/21 03:51 





  Hydromorphone 1 Mg/1 Ml Inj  IV  





  Q3H PRN  





  Pain , Severe (7-10)  


 


Heparin Sodium/Sodium Chloride  25,000 unit in 500 mls @ 23 mls/hr  01/03/22 

17:00  01/04/22 02:28





  Heparin/ 0.45% Nacl-25,000 Unit/500 Ml  IV   1,250 units/hr





  TITR SHAY   25 mls/hr





    Titration





  Protocol  





  1,150 UNITS/HR  


 


Metoprolol Tartrate  12.5 mg  01/03/22 22:00  01/03/22 21:21





  Metoprolol Tartrate 25 Mg Tab  PO   Not Given





  BID SHAY  


 


Morphine Sulfate  2 mg  12/31/21 03:51  01/03/22 18:25





  Morphine 2 Mg/1 Ml Inj  IV   2 mg





  Q4H PRN   Administration





  Pain, Moderate (4-6)  


 


Ondansetron HCl  4 mg  12/31/21 03:51 





  Ondansetron 4 Mg/2 Ml Inj  IV  





  Q8H PRN  





  Nausea And Vomiting  


 


Potassium Chloride  40 meq  01/01/22 17:00  01/03/22 11:05





  Potassium Chloride Er 20 Meq Tab  PO   40 meq





  QDAY SHAY   Administration


 


Sodium Chloride  10 ml  12/31/21 10:00  01/03/22 21:24





  Sodium Chloride 0.9% 10 Ml Flush Syringe  IV   10 ml





  BID SHAY   Administration


 


Sodium Chloride  10 ml  12/31/21 03:51 





  Sodium Chloride 0.9% 10 Ml Flush Syringe  IV  





  PRN PRN  





  LINE FLUSH  


 


Spironolactone  25 mg  12/31/21 16:00  01/03/22 11:05





  Spironolactone 25 Mg Tab  PO   25 mg





  QDAY SHAY   Administration
Subjective


Interval history: 


Patient was seen today for follow-up of multiple renal related issues


urine studies are still currently pending


Events of 24 hours vitals labs intake output medications were reviewed








Past medical history: Reviewed


Family history: Reviewed


Social history: Reviewed


Allergies: Reviewed








Physical examination:


Vitals: Reviewed


HEENT: No pallor or icterus oral mucosa moist 


Neck: Supple no JVD no thyromegaly


Chest: Bilateral clear to auscultation anteriorly


Heart: Regular rate and rhythm S1-S2 heard no S3-S4


Abdomen: Soft nontender no voluntary guarding rigidity rebound


Extremity: peripheral edema more than 1+


Psychiatric: No evidence of agitation and aggression noted


Dermatology: No petechial rashes





Labs and x-rays: Reviewed from today





Assessment and plan


#Likely acute kidney injury creatinine has come down from 1 to 0.6 





#Hypokalemia: Much better,  last potassium was 3.4 patient needs a follow-up 

basic metabolic profile to make sure it has been corrected his renal function 

has improved,


Urine potassium creatinine ratio currently pending





#Hyponatremia appears to be improving well needs follow-up lab





#Peripheral edema does not appear to be due to nephrotic syndrome pending preop 

protein creatinine ratio, 


patient urine protein was only 30 mg





#Overall stable to better from renal standpoint


Will need to make a follow-up appointment office upon discharge within a week or

2











Objective





- Vital Signs


Vital signs: 


                               Vital Signs - 12hr











  01/05/22 01/05/22 01/05/22





  21:10 21:11 22:47


 


Temperature   


 


Pulse Rate   


 


Pulse Rate [ 88  





Anterior   





Bilateral   





Throughout]   


 


Respiratory   24





Rate   


 


Respiratory 20  





Rate [Anterior   





Bilateral   





Throughout]   


 


Blood Pressure   


 


Blood Pressure   





[Right]   


 


O2 Sat by Pulse  98 95





Oximetry   














  01/05/22 01/06/22 01/06/22





  23:20 04:00 08:24


 


Temperature 97.7 F 98.6 F 


 


Pulse Rate 97 H 77 


 


Pulse Rate [   122 H





Anterior   





Bilateral   





Throughout]   


 


Respiratory 17 17 





Rate   


 


Respiratory   20





Rate [Anterior   





Bilateral   





Throughout]   


 


Blood Pressure 112/60  


 


Blood Pressure  108/72 





[Right]   


 


O2 Sat by Pulse 97 100 97





Oximetry   














  01/06/22 01/06/22





  08:31 08:34


 


Temperature 98.0 F 


 


Pulse Rate 121 H 


 


Pulse Rate [  





Anterior  





Bilateral  





Throughout]  


 


Respiratory 18 24





Rate  


 


Respiratory  





Rate [Anterior  





Bilateral  





Throughout]  


 


Blood Pressure 107/58 


 


Blood Pressure  





[Right]  


 


O2 Sat by Pulse 99 95





Oximetry  














- Lab





                                01/05/22 Unknown





                                 01/05/22 14:48


                             Most recent lab results











Calcium  8.5 mg/dL (8.4-10.2)   01/05/22  14:48    


 


Magnesium  1.70 mg/dL (1.7-2.3)   01/05/22  14:48    














Medications & Allergies





- Medications


Allergies/Adverse Reactions: 


                                    Allergies





No Known Allergies Allergy (Verified 12/30/21 22:39)


   








Active Medications: 














Generic Name Dose Route Start Last Admin





  Trade Name Freq  PRN Reason Stop Dose Admin


 


Acetaminophen  650 mg  12/31/21 03:51  01/02/22 22:07





  Acetaminophen 325 Mg Tab  PO   650 mg





  Q4H PRN   Administration





  Pain MILD(1-3)/Fever >100.5/HA  


 


Albuterol  2.5 mg  01/04/22 12:00 





  Albuterol 2.5 Mg/3 Ml Nebu  IH  





  Q4HRT PRN  





  Shortness Of Breath  


 


Albuterol/Ipratropium  1 ampul  01/04/22 14:00  01/06/22 08:24





  Ipratropium/Albuterol Sulfate 3 Ml Ampul.Neb  IH   1 ampul





  TIDRT SHAY   Administration


 


Apixaban  10 mg  01/05/22 10:00  01/05/22 21:39





  Apixaban 5 Mg Tab  PO  01/11/22 22:01  10 mg





  Q12HR SHAY   Administration





  Protocol  


 


Apixaban  5 mg  01/12/22 10:00 





  Apixaban 5 Mg Tab  PO  





  Q12HR SHAY  





  Protocol  


 


Famotidine  10 mg  12/31/21 10:00  01/05/22 21:40





  Famotidine 10 Mg Tab  PO   10 mg





  BID SHAY   Administration


 


Hydromorphone HCl  0.5 mg  12/31/21 03:51 





  Hydromorphone 1 Mg/1 Ml Inj  IV  





  Q3H PRN  





  Pain , Severe (7-10)  


 


Metoprolol Tartrate  12.5 mg  01/03/22 22:00  01/05/22 21:40





  Metoprolol Tartrate 25 Mg Tab  PO   12.5 mg





  BID SHAY   Administration


 


Morphine Sulfate  2 mg  12/31/21 03:51  01/03/22 18:25





  Morphine 2 Mg/1 Ml Inj  IV   2 mg





  Q4H PRN   Administration





  Pain, Moderate (4-6)  


 


Ondansetron HCl  4 mg  12/31/21 03:51 





  Ondansetron 4 Mg/2 Ml Inj  IV  





  Q8H PRN  





  Nausea And Vomiting  


 


Potassium Chloride  40 meq  01/01/22 17:00  01/05/22 10:08





  Potassium Chloride Er 20 Meq Tab  PO   40 meq





  QDAY SHAY   Administration


 


Sodium Chloride  10 ml  12/31/21 10:00  01/05/22 21:41





  Sodium Chloride 0.9% 10 Ml Flush Syringe  IV   10 ml





  BID SHAY   Administration


 


Sodium Chloride  10 ml  12/31/21 03:51 





  Sodium Chloride 0.9% 10 Ml Flush Syringe  IV  





  PRN PRN  





  LINE FLUSH  


 


Spironolactone  25 mg  12/31/21 16:00  01/05/22 10:08





  Spironolactone 25 Mg Tab  PO   25 mg





  QDAY SHAY   Administration
no